# Patient Record
Sex: MALE | Race: WHITE | NOT HISPANIC OR LATINO
[De-identification: names, ages, dates, MRNs, and addresses within clinical notes are randomized per-mention and may not be internally consistent; named-entity substitution may affect disease eponyms.]

---
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
## 2020-01-15 ENCOUNTER — APPOINTMENT (OUTPATIENT)
Dept: PEDIATRIC GASTROENTEROLOGY | Facility: HOSPITAL | Age: 20
End: 2020-01-15

## 2020-01-21 RX ORDER — EPINEPHRINE 0.3 MG/.3ML
0.5 INJECTION INTRAMUSCULAR; SUBCUTANEOUS ONCE
Refills: 0 | Status: DISCONTINUED | OUTPATIENT
Start: 2020-01-22 | End: 2020-02-06

## 2020-01-21 RX ORDER — DIPHENHYDRAMINE HCL 50 MG
50 CAPSULE ORAL ONCE
Refills: 0 | Status: DISCONTINUED | OUTPATIENT
Start: 2020-01-22 | End: 2020-02-06

## 2020-01-21 RX ORDER — SODIUM CHLORIDE 9 MG/ML
1000 INJECTION INTRAMUSCULAR; INTRAVENOUS; SUBCUTANEOUS ONCE
Refills: 0 | Status: DISCONTINUED | OUTPATIENT
Start: 2020-01-22 | End: 2020-02-06

## 2020-01-22 ENCOUNTER — OUTPATIENT (OUTPATIENT)
Dept: OUTPATIENT SERVICES | Age: 20
LOS: 1 days | End: 2020-01-22

## 2020-01-22 VITALS
HEART RATE: 82 BPM | OXYGEN SATURATION: 100 % | TEMPERATURE: 99 F | RESPIRATION RATE: 16 BRPM | DIASTOLIC BLOOD PRESSURE: 81 MMHG | SYSTOLIC BLOOD PRESSURE: 153 MMHG

## 2020-01-22 VITALS
RESPIRATION RATE: 16 BRPM | TEMPERATURE: 98 F | SYSTOLIC BLOOD PRESSURE: 148 MMHG | DIASTOLIC BLOOD PRESSURE: 78 MMHG | HEART RATE: 112 BPM | OXYGEN SATURATION: 99 %

## 2020-01-22 DIAGNOSIS — K51.50 LEFT SIDED COLITIS WITHOUT COMPLICATIONS: ICD-10-CM

## 2020-01-22 DIAGNOSIS — K62.5 HEMORRHAGE OF ANUS AND RECTUM: ICD-10-CM

## 2020-01-22 LAB
ALBUMIN SERPL ELPH-MCNC: 4.6 G/DL
ALP BLD-CCNC: 83 U/L
ALT SERPL-CCNC: 22 U/L
ANION GAP SERPL CALC-SCNC: 13 MMOL/L
AST SERPL-CCNC: 14 U/L
BASOPHILS # BLD AUTO: 0.08 K/UL
BASOPHILS NFR BLD AUTO: 1 %
BILIRUB SERPL-MCNC: 0.4 MG/DL
BUN SERPL-MCNC: 17 MG/DL
CALCIUM SERPL-MCNC: 10 MG/DL
CHLORIDE SERPL-SCNC: 105 MMOL/L
CO2 SERPL-SCNC: 27 MMOL/L
CREAT SERPL-MCNC: 0.78 MG/DL
CRP SERPL-MCNC: <0.1 MG/DL
EOSINOPHIL # BLD AUTO: 0.58 K/UL
EOSINOPHIL NFR BLD AUTO: 7 %
GLUCOSE SERPL-MCNC: 102 MG/DL
HCT VFR BLD CALC: 45.7 %
HGB BLD-MCNC: 15.3 G/DL
IMM GRANULOCYTES NFR BLD AUTO: 0.1 %
LYMPHOCYTES # BLD AUTO: 2.76 K/UL
LYMPHOCYTES NFR BLD AUTO: 33.3 %
MAN DIFF?: NORMAL
MCHC RBC-ENTMCNC: 30.1 PG
MCHC RBC-ENTMCNC: 33.5 GM/DL
MCV RBC AUTO: 90 FL
MONOCYTES # BLD AUTO: 0.7 K/UL
MONOCYTES NFR BLD AUTO: 8.5 %
NEUTROPHILS # BLD AUTO: 4.15 K/UL
NEUTROPHILS NFR BLD AUTO: 50.1 %
PLATELET # BLD AUTO: 234 K/UL
POTASSIUM SERPL-SCNC: 4.7 MMOL/L
PROT SERPL-MCNC: 7 G/DL
RBC # BLD: 5.08 M/UL
RBC # FLD: 11.8 %
SODIUM SERPL-SCNC: 145 MMOL/L
WBC # FLD AUTO: 8.28 K/UL

## 2020-01-22 RX ORDER — VEDOLIZUMAB 108 MG/.68ML
300 INJECTION, SOLUTION SUBCUTANEOUS ONCE
Refills: 0 | Status: COMPLETED | OUTPATIENT
Start: 2020-01-22 | End: 2020-01-22

## 2020-01-22 RX ADMIN — VEDOLIZUMAB 500 MILLIGRAM(S): 108 INJECTION, SOLUTION SUBCUTANEOUS at 08:48

## 2020-03-17 RX ORDER — EPINEPHRINE 0.3 MG/.3ML
0.5 INJECTION INTRAMUSCULAR; SUBCUTANEOUS ONCE
Refills: 0 | Status: DISCONTINUED | OUTPATIENT
Start: 2020-03-20 | End: 2020-04-04

## 2020-03-17 RX ORDER — DIPHENHYDRAMINE HCL 50 MG
50 CAPSULE ORAL ONCE
Refills: 0 | Status: DISCONTINUED | OUTPATIENT
Start: 2020-03-20 | End: 2020-04-04

## 2020-03-17 RX ORDER — SODIUM CHLORIDE 9 MG/ML
1000 INJECTION INTRAMUSCULAR; INTRAVENOUS; SUBCUTANEOUS ONCE
Refills: 0 | Status: DISCONTINUED | OUTPATIENT
Start: 2020-03-20 | End: 2020-04-04

## 2020-03-20 ENCOUNTER — OUTPATIENT (OUTPATIENT)
Dept: OUTPATIENT SERVICES | Age: 20
LOS: 1 days | End: 2020-03-20

## 2020-03-20 VITALS
HEART RATE: 107 BPM | SYSTOLIC BLOOD PRESSURE: 142 MMHG | OXYGEN SATURATION: 99 % | DIASTOLIC BLOOD PRESSURE: 82 MMHG | TEMPERATURE: 98 F | RESPIRATION RATE: 18 BRPM

## 2020-03-20 VITALS — HEART RATE: 113 BPM | RESPIRATION RATE: 18 BRPM | TEMPERATURE: 99 F | OXYGEN SATURATION: 99 %

## 2020-03-20 DIAGNOSIS — K62.5 HEMORRHAGE OF ANUS AND RECTUM: ICD-10-CM

## 2020-03-20 DIAGNOSIS — K51.50 LEFT SIDED COLITIS WITHOUT COMPLICATIONS: ICD-10-CM

## 2020-03-20 LAB
ALBUMIN SERPL ELPH-MCNC: 4.9 G/DL
ALP BLD-CCNC: 89 U/L
ALT SERPL-CCNC: 30 U/L
ANION GAP SERPL CALC-SCNC: 13 MMOL/L
AST SERPL-CCNC: 19 U/L
BASOPHILS # BLD AUTO: 0.08 K/UL
BASOPHILS NFR BLD AUTO: 0.8 %
BILIRUB SERPL-MCNC: 0.6 MG/DL
BUN SERPL-MCNC: 14 MG/DL
CALCIUM SERPL-MCNC: 10.5 MG/DL
CHLORIDE SERPL-SCNC: 103 MMOL/L
CO2 SERPL-SCNC: 27 MMOL/L
CREAT SERPL-MCNC: 0.77 MG/DL
CRP SERPL-MCNC: <0.1 MG/DL
EOSINOPHIL # BLD AUTO: 0.54 K/UL
EOSINOPHIL NFR BLD AUTO: 5.6 %
GLUCOSE SERPL-MCNC: 88 MG/DL
HCT VFR BLD CALC: 45.9 %
HGB BLD-MCNC: 15.7 G/DL
IMM GRANULOCYTES NFR BLD AUTO: 0.3 %
LYMPHOCYTES # BLD AUTO: 3.38 K/UL
LYMPHOCYTES NFR BLD AUTO: 35 %
MAN DIFF?: NORMAL
MCHC RBC-ENTMCNC: 30.5 PG
MCHC RBC-ENTMCNC: 34.2 GM/DL
MCV RBC AUTO: 89.3 FL
MONOCYTES # BLD AUTO: 0.84 K/UL
MONOCYTES NFR BLD AUTO: 8.7 %
NEUTROPHILS # BLD AUTO: 4.79 K/UL
NEUTROPHILS NFR BLD AUTO: 49.6 %
PLATELET # BLD AUTO: 277 K/UL
POTASSIUM SERPL-SCNC: 4.3 MMOL/L
PROT SERPL-MCNC: 7.2 G/DL
RBC # BLD: 5.14 M/UL
RBC # FLD: 12.4 %
SODIUM SERPL-SCNC: 144 MMOL/L
WBC # FLD AUTO: 9.66 K/UL

## 2020-03-20 RX ORDER — VEDOLIZUMAB 108 MG/.68ML
300 INJECTION, SOLUTION SUBCUTANEOUS ONCE
Refills: 0 | Status: COMPLETED | OUTPATIENT
Start: 2020-03-20 | End: 2020-03-20

## 2020-03-20 RX ORDER — INFLUENZA VIRUS VACCINE 15; 15; 15; 15 UG/.5ML; UG/.5ML; UG/.5ML; UG/.5ML
0.5 SUSPENSION INTRAMUSCULAR ONCE
Refills: 0 | Status: COMPLETED | OUTPATIENT
Start: 2020-03-20 | End: 2020-03-20

## 2020-03-20 RX ADMIN — VEDOLIZUMAB 500 MILLIGRAM(S): 108 INJECTION, SOLUTION SUBCUTANEOUS at 08:50

## 2020-03-20 RX ADMIN — INFLUENZA VIRUS VACCINE 0.5 MILLILITER(S): 15; 15; 15; 15 SUSPENSION INTRAMUSCULAR at 10:05

## 2020-05-12 RX ORDER — SODIUM CHLORIDE 9 MG/ML
1000 INJECTION INTRAMUSCULAR; INTRAVENOUS; SUBCUTANEOUS ONCE
Refills: 0 | Status: DISCONTINUED | OUTPATIENT
Start: 2020-05-15 | End: 2020-05-30

## 2020-05-12 RX ORDER — VEDOLIZUMAB 108 MG/.68ML
300 INJECTION, SOLUTION SUBCUTANEOUS ONCE
Refills: 0 | Status: COMPLETED | OUTPATIENT
Start: 2020-05-15 | End: 2020-05-15

## 2020-05-12 RX ORDER — EPINEPHRINE 0.3 MG/.3ML
0.5 INJECTION INTRAMUSCULAR; SUBCUTANEOUS ONCE
Refills: 0 | Status: DISCONTINUED | OUTPATIENT
Start: 2020-05-15 | End: 2020-05-30

## 2020-05-12 RX ORDER — DIPHENHYDRAMINE HCL 50 MG
50 CAPSULE ORAL ONCE
Refills: 0 | Status: DISCONTINUED | OUTPATIENT
Start: 2020-05-15 | End: 2020-05-30

## 2020-05-15 ENCOUNTER — OUTPATIENT (OUTPATIENT)
Dept: OUTPATIENT SERVICES | Age: 20
LOS: 1 days | End: 2020-05-15

## 2020-05-15 VITALS
HEART RATE: 149 BPM | OXYGEN SATURATION: 99 % | RESPIRATION RATE: 20 BRPM | TEMPERATURE: 98 F | DIASTOLIC BLOOD PRESSURE: 96 MMHG | SYSTOLIC BLOOD PRESSURE: 167 MMHG

## 2020-05-15 VITALS
HEART RATE: 119 BPM | DIASTOLIC BLOOD PRESSURE: 66 MMHG | SYSTOLIC BLOOD PRESSURE: 131 MMHG | TEMPERATURE: 98 F | RESPIRATION RATE: 18 BRPM | OXYGEN SATURATION: 98 %

## 2020-05-15 DIAGNOSIS — K51.50 LEFT SIDED COLITIS WITHOUT COMPLICATIONS: ICD-10-CM

## 2020-05-15 RX ADMIN — VEDOLIZUMAB 500 MILLIGRAM(S): 108 INJECTION, SOLUTION SUBCUTANEOUS at 15:15

## 2020-05-17 LAB
ALBUMIN SERPL ELPH-MCNC: 4.9 G/DL
ALP BLD-CCNC: 81 U/L
ALT SERPL-CCNC: 28 U/L
ANION GAP SERPL CALC-SCNC: 19 MMOL/L
AST SERPL-CCNC: 19 U/L
BASOPHILS # BLD AUTO: 0.11 K/UL
BASOPHILS NFR BLD AUTO: 1 %
BILIRUB SERPL-MCNC: 0.5 MG/DL
BUN SERPL-MCNC: 15 MG/DL
CALCIUM SERPL-MCNC: 9.8 MG/DL
CHLORIDE SERPL-SCNC: 99 MMOL/L
CO2 SERPL-SCNC: 24 MMOL/L
CREAT SERPL-MCNC: 0.68 MG/DL
CRP SERPL-MCNC: 0.15 MG/DL
EOSINOPHIL # BLD AUTO: 0.79 K/UL
EOSINOPHIL NFR BLD AUTO: 7.5 %
GLUCOSE SERPL-MCNC: 65 MG/DL
HCT VFR BLD CALC: 49.2 %
HGB BLD-MCNC: 16.7 G/DL
IMM GRANULOCYTES NFR BLD AUTO: 0.3 %
LYMPHOCYTES # BLD AUTO: 2.23 K/UL
LYMPHOCYTES NFR BLD AUTO: 21.3 %
MAN DIFF?: NORMAL
MCHC RBC-ENTMCNC: 30.6 PG
MCHC RBC-ENTMCNC: 33.9 GM/DL
MCV RBC AUTO: 90.3 FL
MONOCYTES # BLD AUTO: 0.69 K/UL
MONOCYTES NFR BLD AUTO: 6.6 %
NEUTROPHILS # BLD AUTO: 6.63 K/UL
NEUTROPHILS NFR BLD AUTO: 63.3 %
PLATELET # BLD AUTO: 282 K/UL
POTASSIUM SERPL-SCNC: 4.1 MMOL/L
PROT SERPL-MCNC: 7.8 G/DL
RBC # BLD: 5.45 M/UL
RBC # FLD: 12.4 %
SARS-COV-2 IGG SERPL IA-ACNC: <0.1 INDEX
SARS-COV-2 IGG SERPL QL IA: NEGATIVE
SODIUM SERPL-SCNC: 143 MMOL/L
WBC # FLD AUTO: 10.48 K/UL

## 2020-05-28 ENCOUNTER — APPOINTMENT (OUTPATIENT)
Dept: PEDIATRIC GASTROENTEROLOGY | Facility: CLINIC | Age: 20
End: 2020-05-28
Payer: COMMERCIAL

## 2020-05-28 DIAGNOSIS — L65.9 NONSCARRING HAIR LOSS, UNSPECIFIED: ICD-10-CM

## 2020-05-28 PROCEDURE — 99214 OFFICE O/P EST MOD 30 MIN: CPT | Mod: 95

## 2020-05-28 NOTE — HISTORY OF PRESENT ILLNESS
[Home] : at home, [unfilled] , at the time of the visit. [Other Location: e.g. Home (Enter Location, City,State)___] : at [unfilled] [Verbal consent obtained from patient] : the patient, [unfilled] [de-identified] : Overview: Juan presented in April 2017 with bloody diarrhea. He was positive for C difficile infection on initial stool tests, treated with vancomycin without complete resolution of symptoms. He then had an endoscopy and colonoscopy with moderate left sided ulcerative colitis diagnosed. He was started on prednisone 40 mg prednisone daily and Lialda 4.8 grams daily. He had clinical remission and successfully tapered his prednisone. However mild symptoms eventually returned, and he had repeat colonoscopy July 2018 with active confluent colitis to 55 cm from anal verge and normal right colon / ileum. He was started on Vedolizumab 300 mg September 2018, which led to clinical remission by week 24. \par \par Interval history: Juan has overall been doing well.  He is on Vedolizumab 300 mg every 8 weeks, and for the first 7+ weeks he has no symptoms, with formed stools and no blood.  In the 1-2 days prior to infusion, he begins to have some vague mild abdominal discomfort, increased bowel movement frequency by additional 1-2 more bowel movements per day from baseline, and softer stool consistency, but not diarrhea, and no rectal bleeding.  After the infusion, these symptoms resolve.  Juan notes he has always been anxious about medical encounters and thinks these symptoms are more irritable in nature related to the anxiety of the upcoming infusion.  Juan's other complaint today is hair thinning.  His triplet brothers have not experienced this, and he wonders if it could be medication related\par \par Previous evaluations\par 4/2017\par Fecal calprotectin 321\par C difficile PCR: positive\par Quantiferon Gold: negative\par Hepatitis BsAg: negative, Hep BsAb: nonreactive

## 2020-05-28 NOTE — CONSULT LETTER
[Dear  ___] : Dear  [unfilled], [Courtesy Letter:] : I had the pleasure of seeing your patient, [unfilled], in my office today. [Please see my note below.] : Please see my note below. [Consult Closing:] : Thank you very much for allowing me to participate in the care of this patient.  If you have any questions, please do not hesitate to contact me. [Sincerely,] : Sincerely, [FreeTextEntry3] : Nomi Martin MD MS\par [facility]\par

## 2020-05-28 NOTE — ASSESSMENT
[Educated Patient & Family about Diagnosis] : educated the patient and family about the diagnosis [FreeTextEntry1] : Juan is a 19 year old male with ulcerative colitis in clinical remission on vedolizumab maintenance every 8 weeks.  His abdominal discomfort and bowel movement change in the 1-2 days before infusion could be breakthrough IBD symptoms or IBS related to anxiety of upcoming infusion.  Juan agrees to collect a calprotectin during the 1-2 days before an infusion.  If elevated will shorten interval to 7 weeks.  If normal, will discuss IBS like treatments.  Regarding his hair thinning concern, this has not been reported in any significant proportion of people receiving vedolizumab.  Recommended dermatology evaluation for any other possible scalp pathologies that could be contributing vs regular male pattern hair changes.

## 2020-05-28 NOTE — PHYSICAL EXAM
[Well Nourished] : well nourished [Alert and Active] : alert and active [Interactive] : interactive [Respiratory Distress] : no respiratory distress  [Jaundice] : no jaundice

## 2020-07-01 RX ORDER — DIPHENHYDRAMINE HCL 50 MG
50 CAPSULE ORAL ONCE
Refills: 0 | Status: DISCONTINUED | OUTPATIENT
Start: 2020-07-10 | End: 2020-07-25

## 2020-07-01 RX ORDER — EPINEPHRINE 0.3 MG/.3ML
0.5 INJECTION INTRAMUSCULAR; SUBCUTANEOUS ONCE
Refills: 0 | Status: DISCONTINUED | OUTPATIENT
Start: 2020-07-10 | End: 2020-07-25

## 2020-07-01 RX ORDER — SODIUM CHLORIDE 9 MG/ML
1000 INJECTION INTRAMUSCULAR; INTRAVENOUS; SUBCUTANEOUS ONCE
Refills: 0 | Status: DISCONTINUED | OUTPATIENT
Start: 2020-07-10 | End: 2020-07-25

## 2020-07-10 ENCOUNTER — OUTPATIENT (OUTPATIENT)
Dept: OUTPATIENT SERVICES | Age: 20
LOS: 1 days | End: 2020-07-10

## 2020-07-10 VITALS
HEART RATE: 125 BPM | DIASTOLIC BLOOD PRESSURE: 79 MMHG | SYSTOLIC BLOOD PRESSURE: 157 MMHG | OXYGEN SATURATION: 100 % | RESPIRATION RATE: 18 BRPM | TEMPERATURE: 98 F

## 2020-07-10 VITALS
DIASTOLIC BLOOD PRESSURE: 89 MMHG | RESPIRATION RATE: 18 BRPM | TEMPERATURE: 99 F | OXYGEN SATURATION: 98 % | SYSTOLIC BLOOD PRESSURE: 150 MMHG | HEART RATE: 111 BPM

## 2020-07-10 DIAGNOSIS — K51.50 LEFT SIDED COLITIS WITHOUT COMPLICATIONS: ICD-10-CM

## 2020-07-10 DIAGNOSIS — K62.5 HEMORRHAGE OF ANUS AND RECTUM: ICD-10-CM

## 2020-07-10 LAB
ALBUMIN SERPL ELPH-MCNC: 4.8 G/DL
ALP BLD-CCNC: 91 U/L
ALT SERPL-CCNC: 35 U/L
ANION GAP SERPL CALC-SCNC: 17 MMOL/L
AST SERPL-CCNC: 20 U/L
BASOPHILS # BLD AUTO: 0.12 K/UL
BASOPHILS NFR BLD AUTO: 1 %
BILIRUB SERPL-MCNC: 0.6 MG/DL
BUN SERPL-MCNC: 12 MG/DL
CALCIUM SERPL-MCNC: 10.2 MG/DL
CHLORIDE SERPL-SCNC: 101 MMOL/L
CO2 SERPL-SCNC: 24 MMOL/L
CREAT SERPL-MCNC: 0.85 MG/DL
CRP SERPL-MCNC: 0.14 MG/DL
EOSINOPHIL # BLD AUTO: 1.19 K/UL
EOSINOPHIL NFR BLD AUTO: 10.2 %
GLUCOSE SERPL-MCNC: 99 MG/DL
HCT VFR BLD CALC: 48.6 %
HGB BLD-MCNC: 16.8 G/DL
IMM GRANULOCYTES NFR BLD AUTO: 0.3 %
LYMPHOCYTES # BLD AUTO: 3.07 K/UL
LYMPHOCYTES NFR BLD AUTO: 26.3 %
MAN DIFF?: NORMAL
MCHC RBC-ENTMCNC: 30.9 PG
MCHC RBC-ENTMCNC: 34.6 GM/DL
MCV RBC AUTO: 89.3 FL
MONOCYTES # BLD AUTO: 0.9 K/UL
MONOCYTES NFR BLD AUTO: 7.7 %
NEUTROPHILS # BLD AUTO: 6.36 K/UL
NEUTROPHILS NFR BLD AUTO: 54.5 %
PLATELET # BLD AUTO: 296 K/UL
POTASSIUM SERPL-SCNC: 4.1 MMOL/L
PROT SERPL-MCNC: 7.1 G/DL
RBC # BLD: 5.44 M/UL
RBC # FLD: 12.3 %
SODIUM SERPL-SCNC: 143 MMOL/L
WBC # FLD AUTO: 11.67 K/UL

## 2020-07-10 RX ORDER — VEDOLIZUMAB 108 MG/.68ML
300 INJECTION, SOLUTION SUBCUTANEOUS ONCE
Refills: 0 | Status: COMPLETED | OUTPATIENT
Start: 2020-07-10 | End: 2020-07-10

## 2020-07-10 RX ADMIN — VEDOLIZUMAB 500 MILLIGRAM(S): 108 INJECTION, SOLUTION SUBCUTANEOUS at 08:57

## 2020-07-16 LAB — CALPROTECTIN FECAL: 900 UG/G

## 2020-07-20 NOTE — ED PROVIDER NOTE - CPE EDP RESP NORM
Please contact patient for In-patient follow up.  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 Diabetic Infec  Number of visits in past 12 months:0/1     normal...

## 2020-08-10 ENCOUNTER — APPOINTMENT (OUTPATIENT)
Dept: PEDIATRIC GASTROENTEROLOGY | Facility: CLINIC | Age: 20
End: 2020-08-10
Payer: COMMERCIAL

## 2020-08-10 VITALS
BODY MASS INDEX: 26.76 KG/M2 | HEIGHT: 70.87 IN | DIASTOLIC BLOOD PRESSURE: 94 MMHG | WEIGHT: 191.14 LBS | TEMPERATURE: 99.1 F | HEART RATE: 113 BPM | SYSTOLIC BLOOD PRESSURE: 203 MMHG

## 2020-08-10 PROCEDURE — 99214 OFFICE O/P EST MOD 30 MIN: CPT

## 2020-08-12 NOTE — CONSULT LETTER
[Dear  ___] : Dear  [unfilled], [Courtesy Letter:] : I had the pleasure of seeing your patient, [unfilled], in my office today. [Please see my note below.] : Please see my note below. [Consult Closing:] : Thank you very much for allowing me to participate in the care of this patient.  If you have any questions, please do not hesitate to contact me. [Sincerely,] : Sincerely, [FreeTextEntry3] : Nomi Martin MD MS\par [facility]\par

## 2020-08-12 NOTE — HISTORY OF PRESENT ILLNESS
[de-identified] : Overview: Juan presented in April 2017 with bloody diarrhea. He was positive for C difficile infection on initial stool tests, treated with vancomycin without complete resolution of symptoms. He then had an endoscopy and colonoscopy with moderate left sided ulcerative colitis diagnosed. He was started on prednisone 40 mg prednisone daily and Lialda 4.8 grams daily. He had clinical remission and successfully tapered his prednisone. However mild symptoms eventually returned, and he had repeat colonoscopy July 2018 with active confluent colitis to 55 cm from anal verge and normal right colon / ileum. He was started on Vedolizumab 300 mg September 2018, which led to clinical remission by week 24. \par \par Interval history: Juan has overall been doing well since last visit 2-3 months ago.  He is on Vedolizumab 300 mg every 8 weeks.  He did a fecal calprotectin recently, which was 900.  He does not have active gastrointestinal symptoms at this time.  He says when he is anxious or stressed he may notice more abdominal symptoms, otherwise feels well.  \par \par Previous evaluations\par 4/2017\par Fecal calprotectin 321\par C difficile PCR: positive\par Quantiferon Gold: negative\par Hepatitis BsAg: negative, Hep BsAb: nonreactive.

## 2020-08-12 NOTE — ASSESSMENT
[Educated Patient & Family about Diagnosis] : educated the patient and family about the diagnosis [FreeTextEntry1] : Juan is a 19 year old male with UC on Entyvio in clinical remission, however a recently obtain fecal calprotectin was very high 900, suggesting his mucosa remains active.  We discussed importance of mucosal healing in the treat to target paradigm.  Will repeat fecal calprotectin, and if remains unchanged from previous recent test, will discuss treatment modifications, possibly combination therapy.

## 2020-08-28 RX ORDER — SODIUM CHLORIDE 9 MG/ML
1000 INJECTION INTRAMUSCULAR; INTRAVENOUS; SUBCUTANEOUS ONCE
Refills: 0 | Status: DISCONTINUED | OUTPATIENT
Start: 2020-09-04 | End: 2020-09-18

## 2020-08-28 RX ORDER — EPINEPHRINE 0.3 MG/.3ML
0.5 INJECTION INTRAMUSCULAR; SUBCUTANEOUS ONCE
Refills: 0 | Status: DISCONTINUED | OUTPATIENT
Start: 2020-09-04 | End: 2020-09-18

## 2020-08-28 RX ORDER — DIPHENHYDRAMINE HCL 50 MG
50 CAPSULE ORAL ONCE
Refills: 0 | Status: DISCONTINUED | OUTPATIENT
Start: 2020-09-04 | End: 2020-09-18

## 2020-09-04 ENCOUNTER — OUTPATIENT (OUTPATIENT)
Dept: OUTPATIENT SERVICES | Age: 20
LOS: 1 days | End: 2020-09-04

## 2020-09-04 VITALS
RESPIRATION RATE: 18 BRPM | DIASTOLIC BLOOD PRESSURE: 76 MMHG | TEMPERATURE: 98 F | OXYGEN SATURATION: 96 % | HEART RATE: 115 BPM | SYSTOLIC BLOOD PRESSURE: 145 MMHG

## 2020-09-04 VITALS
SYSTOLIC BLOOD PRESSURE: 160 MMHG | RESPIRATION RATE: 18 BRPM | TEMPERATURE: 98 F | HEART RATE: 119 BPM | DIASTOLIC BLOOD PRESSURE: 89 MMHG | OXYGEN SATURATION: 100 % | WEIGHT: 192.68 LBS

## 2020-09-04 DIAGNOSIS — K62.5 HEMORRHAGE OF ANUS AND RECTUM: ICD-10-CM

## 2020-09-04 LAB
ALBUMIN SERPL ELPH-MCNC: 5.1 G/DL
ALP BLD-CCNC: 108 U/L
ALT SERPL-CCNC: 32 U/L
ANION GAP SERPL CALC-SCNC: 15 MMOL/L
AST SERPL-CCNC: 19 U/L
BASOPHILS # BLD AUTO: 0.12 K/UL
BASOPHILS NFR BLD AUTO: 0.9 %
BILIRUB SERPL-MCNC: 0.6 MG/DL
BUN SERPL-MCNC: 12 MG/DL
CALCIUM SERPL-MCNC: 10.1 MG/DL
CHLORIDE SERPL-SCNC: 102 MMOL/L
CO2 SERPL-SCNC: 26 MMOL/L
CREAT SERPL-MCNC: 0.85 MG/DL
CRP SERPL-MCNC: <0.1 MG/DL
EOSINOPHIL # BLD AUTO: 1.23 K/UL
EOSINOPHIL NFR BLD AUTO: 9.6 %
GLUCOSE SERPL-MCNC: 80 MG/DL
HCT VFR BLD CALC: 49.9 %
HGB BLD-MCNC: 16.8 G/DL
IMM GRANULOCYTES NFR BLD AUTO: 0.2 %
LYMPHOCYTES # BLD AUTO: 2.65 K/UL
LYMPHOCYTES NFR BLD AUTO: 20.8 %
MAN DIFF?: NORMAL
MCHC RBC-ENTMCNC: 30.3 PG
MCHC RBC-ENTMCNC: 33.7 GM/DL
MCV RBC AUTO: 90.1 FL
MONOCYTES # BLD AUTO: 0.98 K/UL
MONOCYTES NFR BLD AUTO: 7.7 %
NEUTROPHILS # BLD AUTO: 7.74 K/UL
NEUTROPHILS NFR BLD AUTO: 60.8 %
PLATELET # BLD AUTO: 331 K/UL
POTASSIUM SERPL-SCNC: 4 MMOL/L
PROT SERPL-MCNC: 7.6 G/DL
RBC # BLD: 5.54 M/UL
RBC # FLD: 11.9 %
SODIUM SERPL-SCNC: 143 MMOL/L
WBC # FLD AUTO: 12.75 K/UL

## 2020-09-04 RX ORDER — VEDOLIZUMAB 108 MG/.68ML
300 INJECTION, SOLUTION SUBCUTANEOUS ONCE
Refills: 0 | Status: COMPLETED | OUTPATIENT
Start: 2020-09-04 | End: 2020-09-04

## 2020-09-04 RX ADMIN — VEDOLIZUMAB 500 MILLIGRAM(S): 108 INJECTION, SOLUTION SUBCUTANEOUS at 09:04

## 2020-10-30 ENCOUNTER — OUTPATIENT (OUTPATIENT)
Dept: OUTPATIENT SERVICES | Age: 20
LOS: 1 days | End: 2020-10-30

## 2020-10-30 VITALS
OXYGEN SATURATION: 98 % | DIASTOLIC BLOOD PRESSURE: 85 MMHG | HEART RATE: 134 BPM | RESPIRATION RATE: 18 BRPM | SYSTOLIC BLOOD PRESSURE: 121 MMHG

## 2020-10-30 VITALS
TEMPERATURE: 98 F | SYSTOLIC BLOOD PRESSURE: 109 MMHG | RESPIRATION RATE: 18 BRPM | OXYGEN SATURATION: 98 % | DIASTOLIC BLOOD PRESSURE: 71 MMHG | HEART RATE: 124 BPM

## 2020-10-30 DIAGNOSIS — K51.50 LEFT SIDED COLITIS WITHOUT COMPLICATIONS: ICD-10-CM

## 2020-10-30 RX ORDER — VEDOLIZUMAB 108 MG/.68ML
300 INJECTION, SOLUTION SUBCUTANEOUS ONCE
Refills: 0 | Status: COMPLETED | OUTPATIENT
Start: 2020-10-30 | End: 2020-10-30

## 2020-10-30 RX ORDER — INFLUENZA VIRUS VACCINE 15; 15; 15; 15 UG/.5ML; UG/.5ML; UG/.5ML; UG/.5ML
0.5 SUSPENSION INTRAMUSCULAR ONCE
Refills: 0 | Status: COMPLETED | OUTPATIENT
Start: 2020-10-30 | End: 2020-10-30

## 2020-10-30 RX ADMIN — VEDOLIZUMAB 500 MILLIGRAM(S): 108 INJECTION, SOLUTION SUBCUTANEOUS at 08:50

## 2020-10-30 RX ADMIN — INFLUENZA VIRUS VACCINE 0.5 MILLILITER(S): 15; 15; 15; 15 SUSPENSION INTRAMUSCULAR at 09:25

## 2020-11-02 LAB
ALBUMIN SERPL ELPH-MCNC: 4.9 G/DL
ALP BLD-CCNC: 121 U/L
ALT SERPL-CCNC: 47 U/L
ANION GAP SERPL CALC-SCNC: 19 MMOL/L
AST SERPL-CCNC: 27 U/L
BASOPHILS # BLD AUTO: 0.09 K/UL
BASOPHILS NFR BLD AUTO: 1 %
BILIRUB SERPL-MCNC: 1 MG/DL
BUN SERPL-MCNC: 9 MG/DL
CALCIUM SERPL-MCNC: 10.1 MG/DL
CALPROTECTIN FECAL: 1736 UG/G
CHLORIDE SERPL-SCNC: 101 MMOL/L
CO2 SERPL-SCNC: 24 MMOL/L
CREAT SERPL-MCNC: 0.79 MG/DL
CRP SERPL-MCNC: 1.23 MG/DL
EOSINOPHIL # BLD AUTO: 0.22 K/UL
EOSINOPHIL NFR BLD AUTO: 2.5 %
GLUCOSE SERPL-MCNC: 138 MG/DL
HCT VFR BLD CALC: 49.7 %
HGB BLD-MCNC: 16.8 G/DL
IMM GRANULOCYTES NFR BLD AUTO: 0.1 %
LYMPHOCYTES # BLD AUTO: 0.95 K/UL
LYMPHOCYTES NFR BLD AUTO: 10.6 %
MAN DIFF?: NORMAL
MCHC RBC-ENTMCNC: 30.9 PG
MCHC RBC-ENTMCNC: 33.8 GM/DL
MCV RBC AUTO: 91.4 FL
MONOCYTES # BLD AUTO: 0.66 K/UL
MONOCYTES NFR BLD AUTO: 7.4 %
NEUTROPHILS # BLD AUTO: 7.04 K/UL
NEUTROPHILS NFR BLD AUTO: 78.4 %
PLATELET # BLD AUTO: 291 K/UL
POTASSIUM SERPL-SCNC: 3.9 MMOL/L
PROT SERPL-MCNC: 7.8 G/DL
RBC # BLD: 5.44 M/UL
RBC # FLD: 12.3 %
SODIUM SERPL-SCNC: 144 MMOL/L
WBC # FLD AUTO: 8.97 K/UL

## 2020-11-03 DIAGNOSIS — Z23 ENCOUNTER FOR IMMUNIZATION: ICD-10-CM

## 2020-11-05 ENCOUNTER — TRANSCRIPTION ENCOUNTER (OUTPATIENT)
Age: 20
End: 2020-11-05

## 2020-11-05 ENCOUNTER — APPOINTMENT (OUTPATIENT)
Dept: PEDIATRIC GASTROENTEROLOGY | Facility: CLINIC | Age: 20
End: 2020-11-05
Payer: COMMERCIAL

## 2020-11-05 VITALS — HEART RATE: 142 BPM | DIASTOLIC BLOOD PRESSURE: 77 MMHG | SYSTOLIC BLOOD PRESSURE: 161 MMHG

## 2020-11-05 VITALS
SYSTOLIC BLOOD PRESSURE: 164 MMHG | TEMPERATURE: 98.5 F | DIASTOLIC BLOOD PRESSURE: 87 MMHG | HEART RATE: 150 BPM | HEIGHT: 71.18 IN | WEIGHT: 195.77 LBS | BODY MASS INDEX: 27.11 KG/M2

## 2020-11-05 DIAGNOSIS — Z87.19 PERSONAL HISTORY OF OTHER DISEASES OF THE DIGESTIVE SYSTEM: ICD-10-CM

## 2020-11-05 PROCEDURE — 99214 OFFICE O/P EST MOD 30 MIN: CPT

## 2020-11-05 PROCEDURE — 99072 ADDL SUPL MATRL&STAF TM PHE: CPT

## 2020-11-06 ENCOUNTER — NON-APPOINTMENT (OUTPATIENT)
Age: 20
End: 2020-11-06

## 2020-11-06 PROBLEM — Z87.19 HISTORY OF RECTAL BLEEDING: Status: RESOLVED | Noted: 2018-02-15 | Resolved: 2020-11-06

## 2020-11-06 NOTE — HISTORY OF PRESENT ILLNESS
[de-identified] : Overview: Juan presented in April 2017 with bloody diarrhea. He was positive for C difficile infection on initial stool tests, treated with vancomycin without complete resolution of symptoms. He then had an endoscopy and colonoscopy with moderate left sided ulcerative colitis diagnosed. He was started on prednisone 40 mg prednisone daily and Lialda 4.8 grams daily. He had clinical remission and successfully tapered his prednisone. However mild symptoms eventually returned, and he had repeat colonoscopy July 2018 with active confluent colitis to 55 cm from anal verge and normal right colon / ileum. He was started on Vedolizumab 300 mg September 2018, which led to clinical remission by week 24. \par \par Interval history: Juan has been having new colitis symptoms in the past 1-2 weeks.  He had a fecal calprotectin in July of 900 without symptoms.  A follow up calprotectin collected last week was 1736.  He now is having no abdominal pain, semi formed stools with small amount of blood in most stools and no nocturnal bowel movements.  PUCAI 30.  He continues on Vedolizumab 300 mg every 8 weeks. \par \par Previous evaluations\par 4/2017\par Fecal calprotectin 321\par C difficile PCR: positive\par Quantiferon Gold: negative\par Hepatitis BsAg: negative, Hep BsAb: nonreactive. \par

## 2020-11-06 NOTE — ASSESSMENT
[Educated Patient & Family about Diagnosis] : educated the patient and family about the diagnosis [FreeTextEntry1] : Juan is a 20 year old male with UC who achieved clinical remission on Entyvio however now is having breakthrough symptoms.  Given history of C diff infection at time of diagnosis, will repeat a C diff PCR to ensure no concurrent infection.  If C diff is negative, adjustment in his therapy is indicated.  Discussed numerous options including changing the Entyvio to every 4 weeks, or change in therapy to anti-TNF, ustekinumab, or tofacitinib.  We also discussed a clinical trial option using transcutaneous vagal nerve stimulation, which is an option Juan is interested in.  He will review the protocol consent form and then determine if he would like to participate.  If he chooses a change in medical therapy, a flex sig would be performed before choosing which therapy is most appropriate.

## 2020-11-06 NOTE — CONSULT LETTER
[Dear  ___] : Dear  [unfilled], [Courtesy Letter:] : I had the pleasure of seeing your patient, [unfilled], in my office today. [Please see my note below.] : Please see my note below. [Consult Closing:] : Thank you very much for allowing me to participate in the care of this patient.  If you have any questions, please do not hesitate to contact me. [Sincerely,] : Sincerely, [FreeTextEntry3] : Nomi Martin MD MS\par [facility]\par

## 2020-11-08 LAB
C DIFF TOX GENS STL QL NAA+PROBE: NORMAL
CDIFF BY PCR: NOT DETECTED

## 2020-12-08 ENCOUNTER — NON-APPOINTMENT (OUTPATIENT)
Age: 20
End: 2020-12-08

## 2020-12-28 ENCOUNTER — OUTPATIENT (OUTPATIENT)
Dept: OUTPATIENT SERVICES | Age: 20
LOS: 1 days | End: 2020-12-28

## 2020-12-28 VITALS
HEART RATE: 117 BPM | SYSTOLIC BLOOD PRESSURE: 129 MMHG | OXYGEN SATURATION: 98 % | DIASTOLIC BLOOD PRESSURE: 89 MMHG | TEMPERATURE: 99 F | RESPIRATION RATE: 18 BRPM

## 2020-12-28 VITALS
RESPIRATION RATE: 20 BRPM | DIASTOLIC BLOOD PRESSURE: 78 MMHG | SYSTOLIC BLOOD PRESSURE: 189 MMHG | OXYGEN SATURATION: 98 % | HEART RATE: 144 BPM | TEMPERATURE: 99 F

## 2020-12-28 DIAGNOSIS — K51.50 LEFT SIDED COLITIS WITHOUT COMPLICATIONS: ICD-10-CM

## 2020-12-28 RX ORDER — VEDOLIZUMAB 108 MG/.68ML
300 INJECTION, SOLUTION SUBCUTANEOUS ONCE
Refills: 0 | Status: COMPLETED | OUTPATIENT
Start: 2020-12-28 | End: 2020-12-28

## 2020-12-28 RX ADMIN — VEDOLIZUMAB 500 MILLIGRAM(S): 108 INJECTION, SOLUTION SUBCUTANEOUS at 08:49

## 2020-12-29 LAB
ALBUMIN SERPL ELPH-MCNC: 4.7 G/DL
ALP BLD-CCNC: 121 U/L
ALT SERPL-CCNC: 26 U/L
ANION GAP SERPL CALC-SCNC: 13 MMOL/L
AST SERPL-CCNC: 18 U/L
BASOPHILS # BLD AUTO: 0.14 K/UL
BASOPHILS NFR BLD AUTO: 1.2 %
BILIRUB SERPL-MCNC: 0.4 MG/DL
BUN SERPL-MCNC: 12 MG/DL
CALCIUM SERPL-MCNC: 9.9 MG/DL
CHLORIDE SERPL-SCNC: 106 MMOL/L
CO2 SERPL-SCNC: 24 MMOL/L
CREAT SERPL-MCNC: 0.89 MG/DL
CRP SERPL-MCNC: 0.36 MG/DL
EOSINOPHIL # BLD AUTO: 1.15 K/UL
EOSINOPHIL NFR BLD AUTO: 10.1 %
GLUCOSE SERPL-MCNC: 92 MG/DL
HCT VFR BLD CALC: 48.2 %
HGB BLD-MCNC: 16.4 G/DL
IMM GRANULOCYTES NFR BLD AUTO: 0.4 %
LYMPHOCYTES # BLD AUTO: 3.04 K/UL
LYMPHOCYTES NFR BLD AUTO: 26.6 %
MAN DIFF?: NORMAL
MCHC RBC-ENTMCNC: 30.5 PG
MCHC RBC-ENTMCNC: 34 GM/DL
MCV RBC AUTO: 89.8 FL
MONOCYTES # BLD AUTO: 1.48 K/UL
MONOCYTES NFR BLD AUTO: 12.9 %
NEUTROPHILS # BLD AUTO: 5.57 K/UL
NEUTROPHILS NFR BLD AUTO: 48.8 %
PLATELET # BLD AUTO: 335 K/UL
POTASSIUM SERPL-SCNC: 4 MMOL/L
PROT SERPL-MCNC: 7.6 G/DL
RBC # BLD: 5.37 M/UL
RBC # FLD: 12.1 %
SODIUM SERPL-SCNC: 143 MMOL/L
WBC # FLD AUTO: 11.43 K/UL

## 2021-01-02 ENCOUNTER — NON-APPOINTMENT (OUTPATIENT)
Age: 21
End: 2021-01-02

## 2021-01-08 LAB
C DIFF TOX GENS STL QL NAA+PROBE: NORMAL
CDIFF BY PCR: NOT DETECTED

## 2021-01-25 ENCOUNTER — RX RENEWAL (OUTPATIENT)
Age: 21
End: 2021-01-25

## 2021-01-27 ENCOUNTER — OUTPATIENT (OUTPATIENT)
Dept: OUTPATIENT SERVICES | Age: 21
LOS: 1 days | End: 2021-01-27

## 2021-01-27 VITALS
HEART RATE: 120 BPM | DIASTOLIC BLOOD PRESSURE: 83 MMHG | SYSTOLIC BLOOD PRESSURE: 144 MMHG | OXYGEN SATURATION: 100 % | RESPIRATION RATE: 20 BRPM | TEMPERATURE: 99 F

## 2021-01-27 VITALS
HEART RATE: 165 BPM | OXYGEN SATURATION: 100 % | TEMPERATURE: 98 F | RESPIRATION RATE: 20 BRPM | DIASTOLIC BLOOD PRESSURE: 89 MMHG | SYSTOLIC BLOOD PRESSURE: 147 MMHG

## 2021-01-27 LAB
ALBUMIN SERPL ELPH-MCNC: 4.5 G/DL
ALP BLD-CCNC: 109 U/L
ALT SERPL-CCNC: 23 U/L
ANION GAP SERPL CALC-SCNC: 14 MMOL/L
AST SERPL-CCNC: 17 U/L
BASOPHILS # BLD AUTO: 0.09 K/UL
BASOPHILS NFR BLD AUTO: 0.9 %
BILIRUB SERPL-MCNC: 0.6 MG/DL
BUN SERPL-MCNC: 11 MG/DL
CALCIUM SERPL-MCNC: 10.5 MG/DL
CHLORIDE SERPL-SCNC: 105 MMOL/L
CO2 SERPL-SCNC: 23 MMOL/L
CREAT SERPL-MCNC: 0.92 MG/DL
CRP SERPL-MCNC: 0.71 MG/DL
EOSINOPHIL # BLD AUTO: 0.46 K/UL
EOSINOPHIL NFR BLD AUTO: 4.7 %
GLUCOSE SERPL-MCNC: 129 MG/DL
HCT VFR BLD CALC: 45.2 %
HGB BLD-MCNC: 14.7 G/DL
IMM GRANULOCYTES NFR BLD AUTO: 0.3 %
LYMPHOCYTES # BLD AUTO: 2.14 K/UL
LYMPHOCYTES NFR BLD AUTO: 21.8 %
MAN DIFF?: NORMAL
MCHC RBC-ENTMCNC: 29.8 PG
MCHC RBC-ENTMCNC: 32.5 GM/DL
MCV RBC AUTO: 91.5 FL
MONOCYTES # BLD AUTO: 1.13 K/UL
MONOCYTES NFR BLD AUTO: 11.5 %
NEUTROPHILS # BLD AUTO: 5.95 K/UL
NEUTROPHILS NFR BLD AUTO: 60.8 %
PLATELET # BLD AUTO: 370 K/UL
POTASSIUM SERPL-SCNC: 4.1 MMOL/L
PROT SERPL-MCNC: 7.5 G/DL
RBC # BLD: 4.94 M/UL
RBC # FLD: 12.2 %
SODIUM SERPL-SCNC: 141 MMOL/L
WBC # FLD AUTO: 9.8 K/UL

## 2021-01-27 RX ORDER — VEDOLIZUMAB 108 MG/.68ML
300 INJECTION, SOLUTION SUBCUTANEOUS ONCE
Refills: 0 | Status: COMPLETED | OUTPATIENT
Start: 2021-01-27 | End: 2021-01-27

## 2021-01-27 RX ADMIN — VEDOLIZUMAB 500 MILLIGRAM(S): 108 INJECTION, SOLUTION SUBCUTANEOUS at 09:35

## 2021-01-28 DIAGNOSIS — K51.50 LEFT SIDED COLITIS WITHOUT COMPLICATIONS: ICD-10-CM

## 2021-02-05 ENCOUNTER — NON-APPOINTMENT (OUTPATIENT)
Age: 21
End: 2021-02-05

## 2021-02-22 RX ORDER — PREDNISONE 20 MG/1
20 TABLET ORAL DAILY
Qty: 60 | Refills: 0 | Status: DISCONTINUED | COMMUNITY
Start: 2021-01-02 | End: 2021-02-22

## 2021-02-26 ENCOUNTER — OUTPATIENT (OUTPATIENT)
Dept: OUTPATIENT SERVICES | Age: 21
LOS: 1 days | End: 2021-02-26

## 2021-02-26 VITALS
TEMPERATURE: 98 F | WEIGHT: 190.7 LBS | OXYGEN SATURATION: 100 % | HEART RATE: 120 BPM | SYSTOLIC BLOOD PRESSURE: 167 MMHG | RESPIRATION RATE: 20 BRPM | DIASTOLIC BLOOD PRESSURE: 80 MMHG

## 2021-02-26 VITALS
RESPIRATION RATE: 18 BRPM | TEMPERATURE: 99 F | SYSTOLIC BLOOD PRESSURE: 152 MMHG | HEART RATE: 122 BPM | OXYGEN SATURATION: 99 % | DIASTOLIC BLOOD PRESSURE: 93 MMHG

## 2021-02-26 DIAGNOSIS — K51.50 LEFT SIDED COLITIS WITHOUT COMPLICATIONS: ICD-10-CM

## 2021-02-26 LAB
ALBUMIN SERPL ELPH-MCNC: 4.5 G/DL
ALP BLD-CCNC: 97 U/L
ALT SERPL-CCNC: 17 U/L
ANION GAP SERPL CALC-SCNC: 16 MMOL/L
AST SERPL-CCNC: 14 U/L
BASOPHILS # BLD AUTO: 0.09 K/UL
BASOPHILS NFR BLD AUTO: 0.8 %
BILIRUB SERPL-MCNC: 0.4 MG/DL
BUN SERPL-MCNC: 13 MG/DL
CALCIUM SERPL-MCNC: 10.2 MG/DL
CHLORIDE SERPL-SCNC: 104 MMOL/L
CO2 SERPL-SCNC: 22 MMOL/L
CREAT SERPL-MCNC: 0.86 MG/DL
CRP SERPL-MCNC: 0.25 MG/DL
EOSINOPHIL # BLD AUTO: 0.43 K/UL
EOSINOPHIL NFR BLD AUTO: 3.9 %
GGT SERPL-CCNC: 14 U/L
GLUCOSE SERPL-MCNC: 98 MG/DL
HCT VFR BLD CALC: 41.6 %
HGB BLD-MCNC: 13.9 G/DL
IMM GRANULOCYTES NFR BLD AUTO: 0.3 %
LYMPHOCYTES # BLD AUTO: 3.69 K/UL
LYMPHOCYTES NFR BLD AUTO: 33.3 %
MAN DIFF?: NORMAL
MCHC RBC-ENTMCNC: 29.3 PG
MCHC RBC-ENTMCNC: 33.4 GM/DL
MCV RBC AUTO: 87.8 FL
MONOCYTES # BLD AUTO: 0.97 K/UL
MONOCYTES NFR BLD AUTO: 8.8 %
NEUTROPHILS # BLD AUTO: 5.87 K/UL
NEUTROPHILS NFR BLD AUTO: 52.9 %
PLATELET # BLD AUTO: 430 K/UL
POTASSIUM SERPL-SCNC: 4.3 MMOL/L
PROT SERPL-MCNC: 7.7 G/DL
RBC # BLD: 4.74 M/UL
RBC # FLD: 12.2 %
SODIUM SERPL-SCNC: 141 MMOL/L
WBC # FLD AUTO: 11.08 K/UL

## 2021-02-26 RX ORDER — VEDOLIZUMAB 108 MG/.68ML
300 INJECTION, SOLUTION SUBCUTANEOUS ONCE
Refills: 0 | Status: COMPLETED | OUTPATIENT
Start: 2021-02-26 | End: 2021-02-26

## 2021-02-26 RX ADMIN — VEDOLIZUMAB 500 MILLIGRAM(S): 108 INJECTION, SOLUTION SUBCUTANEOUS at 12:20

## 2021-03-09 ENCOUNTER — APPOINTMENT (OUTPATIENT)
Dept: PEDIATRIC GASTROENTEROLOGY | Facility: CLINIC | Age: 21
End: 2021-03-09
Payer: COMMERCIAL

## 2021-03-09 VITALS
DIASTOLIC BLOOD PRESSURE: 93 MMHG | TEMPERATURE: 98.7 F | WEIGHT: 188.72 LBS | SYSTOLIC BLOOD PRESSURE: 145 MMHG | HEART RATE: 144 BPM | HEIGHT: 71.18 IN | BODY MASS INDEX: 26.13 KG/M2

## 2021-03-09 DIAGNOSIS — K62.5 HEMORRHAGE OF ANUS AND RECTUM: ICD-10-CM

## 2021-03-09 PROCEDURE — 99214 OFFICE O/P EST MOD 30 MIN: CPT

## 2021-03-09 PROCEDURE — 99072 ADDL SUPL MATRL&STAF TM PHE: CPT

## 2021-03-09 NOTE — HISTORY OF PRESENT ILLNESS
[de-identified] : Overview: Juan presented in April 2017 with bloody diarrhea. He was positive for C difficile infection on initial stool tests, treated with vancomycin without complete resolution of symptoms. He then had an endoscopy and colonoscopy with moderate left sided ulcerative colitis diagnosed. He was started on prednisone 40 mg prednisone daily and Lialda 4.8 grams daily. He had clinical remission and successfully tapered his prednisone. However mild symptoms eventually returned, and he had repeat colonoscopy July 2018 with active confluent colitis to 55 cm from anal verge and normal right colon / ileum. He was started on Vedolizumab 300 mg September 2018, which led to clinical remission by week 24.  He did well for about 2 years.  In summer 2020 developed colitis symptoms just leading up to Entyvio infusion, calprotectin was 900.  Repeat in October 2020 1736.  He entered a clinical trial using transcutaneous vagal nerve stimulation but this did not lead to remission.  He had a flare of symptoms and stopped the trial.  He started UCERIS in January 2021 and then Tofacitinib (Xeljanz) Feb 10 2021.  At dose of 10 mg BID, he had a very good clinical response in first 2 weeks of induction dosing, then started to have much worse colitis symptoms again.   \par \par Interval history: After first 2 weeks of tofacitinib, UCERIS was stopped, and within a day or 2, colitis symptoms worsened.  UCERIS was restarted but symptoms persisted.  He reports having 10-20 bowel movements per day, small amounts of stool out, mushy to loose stools, some blood.  Lots of tenesmus.  One nocturnal stool per night.  No abdominal pain.  PCUAI 55-60.  He has been on tofacitinib 4 weeks.  \par \par Previous evaluations\par 4/2017\par Fecal calprotectin 321\par C difficile PCR: positive\par Quantiferon Gold: negative\par Hepatitis BsAg: negative, Hep BsAb: nonreactive.\par \par

## 2021-03-09 NOTE — CONSULT LETTER
[Dear  ___] : Dear  [unfilled], [Courtesy Letter:] : I had the pleasure of seeing your patient, [unfilled], in my office today. [Please see my note below.] : Please see my note below. [Consult Closing:] : Thank you very much for allowing me to participate in the care of this patient.  If you have any questions, please do not hesitate to contact me. [Sincerely,] : Sincerely, [FreeTextEntry3] : Nomi Martin MD MS\par [facility]\par

## 2021-03-09 NOTE — ASSESSMENT
[Educated Patient & Family about Diagnosis] : educated the patient and family about the diagnosis [FreeTextEntry1] : Juan is a 20 year old male with UC, severely active disease despite tofacitinib induction for the past 4 weeks.  Given his significant clinical improvement in the first 2 weeks, then a sharp decline, intercurrent infection needs to be evaluated for.  Considerations include C diff, bacteria or parasite, CMV.  If this is just unresponsive disease, treatment options include Infliximab, Ustekinumab, or to proceed with colectomy given family history. \par \par Recommended plan\par - Stool for C diff, GI PCR\par - Flex sig to rule out CMV\par - Appointment with Dr. Liang to discuss colectomy

## 2021-03-11 ENCOUNTER — TRANSCRIPTION ENCOUNTER (OUTPATIENT)
Age: 21
End: 2021-03-11

## 2021-03-12 ENCOUNTER — RESULT REVIEW (OUTPATIENT)
Age: 21
End: 2021-03-12

## 2021-03-12 ENCOUNTER — TRANSCRIPTION ENCOUNTER (OUTPATIENT)
Age: 21
End: 2021-03-12

## 2021-03-12 ENCOUNTER — NON-APPOINTMENT (OUTPATIENT)
Age: 21
End: 2021-03-12

## 2021-03-12 ENCOUNTER — OUTPATIENT (OUTPATIENT)
Dept: OUTPATIENT SERVICES | Age: 21
LOS: 1 days | Discharge: HOME | End: 2021-03-12
Payer: COMMERCIAL

## 2021-03-12 VITALS
OXYGEN SATURATION: 95 % | RESPIRATION RATE: 18 BRPM | HEART RATE: 88 BPM | SYSTOLIC BLOOD PRESSURE: 139 MMHG | DIASTOLIC BLOOD PRESSURE: 76 MMHG

## 2021-03-12 VITALS
HEIGHT: 70.87 IN | TEMPERATURE: 100 F | OXYGEN SATURATION: 99 % | HEART RATE: 96 BPM | SYSTOLIC BLOOD PRESSURE: 163 MMHG | DIASTOLIC BLOOD PRESSURE: 82 MMHG | WEIGHT: 185.19 LBS | RESPIRATION RATE: 18 BRPM

## 2021-03-12 DIAGNOSIS — K51.90 ULCERATIVE COLITIS, UNSPECIFIED, WITHOUT COMPLICATIONS: ICD-10-CM

## 2021-03-12 LAB — GI PCR PANEL, STOOL: NORMAL

## 2021-03-12 PROCEDURE — 88342 IMHCHEM/IMCYTCHM 1ST ANTB: CPT | Mod: 26

## 2021-03-12 PROCEDURE — 88305 TISSUE EXAM BY PATHOLOGIST: CPT | Mod: 26

## 2021-03-12 PROCEDURE — 88341 IMHCHEM/IMCYTCHM EA ADD ANTB: CPT | Mod: 26

## 2021-03-12 NOTE — ASU DISCHARGE PLAN (ADULT/PEDIATRIC) - CARE PROVIDER_API CALL
Nomi Martin)  Pediatrics  [address], [address]  [place], [place] [id number]  Phone: [phone]  Fax: [phone]  Follow Up Time:

## 2021-03-12 NOTE — ASU PREOP CHECKLIST - WARM FLUIDS/WARM BLANKETS
Addendum to previous note on Colonoscopy- found not 2 but 3 polyps, and biopsied and were Adenomatous.
Colonoscopy 11/26/2019 thru [facility]- 2 polyps found and removed, along with Diverticulosis. Repeat in 3 years
no

## 2021-03-13 LAB
C DIFF TOX GENS STL QL NAA+PROBE: NORMAL
CDIFF BY PCR: DETECTED

## 2021-03-16 LAB — SURGICAL PATHOLOGY STUDY: SIGNIFICANT CHANGE UP

## 2021-03-23 PROBLEM — Q21.0 VENTRICULAR SEPTAL DEFECT: Chronic | Status: ACTIVE | Noted: 2021-03-12

## 2021-03-23 RX ORDER — BUDESONIDE 9 MG/1
9 TABLET, EXTENDED RELEASE ORAL
Qty: 30 | Refills: 3 | Status: DISCONTINUED | COMMUNITY
Start: 2021-01-07 | End: 2021-03-23

## 2021-03-23 RX ORDER — PREDNISONE 20 MG/1
20 TABLET ORAL DAILY
Qty: 60 | Refills: 0 | Status: DISCONTINUED | COMMUNITY
Start: 2021-03-12 | End: 2021-03-23

## 2021-03-24 ENCOUNTER — APPOINTMENT (OUTPATIENT)
Dept: PEDIATRIC SURGERY | Facility: CLINIC | Age: 21
End: 2021-03-24
Payer: COMMERCIAL

## 2021-03-24 VITALS — BODY MASS INDEX: 26.46 KG/M2 | HEIGHT: 72.05 IN | WEIGHT: 195.33 LBS | TEMPERATURE: 98.7 F

## 2021-03-24 PROCEDURE — 99072 ADDL SUPL MATRL&STAF TM PHE: CPT

## 2021-03-24 PROCEDURE — 99204 OFFICE O/P NEW MOD 45 MIN: CPT

## 2021-04-01 ENCOUNTER — OUTPATIENT (OUTPATIENT)
Dept: OUTPATIENT SERVICES | Age: 21
LOS: 1 days | End: 2021-04-01

## 2021-04-01 VITALS
RESPIRATION RATE: 18 BRPM | HEART RATE: 138 BPM | WEIGHT: 196.21 LBS | OXYGEN SATURATION: 100 % | DIASTOLIC BLOOD PRESSURE: 98 MMHG | SYSTOLIC BLOOD PRESSURE: 165 MMHG | TEMPERATURE: 99 F

## 2021-04-01 VITALS
HEART RATE: 117 BPM | RESPIRATION RATE: 18 BRPM | DIASTOLIC BLOOD PRESSURE: 87 MMHG | TEMPERATURE: 99 F | SYSTOLIC BLOOD PRESSURE: 133 MMHG | OXYGEN SATURATION: 99 %

## 2021-04-01 RX ORDER — INFLIXIMAB-DYYB 120 MG/ML
800 INJECTION SUBCUTANEOUS ONCE
Refills: 0 | Status: COMPLETED | OUTPATIENT
Start: 2021-04-01 | End: 2021-04-01

## 2021-04-01 RX ADMIN — INFLIXIMAB-DYYB 125 MILLIGRAM(S): 120 INJECTION SUBCUTANEOUS at 09:27

## 2021-04-03 DIAGNOSIS — K51.50 LEFT SIDED COLITIS WITHOUT COMPLICATIONS: ICD-10-CM

## 2021-04-06 ENCOUNTER — APPOINTMENT (OUTPATIENT)
Dept: DISASTER EMERGENCY | Facility: CLINIC | Age: 21
End: 2021-04-06

## 2021-04-07 ENCOUNTER — OUTPATIENT (OUTPATIENT)
Dept: OUTPATIENT SERVICES | Age: 21
LOS: 1 days | End: 2021-04-07

## 2021-04-07 VITALS
WEIGHT: 196.65 LBS | SYSTOLIC BLOOD PRESSURE: 164 MMHG | DIASTOLIC BLOOD PRESSURE: 81 MMHG | HEIGHT: 70.98 IN | RESPIRATION RATE: 18 BRPM | OXYGEN SATURATION: 100 % | HEART RATE: 107 BPM

## 2021-04-07 DIAGNOSIS — K51.90 ULCERATIVE COLITIS, UNSPECIFIED, WITHOUT COMPLICATIONS: ICD-10-CM

## 2021-04-07 DIAGNOSIS — Z98.890 OTHER SPECIFIED POSTPROCEDURAL STATES: Chronic | ICD-10-CM

## 2021-04-07 LAB
BLD GP AB SCN SERPL QL: NEGATIVE — SIGNIFICANT CHANGE UP
RH IG SCN BLD-IMP: POSITIVE — SIGNIFICANT CHANGE UP

## 2021-04-07 RX ORDER — MIDAZOLAM HYDROCHLORIDE 1 MG/ML
20 INJECTION, SOLUTION INTRAMUSCULAR; INTRAVENOUS ONCE
Refills: 0 | Status: DISCONTINUED | OUTPATIENT
Start: 2021-04-09 | End: 2021-04-11

## 2021-04-07 NOTE — H&P PST ADULT - NSICDXPROBLEM_GEN_ALL_CORE_FT
PROBLEM DIAGNOSES  Problem: Ulcerative colitis  Assessment and Plan: Pt scheduled for laparoscopic subtotal colectomy, possible colostomy on 4/9/21 with Dr. Robin at [facility].    Pt does not require stress dose steroid coverage due to the fact that he has only taken 1 dose of Hydrocortisone pr 2 weeks ago.

## 2021-04-07 NOTE — ADDENDUM
[FreeTextEntry1] : Documented by Breanne Davis acting as a scribe for Dr. Liang on 03/24/2021 .\par \par All medical record entries made by the Scribe were at my, Dr. Liang, direction and personally dictated by me on 03/24/2021 . I have reviewed the chart and agree that the record accurately reflects my personal performance of the history, physical exam, assessment and plan. I have also personally directed, reviewed, and agree with the discharge instructions.\par

## 2021-04-07 NOTE — H&P PST ADULT - ASSESSMENT
Pt appears well.  No evidence of acute illness or infection.  T&S sent as indicated, CBC and CMP completed on 4/1/21 prior to Remicade infusion with results WNL.  COVID testing completed with negative results  Child life prep during our visit.  Instructed to notify PCP and surgeon if s/s of infection develop prior to procedure.   CHG wipes provided with verbal and written instruction    Orders placed on hold in [place] for DOS for oral Midazolam due to anxiety and IV insert.

## 2021-04-07 NOTE — H&P PST ADULT - HISTORY OF PRESENT ILLNESS
21yo M w/significant hx of ulcerative colitis and recent clostridium difficile infection three weeks ago which was treated with vancomycin.   Denies any recent illness within the last 2 weeks.  COVID19 testing completed on 4/6/21 with negative results.  19yo M w/significant hx of ulcerative colitis and recent clostridium difficile infection three weeks ago which was treated with vancomycin.  Most recent Remicaide infusion 1 week ago  Denies any recent illness within the last 2 weeks.  COVID19 testing completed on 4/6/21 with negative results.  19yo M w/significant hx of ulcerative colitis and recent clostridium difficile infection three weeks ago which was treated with vancomycin.  Most recent Remicade infusion 1 week ago  Denies any recent illness within the last 2 weeks.  COVID19 testing completed on 4/6/21 with negative results.

## 2021-04-07 NOTE — H&P PST ADULT - REASON FOR ADMISSION
Pt presents to [facility] for pre-surgical evaluation prior to laparoscopic subtotal colectomy, possible colostomy on 4/9/21 with Dr. Robin at [facility].

## 2021-04-07 NOTE — H&P PST ADULT - GASTROINTESTINAL COMMENTS
Hx of left sided ulcerative colitis dx in April 2017 currently maintained on Entyvio (last dose 1 week ago), Hydrocorticone [place], and Xelijanz Hx of left sided ulcerative colitis dx in April 2017 currently maintained on Entyvio (last dose 1 week ago), Hydrocorticone [place], and Xelijanz.  Pt also has significant hx of c.diff infections, most recently admitted on 4/1/21 Hx of left sided ulcerative colitis dx in April 2017 currently maintained on Entyvio (last dose 1 week ago), Hydrocorticone [place] which patient states he is non-compliant (states he only took once two weeks ago), and Xelijanz.  Pt also has significant hx of c.diff infections, most recently 3 weeks ago, Vancomycin course completed approx 1 week ago.

## 2021-04-07 NOTE — H&P PST ADULT - ATTENDING COMMENTS
ARIADNA ONEAL is a 20y boy with for refractory UC here for laparoscopic subtotal colectomy and ileostomy    I have examined and assessed the patient.  I have met with the mother of the patient, and I have reviewed the risks and benefits of the planned procedure.  I have discussed the possible complications that may occur, including bleeding, infection, injury to important structures in the area of the operation and the need for additional surgery in the future.  I specifically discussed ureteral injury, stoma complications, and rectal stump leak.  I have also reviewed any alternatives to surgery that may be available.  The patient has indicated his understanding and written consent to proceed has been obtained.

## 2021-04-07 NOTE — H&P PST ADULT - CARDIOVASCULAR COMMENTS
Hx of VSD which has resolved.  Most recent cardiology evaluation in June 2019, follow up indicated PRN.  Denies any associated s/s

## 2021-04-07 NOTE — HISTORY OF PRESENT ILLNESS
[FreeTextEntry1] : Juan is a 20 year old male with ulcerative colitis. He has been followed by Dr. Martin since 2017. He was started on vanco two weeks ago for C diff. Now having 7-8 bowel movements per day, with half of them being bloody. He has cramping and pain with bowel movements recently. Discussed with Dr. Martin role for a colectomy.

## 2021-04-07 NOTE — ASSESSMENT
[FreeTextEntry1] : ARIADNA is a 20 year boy with severe UC refractory to medication. He is one of triplets and his two twin brothers have both had TPC/IPAA in 3 stages for refractory colitis.  Given his his refractory symptoms it seems more likely that ARIADNA will require colectomy with ileostomy at this point.    I spent considerable time with ARIADNA and his mother discussing the risks, benefits and alternatives of a laparoscopic subtotal colectomy.  We discussed complication of bleeding, infection and rectal stump leak and we also reviewed the expected rosa-operative recovery.  I also briefly explained to mom and to ARIADNA  about the future surgeries and the complications of those in order to regain intestinal continuity.  Ariadna is agreeable to proceed with colectomy and we will work on scheduling.I answered all of Ariadna and his mother's questions.

## 2021-04-07 NOTE — REASON FOR VISIT
[Initial - Scheduled] : an initial, scheduled visit with concerns of [Patient] : patient [Mother] : mother [FreeTextEntry3] : UC

## 2021-04-07 NOTE — H&P PST ADULT - NSANTHOSAYNRD_GEN_A_CORE
No. JANI screening performed.  STOP BANG Legend: 0-2 = LOW Risk; 3-4 = INTERMEDIATE Risk; 5-8 = HIGH Risk

## 2021-04-07 NOTE — PHYSICAL EXAM
[Soft] : soft [Acute Distress] : no acute distress [NL] : normal respiratory efforts [Tender] : not tender [Distended] : not distended [Patent] : patent

## 2021-04-07 NOTE — CONSULT LETTER
[Dear  ___] : Dear  [unfilled], [Consult Letter:] : I had the pleasure of evaluating your patient, [unfilled]. [Please see my note below.] : Please see my note below. [Consult Closing:] : Thank you very much for allowing me to participate in the care of this patient.  If you have any questions, please do not hesitate to contact me. [Sincerely,] : Sincerely, [FreeTextEntry2] : Nicholas Holland MD\par [address] \[facility] [address], [place] [id number] [FreeTextEntry3] : Pedro Liang MD FAAP FACS\par Director, [facility]\par Division of Pediatric General, Thoracic and Endoscopic Surgery\par [facility]

## 2021-04-07 NOTE — H&P PST ADULT - NSICDXPASTMEDICALHX_GEN_ALL_CORE_FT
PAST MEDICAL HISTORY:  H/O Clostridium difficile infection     Ulcerative colitis     VSD (ventricular septal defect) Closed

## 2021-04-07 NOTE — H&P PST ADULT - GENITOURINARY COMMENTS
Hx of HTN, was evaluated by nephrology in 2019.  Renal US completed in Aug 2019 with no evidence of renal artery stenosis.  Instructed to continue healthy lifestyle modifications

## 2021-04-08 ENCOUNTER — TRANSCRIPTION ENCOUNTER (OUTPATIENT)
Age: 21
End: 2021-04-08

## 2021-04-09 ENCOUNTER — RESULT REVIEW (OUTPATIENT)
Age: 21
End: 2021-04-09

## 2021-04-09 ENCOUNTER — INPATIENT (INPATIENT)
Age: 21
LOS: 1 days | Discharge: HOME | End: 2021-04-11
Attending: HOSPITALIST | Admitting: HOSPITALIST
Payer: COMMERCIAL

## 2021-04-09 ENCOUNTER — TRANSCRIPTION ENCOUNTER (OUTPATIENT)
Age: 21
End: 2021-04-09

## 2021-04-09 VITALS
OXYGEN SATURATION: 100 % | DIASTOLIC BLOOD PRESSURE: 82 MMHG | HEIGHT: 70.98 IN | SYSTOLIC BLOOD PRESSURE: 160 MMHG | TEMPERATURE: 99 F | WEIGHT: 196.65 LBS | HEART RATE: 128 BPM | RESPIRATION RATE: 18 BRPM

## 2021-04-09 DIAGNOSIS — K51.90 ULCERATIVE COLITIS, UNSPECIFIED, WITHOUT COMPLICATIONS: ICD-10-CM

## 2021-04-09 DIAGNOSIS — Z98.890 OTHER SPECIFIED POSTPROCEDURAL STATES: Chronic | ICD-10-CM

## 2021-04-09 PROCEDURE — 44210 LAPARO TOTAL PROCTOCOLECTOMY: CPT

## 2021-04-09 PROCEDURE — 88309 TISSUE EXAM BY PATHOLOGIST: CPT | Mod: 26

## 2021-04-09 RX ORDER — HEPARIN SODIUM 5000 [USP'U]/ML
5000 INJECTION INTRAVENOUS; SUBCUTANEOUS ONCE
Refills: 0 | Status: COMPLETED | OUTPATIENT
Start: 2021-04-09 | End: 2021-04-09

## 2021-04-09 RX ORDER — FENTANYL CITRATE 50 UG/ML
50 INJECTION INTRAVENOUS
Refills: 0 | Status: DISCONTINUED | OUTPATIENT
Start: 2021-04-09 | End: 2021-04-10

## 2021-04-09 RX ORDER — PIPERACILLIN AND TAZOBACTAM 4; .5 G/20ML; G/20ML
3000 INJECTION, POWDER, LYOPHILIZED, FOR SOLUTION INTRAVENOUS EVERY 6 HOURS
Refills: 0 | Status: COMPLETED | OUTPATIENT
Start: 2021-04-09 | End: 2021-04-10

## 2021-04-09 RX ORDER — OXYCODONE HYDROCHLORIDE 5 MG/1
5 TABLET ORAL EVERY 4 HOURS
Refills: 0 | Status: DISCONTINUED | OUTPATIENT
Start: 2021-04-09 | End: 2021-04-11

## 2021-04-09 RX ORDER — ONDANSETRON 8 MG/1
4 TABLET, FILM COATED ORAL ONCE
Refills: 0 | Status: DISCONTINUED | OUTPATIENT
Start: 2021-04-09 | End: 2021-04-10

## 2021-04-09 RX ORDER — DEXTROSE MONOHYDRATE, SODIUM CHLORIDE, AND POTASSIUM CHLORIDE 50; .745; 4.5 G/1000ML; G/1000ML; G/1000ML
1000 INJECTION, SOLUTION INTRAVENOUS
Refills: 0 | Status: DISCONTINUED | OUTPATIENT
Start: 2021-04-09 | End: 2021-04-10

## 2021-04-09 RX ORDER — HEPARIN SODIUM 5000 [USP'U]/ML
5000 INJECTION INTRAVENOUS; SUBCUTANEOUS EVERY 8 HOURS
Refills: 0 | Status: DISCONTINUED | OUTPATIENT
Start: 2021-04-09 | End: 2021-04-11

## 2021-04-09 RX ORDER — KETOROLAC TROMETHAMINE 30 MG/ML
30 SYRINGE (ML) INJECTION EVERY 6 HOURS
Refills: 0 | Status: DISCONTINUED | OUTPATIENT
Start: 2021-04-09 | End: 2021-04-11

## 2021-04-09 RX ORDER — ACETAMINOPHEN 500 MG
650 TABLET ORAL EVERY 6 HOURS
Refills: 0 | Status: DISCONTINUED | OUTPATIENT
Start: 2021-04-09 | End: 2021-04-11

## 2021-04-09 RX ORDER — HYDROMORPHONE HYDROCHLORIDE 2 MG/ML
0.5 INJECTION INTRAMUSCULAR; INTRAVENOUS; SUBCUTANEOUS
Refills: 0 | Status: DISCONTINUED | OUTPATIENT
Start: 2021-04-09 | End: 2021-04-10

## 2021-04-09 RX ADMIN — Medication 30 MILLIGRAM(S): at 21:15

## 2021-04-09 RX ADMIN — DEXTROSE MONOHYDRATE, SODIUM CHLORIDE, AND POTASSIUM CHLORIDE 75 MILLILITER(S): 50; .745; 4.5 INJECTION, SOLUTION INTRAVENOUS at 20:35

## 2021-04-09 RX ADMIN — HEPARIN SODIUM 5000 UNIT(S): 5000 INJECTION INTRAVENOUS; SUBCUTANEOUS at 21:53

## 2021-04-09 RX ADMIN — Medication 30 MILLIGRAM(S): at 21:00

## 2021-04-09 RX ADMIN — HEPARIN SODIUM 5000 UNIT(S): 5000 INJECTION INTRAVENOUS; SUBCUTANEOUS at 14:05

## 2021-04-09 NOTE — DISCHARGE NOTE NURSING/CASE MANAGEMENT/SOCIAL WORK - NSDCPNINST_GEN_ALL_CORE
Resume regular diet and activity as tolerated.No gym,sports,heavy lifting or strenuous activity.Avoid sick contacts,insist on good hand washing.Avoid crowds,maintain social distancing,masks as indicated.Administer medications as directed and follow-up with M.D. as scheduled.Report to M.MANOLO. fever,pain not relieved with pain medications,vomitting,change in ostomy output,redness,swelling,drainage or odor from incision sites,increased sleepiness or irritability or general issues.

## 2021-04-09 NOTE — ASU PATIENT PROFILE, PEDIATRIC - LOW RISK FALLS INTERVENTIONS (SCORE 7-11)
Orientation to room/Use of non-skid footwear for ambulating patients, use of appropriate size clothing to prevent risk of tripping/Patient and family education available to parents and patient/Document fall prevention teaching and include in plan of care

## 2021-04-09 NOTE — CHART NOTE - NSCHARTNOTEFT_GEN_A_CORE
POST-OPERATIVE NOTE    Subjective: Pt is resting well in bed this evening. Denies fever/chills, CP, SOB, nausea, and vomiting. Admits to mild incisional abd pain. Pt has started urinating. No other concerns this evening/AM.  Patient is s/p subtotal colectomy with end ileostomy. Recovering appropriately.     Vital Signs Last 24 Hrs  T(C): 37.2 (09 Apr 2021 23:31), Max: 37.5 (09 Apr 2021 22:30)  T(F): 98.9 (09 Apr 2021 23:31), Max: 99.5 (09 Apr 2021 22:30)  HR: 122 (09 Apr 2021 23:31) (102 - 128)  BP: 145/73 (09 Apr 2021 23:31) (118/81 - 160/82)  BP(mean): 93 (09 Apr 2021 23:00) (87 - 101)  RR: 18 (09 Apr 2021 23:31) (12 - 18)  SpO2: 98% (09 Apr 2021 23:31) (96% - 100%)  I&O's Detail    09 Apr 2021 07:01  -  09 Apr 2021 23:52  --------------------------------------------------------  IN:    dextrose 5% + sodium chloride 0.9% + potassium chloride 20 mEq/L - Pediatric: 150 mL    Oral Fluid: 40 mL  Total IN: 190 mL    OUT:  Total OUT: 0 mL    Total NET: 190 mL        piperacillin/tazobactam IV Intermittent - Peds 3000  heparin SubCutaneous Injection - Peds 5000  piperacillin/tazobactam IV Intermittent - Peds 3000    PAST MEDICAL & SURGICAL HISTORY:  Ulcerative colitis    VSD (ventricular septal defect)  Closed    H/O Clostridium difficile infection    S/P colonoscopy  &amp; endoscopy 2017          Physical Exam:  General: NAD, resting comfortably in bed  Pulmonary: Nonlabored breathing, no respiratory distress  Cardiovascular: NSR  Abdominal: soft, NT/ND  Extremities: WWP      LABS:            CAPILLARY BLOOD GLUCOSE          Radiology and Additional Studies:    Assessment:  The patient is a 20y Male who is now several hours post-op from a subtotal colectomy with end ileostomy    Plan:  - Pain control as needed  - OOB and ambulating as tolerated    Pediatric Surgery  [id number] POST-OPERATIVE NOTE    Subjective: Pt is resting well in bed this evening. Denies fever/chills, CP, SOB, nausea, and vomiting. Admits to mild incisional abd pain. Pt has not yet started urinating. No other concerns this evening/AM.  Patient is s/p subtotal colectomy with end ileostomy. Recovering appropriately.     Vital Signs Last 24 Hrs  T(C): 37.2 (09 Apr 2021 23:31), Max: 37.5 (09 Apr 2021 22:30)  T(F): 98.9 (09 Apr 2021 23:31), Max: 99.5 (09 Apr 2021 22:30)  HR: 122 (09 Apr 2021 23:31) (102 - 128)  BP: 145/73 (09 Apr 2021 23:31) (118/81 - 160/82)  BP(mean): 93 (09 Apr 2021 23:00) (87 - 101)  RR: 18 (09 Apr 2021 23:31) (12 - 18)  SpO2: 98% (09 Apr 2021 23:31) (96% - 100%)  I&O's Detail    09 Apr 2021 07:01  -  09 Apr 2021 23:52  --------------------------------------------------------  IN:    dextrose 5% + sodium chloride 0.9% + potassium chloride 20 mEq/L - Pediatric: 150 mL    Oral Fluid: 40 mL  Total IN: 190 mL    OUT:  Total OUT: 0 mL    Total NET: 190 mL        piperacillin/tazobactam IV Intermittent - Peds 3000  heparin SubCutaneous Injection - Peds 5000  piperacillin/tazobactam IV Intermittent - Peds 3000    PAST MEDICAL & SURGICAL HISTORY:  Ulcerative colitis    VSD (ventricular septal defect)  Closed    H/O Clostridium difficile infection    S/P colonoscopy  &amp; endoscopy 2017          Physical Exam:  General: NAD, resting comfortably in bed  Pulmonary: Nonlabored breathing, no respiratory distress  Cardiovascular: NSR  Abdominal: soft, not distended, mild tenderness around incisions, dressings are c/d/i, ostomy with some brown output  Extremities: WWP      LABS:            CAPILLARY BLOOD GLUCOSE          Radiology and Additional Studies:    Assessment:  The patient is a 20y Male who is now several hours post-op from a subtotal colectomy with end ileostomy    Plan:  - Pain control as needed  - OOB and ambulating as tolerated    Pediatric Surgery  [id number]

## 2021-04-09 NOTE — DISCHARGE NOTE NURSING/CASE MANAGEMENT/SOCIAL WORK - PATIENT PORTAL LINK FT
You can access the FollowMyHealth Patient Portal offered by [facility] by registering at the following website: [email or web address]. By joining [employer]’s FollowMyHealth portal, you will also be able to view your health information using other applications (apps) compatible with our system.

## 2021-04-09 NOTE — DISCHARGE NOTE NURSING/CASE MANAGEMENT/SOCIAL WORK - NSDCVIVACCINE_GEN_ALL_CORE_FT
Influenza , 2018/10/25 15:50 , Christy Lux (RN)  Influenza , 2020/3/20 10:05 , Lina Hernadez (RN)  Influenza , 2020/10/30 09:25 , Yaritza George (RN)

## 2021-04-09 NOTE — ASU PREOP CHECKLIST, PEDIATRIC - IV STARTED
Patient requested MTM transport to be called for discharge. Writer arranged MTM prior to DC.    yes No

## 2021-04-10 RX ORDER — SODIUM CHLORIDE 9 MG/ML
1000 INJECTION, SOLUTION INTRAVENOUS
Refills: 0 | Status: DISCONTINUED | OUTPATIENT
Start: 2021-04-10 | End: 2021-04-10

## 2021-04-10 RX ORDER — DEXTROSE MONOHYDRATE, SODIUM CHLORIDE, AND POTASSIUM CHLORIDE 50; .745; 4.5 G/1000ML; G/1000ML; G/1000ML
1000 INJECTION, SOLUTION INTRAVENOUS
Refills: 0 | Status: DISCONTINUED | OUTPATIENT
Start: 2021-04-10 | End: 2021-04-10

## 2021-04-10 RX ADMIN — Medication 650 MILLIGRAM(S): at 13:18

## 2021-04-10 RX ADMIN — Medication 30 MILLIGRAM(S): at 09:52

## 2021-04-10 RX ADMIN — HEPARIN SODIUM 5000 UNIT(S): 5000 INJECTION INTRAVENOUS; SUBCUTANEOUS at 06:36

## 2021-04-10 RX ADMIN — Medication 650 MILLIGRAM(S): at 00:30

## 2021-04-10 RX ADMIN — Medication 30 MILLIGRAM(S): at 03:47

## 2021-04-10 RX ADMIN — Medication 650 MILLIGRAM(S): at 18:26

## 2021-04-10 RX ADMIN — Medication 650 MILLIGRAM(S): at 14:18

## 2021-04-10 RX ADMIN — SODIUM CHLORIDE 75 MILLILITER(S): 9 INJECTION, SOLUTION INTRAVENOUS at 04:20

## 2021-04-10 RX ADMIN — Medication 30 MILLIGRAM(S): at 21:46

## 2021-04-10 RX ADMIN — PIPERACILLIN AND TAZOBACTAM 100 MILLIGRAM(S): 4; .5 INJECTION, POWDER, LYOPHILIZED, FOR SOLUTION INTRAVENOUS at 12:00

## 2021-04-10 RX ADMIN — Medication 30 MILLIGRAM(S): at 16:00

## 2021-04-10 RX ADMIN — HEPARIN SODIUM 5000 UNIT(S): 5000 INJECTION INTRAVENOUS; SUBCUTANEOUS at 16:10

## 2021-04-10 RX ADMIN — Medication 650 MILLIGRAM(S): at 07:34

## 2021-04-10 RX ADMIN — Medication 650 MILLIGRAM(S): at 06:51

## 2021-04-10 RX ADMIN — Medication 650 MILLIGRAM(S): at 00:00

## 2021-04-10 RX ADMIN — Medication 30 MILLIGRAM(S): at 15:52

## 2021-04-10 RX ADMIN — PIPERACILLIN AND TAZOBACTAM 100 MILLIGRAM(S): 4; .5 INJECTION, POWDER, LYOPHILIZED, FOR SOLUTION INTRAVENOUS at 06:51

## 2021-04-10 RX ADMIN — PIPERACILLIN AND TAZOBACTAM 100 MILLIGRAM(S): 4; .5 INJECTION, POWDER, LYOPHILIZED, FOR SOLUTION INTRAVENOUS at 00:46

## 2021-04-10 RX ADMIN — Medication 30 MILLIGRAM(S): at 22:00

## 2021-04-10 RX ADMIN — Medication 30 MILLIGRAM(S): at 09:32

## 2021-04-10 RX ADMIN — Medication 30 MILLIGRAM(S): at 03:17

## 2021-04-10 RX ADMIN — DEXTROSE MONOHYDRATE, SODIUM CHLORIDE, AND POTASSIUM CHLORIDE 75 MILLILITER(S): 50; .745; 4.5 INJECTION, SOLUTION INTRAVENOUS at 08:14

## 2021-04-10 RX ADMIN — DEXTROSE MONOHYDRATE, SODIUM CHLORIDE, AND POTASSIUM CHLORIDE 75 MILLILITER(S): 50; .745; 4.5 INJECTION, SOLUTION INTRAVENOUS at 04:20

## 2021-04-10 NOTE — PROGRESS NOTE PEDS - ASSESSMENT
20M w/ a hx of UC who is now s/p (4/9) subtotal colectomy with end ileostomy    PLAN:  -OOB as tolerated  -DVT ppx  -diet: CLD  -Will continue to monitor ostomy function    Pediatric Surgery  [id number] 20M w/ a hx of UC who is now s/p (4/9) subtotal colectomy with end ileostomy    PLAN:  -OOB as tolerated  -DVT ppx  -Reg diet  -Will continue to monitor ostomy function    Pediatric Surgery  [id number]

## 2021-04-10 NOTE — PROGRESS NOTE PEDS - SUBJECTIVE AND OBJECTIVE BOX
PEDIATRIC GENERAL SURGERY PROGRESS NOTE  ----------------------------------------------------------------------------------    ARIADNA ONEAL  |  [id number]   |   20y  |    Male   |   [facility] C3CN C[facility] A   |    Admit:  04-09-21 (1d)    Attending: Pedro Liang      Patient is a 20y old  Male who presents with a chief complaint of Pt presents to [facility] for pre-surgical evaluation prior to laparoscopic subtotal colectomy, possible colostomy on 4/9/21 with Dr. Robin at [facility]. (07 Apr 2021 15:29)      ----------------------------------------------------------------------------------    SUBJECTIVE:   Patient seen today during morning rounds at bedside and without acute distress. POC completed yesterday. No acute events overnight. No other concerns this AM.    OBJECTIVE:  MEDICATIONS  (STANDING):  acetaminophen   Oral Tab/Cap - Peds. 650 milliGRAM(s) Oral every 6 hours  dextrose 5% + sodium chloride 0.9%. - Pediatric 1000 milliLiter(s) (75 mL/Hr) IV Continuous <Continuous>  heparin SubCutaneous Injection - Peds 5000 Unit(s) SubCutaneous every 8 hours  ketorolac IV Push - Peds. 30 milliGRAM(s) IV Push every 6 hours  midazolam   Oral Liquid - Peds 20 milliGRAM(s) Oral once  piperacillin/tazobactam IV Intermittent - Peds 3000 milliGRAM(s) IV Intermittent every 6 hours    MEDICATIONS  (PRN):  fentaNYL    IV Push - Peds 50 MICROGram(s) IV Push every 10 minutes PRN Severe Pain (7 - 10)  HYDROmorphone IV Push - Peds 0.5 milliGRAM(s) IV Push every 15 minutes PRN Moderate Pain (4 - 6)  ondansetron IV Intermittent - Peds 4 milliGRAM(s) IV Intermittent once PRN Nausea and/or Vomiting  oxyCODONE   IR Oral Tab/Cap - Peds 5 milliGRAM(s) Oral every 4 hours PRN Moderate Pain (4 - 6)      Allergies    No Known Allergies    Intolerances        PMH:   Ulcerative colitis    VSD (ventricular septal defect)    H/O Clostridium difficile infection    S/P colonoscopy        Vitals:  Vital Signs Last 24 Hrs  T(C): 37.2 (09 Apr 2021 23:31), Max: 37.5 (09 Apr 2021 22:30)  T(F): 98.9 (09 Apr 2021 23:31), Max: 99.5 (09 Apr 2021 22:30)  HR: 122 (09 Apr 2021 23:31) (102 - 128)  BP: 145/73 (09 Apr 2021 23:31) (118/81 - 160/82)  BP(mean): 93 (09 Apr 2021 23:00) (87 - 101)  RR: 18 (09 Apr 2021 23:31) (12 - 18)  SpO2: 98% (09 Apr 2021 23:31) (96% - 100%)  Height (cm): 180.3 (04-09)  Weight (kg): 91.1 (04-09)  BMI (kg/m2): 28 (04-09)    Physical Exam:  General: NAD, resting comfortably in bed  Pulmonary: Nonlabored breathing, no respiratory distress  Cardiovascular: NSR  Abdominal: soft, not distended, mild tenderness around incisions, dressings are c/d/i, ostomy with some brown output  Extremities: WWP    I/O:        Labs:                Microbiology:      Imaging:  No new studies or laboratory tests performed recently.    Procedures:  Laparoscopic assisted subtotal colectomy     PEDIATRIC GENERAL SURGERY PROGRESS NOTE  ----------------------------------------------------------------------------------    ARIADNA ONEAL  |  [id number]   |   20y  |    Male   |   [facility] C3CN C335 A   |    Admit:  04-09-21 (1d)    Attending: Pedro Liang      Patient is a 20y old  Male who presents to [facility] for pre-surgical evaluation prior to laparoscopic subtotal colectomy, possible colostomy on 4/9/21 with Dr. Robin at [facility]. (07 Apr 2021 15:29)      ----------------------------------------------------------------------------------    SUBJECTIVE:   Patient seen today during morning rounds at bedside and without acute distress. POC completed yesterday. No acute events overnight. No other concerns this AM.    OBJECTIVE:  MEDICATIONS  (STANDING):  acetaminophen   Oral Tab/Cap - Peds. 650 milliGRAM(s) Oral every 6 hours  dextrose 5% + sodium chloride 0.9%. - Pediatric 1000 milliLiter(s) (75 mL/Hr) IV Continuous <Continuous>  heparin SubCutaneous Injection - Peds 5000 Unit(s) SubCutaneous every 8 hours  ketorolac IV Push - Peds. 30 milliGRAM(s) IV Push every 6 hours  midazolam   Oral Liquid - Peds 20 milliGRAM(s) Oral once  piperacillin/tazobactam IV Intermittent - Peds 3000 milliGRAM(s) IV Intermittent every 6 hours    MEDICATIONS  (PRN):  fentaNYL    IV Push - Peds 50 MICROGram(s) IV Push every 10 minutes PRN Severe Pain (7 - 10)  HYDROmorphone IV Push - Peds 0.5 milliGRAM(s) IV Push every 15 minutes PRN Moderate Pain (4 - 6)  ondansetron IV Intermittent - Peds 4 milliGRAM(s) IV Intermittent once PRN Nausea and/or Vomiting  oxyCODONE   IR Oral Tab/Cap - Peds 5 milliGRAM(s) Oral every 4 hours PRN Moderate Pain (4 - 6)      Allergies    No Known Allergies    Intolerances        PMH:   Ulcerative colitis    VSD (ventricular septal defect)    H/O Clostridium difficile infection    S/P colonoscopy        Vitals:  Vital Signs Last 24 Hrs  T(C): 37.2 (09 Apr 2021 23:31), Max: 37.5 (09 Apr 2021 22:30)  T(F): 98.9 (09 Apr 2021 23:31), Max: 99.5 (09 Apr 2021 22:30)  HR: 122 (09 Apr 2021 23:31) (102 - 128)  BP: 145/73 (09 Apr 2021 23:31) (118/81 - 160/82)  BP(mean): 93 (09 Apr 2021 23:00) (87 - 101)  RR: 18 (09 Apr 2021 23:31) (12 - 18)  SpO2: 98% (09 Apr 2021 23:31) (96% - 100%)  Height (cm): 180.3 (04-09)  Weight (kg): 91.1 (04-09)  BMI (kg/m2): 28 (04-09)    Physical Exam:  General: NAD, resting comfortably in bed  Pulmonary: Nonlabored breathing, no respiratory distress  Cardiovascular: NSR  Abdominal: soft, not distended, mild tenderness around incisions, dressings are c/d/i, ostomy with some brown output, ostomy pink and viable with air and stool in bag  Extremities: WWP    I/O:        Labs:                Microbiology:      Imaging:  No new studies or laboratory tests performed recently.    Procedures:  Laparoscopic assisted subtotal colectomy

## 2021-04-11 ENCOUNTER — TRANSCRIPTION ENCOUNTER (OUTPATIENT)
Age: 21
End: 2021-04-11

## 2021-04-11 VITALS
SYSTOLIC BLOOD PRESSURE: 125 MMHG | HEART RATE: 88 BPM | DIASTOLIC BLOOD PRESSURE: 65 MMHG | RESPIRATION RATE: 18 BRPM | OXYGEN SATURATION: 99 % | TEMPERATURE: 97 F

## 2021-04-11 RX ORDER — HYDROCORTISONE 20 MG
60 TABLET ORAL
Qty: 0 | Refills: 0 | DISCHARGE

## 2021-04-11 RX ORDER — VEDOLIZUMAB 108 MG/.68ML
0 INJECTION, SOLUTION SUBCUTANEOUS
Qty: 0 | Refills: 0 | DISCHARGE

## 2021-04-11 RX ORDER — TOFACITINIB 11 MG/1
1 TABLET, FILM COATED, EXTENDED RELEASE ORAL
Qty: 0 | Refills: 0 | DISCHARGE

## 2021-04-11 RX ADMIN — Medication 650 MILLIGRAM(S): at 13:23

## 2021-04-11 RX ADMIN — Medication 30 MILLIGRAM(S): at 03:00

## 2021-04-11 RX ADMIN — Medication 650 MILLIGRAM(S): at 00:30

## 2021-04-11 RX ADMIN — Medication 30 MILLIGRAM(S): at 03:30

## 2021-04-11 RX ADMIN — Medication 650 MILLIGRAM(S): at 08:15

## 2021-04-11 RX ADMIN — Medication 650 MILLIGRAM(S): at 01:00

## 2021-04-11 RX ADMIN — HEPARIN SODIUM 5000 UNIT(S): 5000 INJECTION INTRAVENOUS; SUBCUTANEOUS at 00:30

## 2021-04-11 RX ADMIN — HEPARIN SODIUM 5000 UNIT(S): 5000 INJECTION INTRAVENOUS; SUBCUTANEOUS at 08:37

## 2021-04-11 RX ADMIN — Medication 650 MILLIGRAM(S): at 07:31

## 2021-04-11 NOTE — DISCHARGE NOTE PROVIDER - CARE PROVIDERS DIRECT ADDRESSES
[email or web address]

## 2021-04-11 NOTE — DISCHARGE NOTE PROVIDER - CARE PROVIDER_API CALL
Pedro Liang)  Pediatric Surgery; Surgery  [address], [address]  [place] [address]  Phone: [phone]  Fax: [phone]  Follow Up Time:     Nomi Martin)  Pediatrics  [address], [address]  [place], [place] [id number]  Phone: [phone]  Fax: [phone]  Follow Up Time:

## 2021-04-11 NOTE — DISCHARGE NOTE PROVIDER - NSDCMRMEDTOKEN_GEN_ALL_CORE_FT
Entyvio 300 mg intravenous injection:   hydrocortisone 100 mg/60 mL rectal suspension: 60 milliliter(s) rectal 2 times a day  Xeljanz 10 mg oral tablet: 1 tab(s) orally 2 times a day

## 2021-04-11 NOTE — DISCHARGE NOTE PROVIDER - NSDCCPCAREPLAN_GEN_ALL_CORE_FT
PRINCIPAL DISCHARGE DIAGNOSIS  Diagnosis: Ulcerative colitis  Assessment and Plan of Treatment:

## 2021-04-11 NOTE — PROGRESS NOTE PEDS - SUBJECTIVE AND OBJECTIVE BOX
24h Events:   - Overnight, no acute events    Subjective:   Patient examined at bedside this AM. Reports feeling well.  Tolerating diet, pain well controlled.  OOB and ambulated around unit.  Air and stool in ostomy bag    Objective:  Vital Signs  T(C): 36.9 (04-11 @ 02:01), Max: 36.9 (04-11 @ 02:01)  HR: 95 (04-11 @ 02:01) (91 - 101)  BP: 133/76 (04-11 @ 02:01) (115/48 - 148/62)  RR: 20 (04-11 @ 02:01) (18 - 20)  SpO2: 98% (04-11 @ 02:01) (98% - 100%)  04-09-21 @ 07:01  -  04-10-21 @ 07:00  --------------------------------------------------------  IN:  Total IN: 0 mL    OUT:    Colostomy (mL): 25 mL    Voided (mL): 950 mL  Total OUT: 975 mL    Total NET: -975 mL      04-10-21 @ 07:01  -  04-11-21 @ 02:11  --------------------------------------------------------  IN:  Total IN: 0 mL    OUT:    Colostomy (mL): 650 mL    Voided (mL): 1250 mL  Total OUT: 1900 mL    Total NET: -1900 mL      Physical Exam:  General: NAD, resting comfortably in bed  Pulmonary: Nonlabored breathing, no respiratory distress  Cardiovascular: NSR  Abdominal: soft, not distended, mild tenderness around incisions, dressings are c/d/i, ostomy with some brown output, ostomy pink and viable with air and stool in bag  Extremities: WWP    Labs:        CAPILLARY BLOOD GLUCOSE          Medications:   MEDICATIONS  (STANDING):  acetaminophen   Oral Tab/Cap - Peds. 650 milliGRAM(s) Oral every 6 hours  heparin SubCutaneous Injection - Peds 5000 Unit(s) SubCutaneous every 8 hours  ketorolac IV Push - Peds. 30 milliGRAM(s) IV Push every 6 hours  midazolam   Oral Liquid - Peds 20 milliGRAM(s) Oral once    MEDICATIONS  (PRN):  oxyCODONE   IR Oral Tab/Cap - Peds 5 milliGRAM(s) Oral every 4 hours PRN Moderate Pain (4 - 6)      Assessment:   20M w/ a hx of UC who is now s/p (4/9) subtotal colectomy with end ileostomy    PLAN:  -OOB as tolerated  -DVT ppx  -Reg diet  -Will continue to monitor ostomy function    Pediatric Surgery  [id number]

## 2021-04-11 NOTE — PROGRESS NOTE PEDS - ATTENDING COMMENTS
POD 2  doing well  minimal pain; 650 cc stoma output  abd soft and benign; stoma looks good  plan for d/c today; discussed with patient and mom  our team will follow up closely with family after d/c
POD 1  looks great  awake and alert; minimal pain  ostomy functioning  abd soft; wounds okay; stoma fine  reg diet  doing very well  discussed with Juan and Mom.

## 2021-04-11 NOTE — DISCHARGE NOTE PROVIDER - NSDCFUADDAPPT_GEN_ALL_CORE_FT
PAIN: You may continue to take Acetaminophen (Tylenol) and Ibuprofen over the counter for pain as needed. You can alternate the two medications, giving one every 3 hours  WOUND CARE:  You should allow warm soapy water to run down the wound in the shower. You should not need to scrub the area. You do not have any stitches that need to be removed. If you have glue or steri-strips on your wound, it will fall off on its own.  BATHING: Please do not soak or submerge the wound in water (bath, swimming) for 14 days after your surgery.  ACTIVITY: No heavy lifting, straining, or vigorous activity until your follow-up appointment in 2 weeks.   NOTIFY US IF: Your child has any bleeding that does not stop, any pus draining from his/her wound(s), any fever (over 100.5 F) or chills, persistent nausea/vomiting, persistent diarrhea, or if his/her pain is not controlled on their discharge pain medications.  FOLLOW-UP: Please call the office and make an appointment to follow up with Dr. Liang from Pediatric Surgery and Dr. Martin from Gastroenterology in 2 weeks.  Please follow up with your primary care physician in 1-2 weeks regarding your hospitalization.       **PLEASE NOTE OUR CORRECT CLINIC ADDRESS IS [address] [place] [address], [address]ITE [facility], [place] [address]. OUR CORRECT PHONE NUMBER IS [phone].**

## 2021-04-11 NOTE — DISCHARGE NOTE PROVIDER - NSDCCPTREATMENT_GEN_ALL_CORE_FT
PRINCIPAL PROCEDURE  Procedure: Laparoscopic assisted subtotal colectomy  Findings and Treatment:

## 2021-04-11 NOTE — DISCHARGE NOTE PROVIDER - HOSPITAL COURSE
21 y/o M w/ significant PMH of ulcerative colitis and recent clostridium difficile infection three weeks prior to admission which was treated with vancomycin.  Most recent Remicade infusion was 1 week prior to admission. He denied any recent illness within the 2 weeks prior to admission. He underwent laparoscopic assisted subtotal colectomy with end ileostomy on 4/9/2021. The surgery was tolerated well, and his diet was advanced to regular diet on POD1. The remainder of his hospital course was unremarkable, and he was discharged home on POD2. On the day of discharge, patient was ambulating, tolerating PO, electrolytes repleted as necessary, performing ADLs as necessary, stable for discharge with appropriate outpatient care and follow-up.

## 2021-04-12 ENCOUNTER — NON-APPOINTMENT (OUTPATIENT)
Age: 21
End: 2021-04-12

## 2021-04-12 PROBLEM — Z86.19 PERSONAL HISTORY OF OTHER INFECTIOUS AND PARASITIC DISEASES: Chronic | Status: ACTIVE | Noted: 2021-04-07

## 2021-04-20 LAB — SURGICAL PATHOLOGY STUDY: SIGNIFICANT CHANGE UP

## 2021-04-21 ENCOUNTER — APPOINTMENT (OUTPATIENT)
Dept: PEDIATRIC SURGERY | Facility: CLINIC | Age: 21
End: 2021-04-21
Payer: COMMERCIAL

## 2021-04-21 VITALS
HEART RATE: 107 BPM | SYSTOLIC BLOOD PRESSURE: 163 MMHG | DIASTOLIC BLOOD PRESSURE: 83 MMHG | WEIGHT: 193.57 LBS | TEMPERATURE: 98.2 F | OXYGEN SATURATION: 100 %

## 2021-04-21 PROCEDURE — 99024 POSTOP FOLLOW-UP VISIT: CPT

## 2021-04-21 NOTE — ASSESSMENT
[FreeTextEntry1] : Juan is a 21 yo male with hx Ulcerative Colitis refractory to medical management.  He is now almost 2 weeks post op from subtotal colectomy with end ileostomy. \par His pathology is consistent with ulcerative colitis.  Overall he is doing well.  He is passing some mucosy bloody output from his anus 2-3x per day which is to be expected due to rectal stump that is still present.   He is doing well with ostomy care, i will [facility] a flange w a tape boarder. His stool is a good consistency and he has not required any Imodium but is aware he can take it if the output is greater 1L in 24 hours.  Discussed the need to contact us if the output is consistently high and he is not responding to Imodium.  or he develops any cramping with passing bloody mucus.   HE can f/u in 2 months sooner if having any issues.  All questions answered, pathology results reviewed with the family.

## 2021-04-21 NOTE — PHYSICAL EXAM
[Clean] : clean [Dry] : dry [NL] : soft, not tender, not distended [Erythema] : no erythema [Granulation tissue] : no granulation tissue [Drainage] : no drainage [TextBox_37] : stoma pink and functioning

## 2021-04-21 NOTE — REASON FOR VISIT
[Patient] : patient [_____ Day(s)] : [unfilled] day(s)  [Other: ____] : [unfilled] [Mother] : mother [Normal bowel movements] : ~He/She~ has normal bowel movements [Tolerating Diet] : ~He/She~ is tolerating diet [Pain] : ~He/She~ does not have pain [Fever] : ~He/She~ does not have fever [Vomiting] : ~He/She~ does not have vomiting [Redness at incision] : ~He/She~ does not have redness at incision [Drainage at incision] : ~He/She~ does not have drainage at incision [Swelling at surgical site] : ~He/She~ does not have swelling at surgical site [de-identified] : 4-9-21 [de-identified] : Dr Liang [de-identified] : Juan is a 21 yo male with hx Ulcerative Colitis refractory to medical management.  He is now almost 2 weeks post op from subtotal colectomy with end ileostomy. \par His pathology is consistent with ulcerative colitis.  He is doing well, he was d/c home POD #2.  He is changing his ostomy appliance q 2-3 days. they do not like the new flanges there is no tape boarder.  Was having up to 900 mls qd post op it is down to 500 mls in 24 hours.  He has not had to take any Imodium but is aware that he should start it 2mg if the output is greater 1000mls in 24 hours.  Having some mucous bloody bowel movements without cramping 2-3 x per day which is to be expected.  Overall he is feeling well his activity is improving and he has a good appetite.  Is paying attention to his ileostomy diet and good fluid intake.

## 2021-04-21 NOTE — CONSULT LETTER
[Dear  ___] : Dear  [unfilled], [Consult Letter:] : I had the pleasure of evaluating your patient, [unfilled]. [Please see my note below.] : Please see my note below. [Consult Closing:] : Thank you very much for allowing me to participate in the care of this patient.  If you have any questions, please do not hesitate to contact me. [Sincerely,] : Sincerely, [FreeTextEntry2] : Nicholas Holland MD\par [address] \[facility] [address], [place] [id number] [FreeTextEntry3] : Pedro Liang MD FAAP FACS\par Director, [facility]\par Division of Pediatric General, Thoracic and Endoscopic Surgery\par [facility]

## 2021-04-28 ENCOUNTER — APPOINTMENT (OUTPATIENT)
Dept: PEDIATRIC SURGERY | Facility: CLINIC | Age: 21
End: 2021-04-28
Payer: COMMERCIAL

## 2021-04-28 PROCEDURE — 99024 POSTOP FOLLOW-UP VISIT: CPT

## 2021-04-28 NOTE — ASSESSMENT
[FreeTextEntry1] : Juan is a 19 yo male with hx Ulcerative Colitis refractory to medical management.  He is now 2.5 weeks post op from subtotal colectomy with end ileostomy. He is feeling better and passing thick watery stool about 800 mls per day via his ileostomy,  not on any Imodium.  He is having problems with his wafer not adhering to the skin and having peristomal erythema from stool seeping under the wafer.   They are cutting the flange too big so stool is seeping under the wafer.  I redressed the stoma w powder, Cavilon, a convex flange for  inverted stoma and told them to use the ostomy belt.  Mom will email me tomorrow and let me know how it fits.  I will resend scripts to [facility].  Counselled them about not cutting the flange too tight to injure the side of the stoma.

## 2021-04-28 NOTE — PHYSICAL EXAM
[Clean] : clean [Dry] : dry [Intact] : intact [Erythema] : no erythema [Granulation tissue] : no granulation tissue [Drainage] : no drainage [NL] : soft, not tender, not distended [TextBox_86] : stoma above skin level with peristomal circumferential erythema

## 2021-04-28 NOTE — CONSULT LETTER
[Dear  ___] : Dear  [unfilled], [Consult Letter:] : I had the pleasure of evaluating your patient, [unfilled]. [Please see my note below.] : Please see my note below. [Consult Closing:] : Thank you very much for allowing me to participate in the care of this patient.  If you have any questions, please do not hesitate to contact me. [Sincerely,] : Sincerely, [FreeTextEntry2] : Nicholas Holland MD\par [address] \[facility] [address], [place] [id number] [FreeTextEntry3] : Margoth Maguire  MSN  CPNP\par Pediatric Nurse Practitioner\par Department of Pediatric Surgery\par Health system\par phone [phone]\par fax [phone]\par

## 2021-04-28 NOTE — REASON FOR VISIT
[Patient] : patient [Mother] : mother [____ Week(s)] : [unfilled] week(s)  [Other: ____] : [unfilled] [Pain] : ~He/She~ does not have pain [Fever] : ~He/She~ does not have fever [Normal bowel movements] : ~He/She~ has normal bowel movements [Vomiting] : ~He/She~ does not have vomiting [Tolerating Diet] : ~He/She~ is tolerating diet [Redness at incision] : ~He/She~ does not have redness at incision [Drainage at incision] : ~He/She~ does not have drainage at incision [Swelling at surgical site] : ~He/She~ does not have swelling at surgical site [de-identified] : 4-9-21 [de-identified] : Dr Liang [de-identified] : Juan is a 21 yo male with hx Ulcerative Colitis refractory to medical management.  He is now 2.5 weeks post op from subtotal colectomy with end ileostomy. \par His pathology is consistent with ulcerative colitis.  He is doing well, he was d/c home POD #2.  He is changing his ostomy appliance q 2-3 days but having trouble with leaking and peristomal skin irritation.   Having 700-800 mls of thick liquid stool  in 24 hours.  He has not had to take any Imodium but is aware that he should start it 2mg if the output is greater 1000mls in 24 hours.  Having some mucous bloody bowel movements without cramping 2-3 x per day which is to be expected.  Overall he is feeling well his activity is improving and he has a good appetite.  Is paying attention to his ileostomy diet and good fluid intake. \par He presents to the office today due to leaking and irritation in the peristomal area.  He thinks the ostomy inverted somewhat.

## 2021-05-01 ENCOUNTER — RX RENEWAL (OUTPATIENT)
Age: 21
End: 2021-05-01

## 2021-06-09 ENCOUNTER — APPOINTMENT (OUTPATIENT)
Dept: PEDIATRIC SURGERY | Facility: CLINIC | Age: 21
End: 2021-06-09
Payer: COMMERCIAL

## 2021-06-09 VITALS — WEIGHT: 224.21 LBS | TEMPERATURE: 98 F

## 2021-06-09 PROCEDURE — 99214 OFFICE O/P EST MOD 30 MIN: CPT | Mod: 24

## 2021-06-09 NOTE — PHYSICAL EXAM
[NL] : grossly intact [TextBox_37] : stoma is pink, proud from the skin, and healthy with thick stool at the OS. appliance intact and patient requested not to change it

## 2021-06-09 NOTE — REASON FOR VISIT
[Patient] : patient [Mother] : mother [____ Week(s)] : [unfilled] week(s)  [Other: ____] : [unfilled] [Normal bowel movements] : ~He/She~ has normal bowel movements [Tolerating Diet] : ~He/She~ is tolerating diet [Pain] : ~He/She~ does not have pain [Fever] : ~He/She~ does not have fever [Vomiting] : ~He/She~ does not have vomiting [Redness at incision] : ~He/She~ does not have redness at incision [Drainage at incision] : ~He/She~ does not have drainage at incision [Swelling at surgical site] : ~He/She~ does not have swelling at surgical site [de-identified] : 4-9-21 [de-identified] : Dr Liang [de-identified] : Juan is a 21 yo male with hx Ulcerative Colitis refractory to medical management.  He is now 8 weeks post op from subtotal colectomy with end ileostomy. \par His pathology is consistent with ulcerative colitis.  He is doing well, he was d/c home POD #2.  He is changing his ostomy appliance q 2-3 days.  Having 700-800 mls of thick liquid stool  in 24 hours.  He has not had to take any Imodium but is aware that he should start it 2mg if the output is greater 1000mls in 24 hours.  Having some mucous bloody bowel movements without cramping 2-3 x per day which is to be expected.  Overall he is feeling well his activity is improving and he has a good appetite.  Is paying attention to his ileostomy diet and good fluid intake. \par He presents to the office today due to leaking and irritation in the peristomal area.  He thinks the ostomy inverted somewhat which is now better. Overall, he is feeling very well. The irritation in the peristomal area have improved. He is taking 2 mg Imodium at night which works well. The bag gets very full during the night with stool and gas. He has the urge to have a bowel movement 4 times per day and passes small amount of bloody mucous. He does not have any associated cramping. He has a good appetite.

## 2021-06-09 NOTE — CONSULT LETTER
[Dear  ___] : Dear  [unfilled], [Consult Letter:] : I had the pleasure of evaluating your patient, [unfilled]. [Please see my note below.] : Please see my note below. [Consult Closing:] : Thank you very much for allowing me to participate in the care of this patient.  If you have any questions, please do not hesitate to contact me. [Sincerely,] : Sincerely, [FreeTextEntry2] : Nicholas oHlland MD\par [address] \[facility] [address], [place] [id number] [FreeTextEntry3] : Margoth Maguire  MSN  CPNP\par Pediatric Nurse Practitioner\par Department of Pediatric Surgery\par [facility]\par phone [phone]\par fax [phone]\par

## 2021-06-09 NOTE — ASSESSMENT
[FreeTextEntry1] : Juan is a 20 year old male here two months after a laparoscopic subtotal colectomy with end ileostomy. We had a lengthy discussion about the next steps in Juan's surgical management and we all agreed that we would proceed with proctectomy and J-pouch creation in the beginning of the summer. I discussed the pros and cons of a diverting stoma and we agreed that I would attempt the procedure without a stoma unless there was a technical intraoperative reason to divert. We talked about all of the various short-term and long-term complications of j-pouches, including but not limited to bleeding, infection, anastomotic leak requiring IR drainage, antibiotics, or return to OR for diversion, pouchitis, conversion to Crohn disease, nerve injury, small bowel obstructions, and leakage of stool. We also talked about the possibility, although rare, of pouch loss. The family agreed with the plan to proceed. We will plan for his surgery in August. With regards to him passing bloody mucous, he should follow up with Dr. Martin for local treatment. He should continue on 2 mg of Imodium per day.  All questions answered. \par

## 2021-08-16 ENCOUNTER — OUTPATIENT (OUTPATIENT)
Dept: OUTPATIENT SERVICES | Age: 21
LOS: 1 days | End: 2021-08-16

## 2021-08-16 VITALS
RESPIRATION RATE: 16 BRPM | TEMPERATURE: 98 F | OXYGEN SATURATION: 98 % | SYSTOLIC BLOOD PRESSURE: 149 MMHG | HEART RATE: 118 BPM | DIASTOLIC BLOOD PRESSURE: 76 MMHG | WEIGHT: 232.37 LBS | HEIGHT: 70.94 IN

## 2021-08-16 DIAGNOSIS — F41.9 ANXIETY DISORDER, UNSPECIFIED: ICD-10-CM

## 2021-08-16 DIAGNOSIS — Z93.9 ARTIFICIAL OPENING STATUS, UNSPECIFIED: Chronic | ICD-10-CM

## 2021-08-16 DIAGNOSIS — Z91.89 OTHER SPECIFIED PERSONAL RISK FACTORS, NOT ELSEWHERE CLASSIFIED: ICD-10-CM

## 2021-08-16 DIAGNOSIS — Z90.49 ACQUIRED ABSENCE OF OTHER SPECIFIED PARTS OF DIGESTIVE TRACT: ICD-10-CM

## 2021-08-16 DIAGNOSIS — Z90.49 ACQUIRED ABSENCE OF OTHER SPECIFIED PARTS OF DIGESTIVE TRACT: Chronic | ICD-10-CM

## 2021-08-16 DIAGNOSIS — Z01.812 ENCOUNTER FOR PREPROCEDURAL LABORATORY EXAMINATION: ICD-10-CM

## 2021-08-16 DIAGNOSIS — K51.90 ULCERATIVE COLITIS, UNSPECIFIED, WITHOUT COMPLICATIONS: ICD-10-CM

## 2021-08-16 DIAGNOSIS — Z98.890 OTHER SPECIFIED POSTPROCEDURAL STATES: Chronic | ICD-10-CM

## 2021-08-16 LAB
ALBUMIN SERPL ELPH-MCNC: 4.7 G/DL — SIGNIFICANT CHANGE UP (ref 3.3–5)
ALP SERPL-CCNC: 103 U/L — SIGNIFICANT CHANGE UP (ref 40–120)
ALT FLD-CCNC: 27 U/L — SIGNIFICANT CHANGE UP (ref 4–41)
ANION GAP SERPL CALC-SCNC: 17 MMOL/L — HIGH (ref 7–14)
AST SERPL-CCNC: 22 U/L — SIGNIFICANT CHANGE UP (ref 4–40)
BILIRUB SERPL-MCNC: 0.4 MG/DL — SIGNIFICANT CHANGE UP (ref 0.2–1.2)
BLD GP AB SCN SERPL QL: NEGATIVE — SIGNIFICANT CHANGE UP
BUN SERPL-MCNC: 12 MG/DL — SIGNIFICANT CHANGE UP (ref 7–23)
CALCIUM SERPL-MCNC: 10.1 MG/DL — SIGNIFICANT CHANGE UP (ref 8.4–10.5)
CHLORIDE SERPL-SCNC: 102 MMOL/L — SIGNIFICANT CHANGE UP (ref 98–107)
CO2 SERPL-SCNC: 19 MMOL/L — LOW (ref 22–31)
CREAT SERPL-MCNC: 0.8 MG/DL — SIGNIFICANT CHANGE UP (ref 0.5–1.3)
GLUCOSE SERPL-MCNC: 86 MG/DL — SIGNIFICANT CHANGE UP (ref 70–99)
HCT VFR BLD CALC: 45.3 % — SIGNIFICANT CHANGE UP (ref 39–50)
HGB BLD-MCNC: 14.7 G/DL — SIGNIFICANT CHANGE UP (ref 13–17)
MCHC RBC-ENTMCNC: 25.7 PG — LOW (ref 27–34)
MCHC RBC-ENTMCNC: 32.5 GM/DL — SIGNIFICANT CHANGE UP (ref 32–36)
MCV RBC AUTO: 79.2 FL — LOW (ref 80–100)
NRBC # BLD: 0 /100 WBCS — SIGNIFICANT CHANGE UP
NRBC # FLD: 0 K/UL — SIGNIFICANT CHANGE UP
PLATELET # BLD AUTO: 382 K/UL — SIGNIFICANT CHANGE UP (ref 150–400)
POTASSIUM SERPL-MCNC: 4.4 MMOL/L — SIGNIFICANT CHANGE UP (ref 3.5–5.3)
POTASSIUM SERPL-SCNC: 4.4 MMOL/L — SIGNIFICANT CHANGE UP (ref 3.5–5.3)
PROT SERPL-MCNC: 8.1 G/DL — SIGNIFICANT CHANGE UP (ref 6–8.3)
RBC # BLD: 5.72 M/UL — SIGNIFICANT CHANGE UP (ref 4.2–5.8)
RBC # FLD: 14 % — SIGNIFICANT CHANGE UP (ref 10.3–14.5)
RH IG SCN BLD-IMP: POSITIVE — SIGNIFICANT CHANGE UP
SODIUM SERPL-SCNC: 138 MMOL/L — SIGNIFICANT CHANGE UP (ref 135–145)
WBC # BLD: 10.59 K/UL — HIGH (ref 3.8–10.5)
WBC # FLD AUTO: 10.59 K/UL — HIGH (ref 3.8–10.5)

## 2021-08-16 NOTE — H&P PST ADULT - HISTORY OF PRESENT ILLNESS
19 yo male with ulcerative colitis, s/p subtotal colectomy with ostomy with Dr. Liang 4/2021 at [facility] [place], now scheduled for laparoscopic complete proctectomy with ileal j pouch anal anastomosis possible diverting ileostomy on 8/20/2021 at [facility].  Denies abdominal pain, changes to voiding/stooling, nausea, or breakdown around the stoma.

## 2021-08-16 NOTE — H&P PST ADULT - EAR CANAL L
Bed: 10  Expected date:   Expected time:   Means of arrival:   Comments:  [address] [facility]  07/17[birth year]19 9680
normal

## 2021-08-16 NOTE — H&P PST ADULT - ATTENDING COMMENTS
ARIADNA ONEAL is a 20y male with UC s/p subtotal colectomy/ileostomy, now doing well, here for laparoscopic possible open completion proctectomy, ileal pouch anal anastomosis, possible diverting loop ileostomy.   I have discussed all of the risks benefits and alternatives of the procedure.  I specifically discussed the short-term risks of bleeding, twisting of the pouch, ileus, and anastomotic leak potentially requiring an operation for an emergency washout and ileostomy, prolonged antibiotics or interventional radiologic drainage of an abscess.  We had a long conversation about the role of a protecting diverting loop ileostomy and he would like to proceed without it unless it is technically indicated due to an issue with tension or the anastomosis itself.  I also discussed the long-term risks of pouchitis, frequent bowel movements, leakage, bowel obstructions, need for future procedures, and pouch loss.  Consent is signed and on chart.

## 2021-08-16 NOTE — H&P PST ADULT - ASSESSMENT
21 yo male with ulcerative colitis, s/p subtotal colectomy with ostomy with Dr. Linag 4/2021 at [facility] [place], now scheduled for laparoscopic complete proctectomy with ileal j pouch anal anastomosis possible diverting ileostomy on 8/20/2021 at [facility].

## 2021-08-16 NOTE — H&P PST ADULT - PROBLEM SELECTOR PLAN 5
Reviewed possible need for pre-procedural oral anxiolytic; patient/parent reports understanding and agrees to plan.

## 2021-08-16 NOTE — H&P PST ADULT - PROBLEM SELECTOR PLAN 4
Patient was advised to wash the entire body with soap and water in the morning, prior to procedure. Wash hair with shampoo and water. No lotions, creams, hair products or piercings. Patient/parent reports understanding on when and how to complete preoperative care.

## 2021-08-16 NOTE — H&P PST ADULT - GASTROINTESTINAL DETAILS
ostomy present to right side of abdomen; no focal tenderness, unable to assess skin at site/non translucent bag/soft/nontender

## 2021-08-16 NOTE — H&P PST ADULT - NEGATIVE HEMATOLOGY SYMPTOMS
denies hx blood transfusion; Based on the Pediatric Bleeding Risk Assessment Questionnaire that is utilized (formulated from the PBQ), no increased risk for bleeding is identified at this time.

## 2021-08-16 NOTE — H&P PST ADULT - REASON FOR ADMISSION
Presurgical Assessment/testing for:  Doctor: Presurgical Assessment/testing for: laparoscopic complete proctectomy with ileal j pouch anal anastomosis possible diverting ileostomy on 8/20/2021 at [facility] [place]  Doctor:

## 2021-08-16 NOTE — H&P PST ADULT - PROBLEM SELECTOR PLAN 1
19 yo male with ulcerative colitis, s/p subtotal colectomy with ostomy with Dr. Liang 4/2021 at [facility] [place], now scheduled for laparoscopic complete proctectomy with ileal j pouch anal anastomosis possible diverting ileostomy on 8/20/2021 at [facility].

## 2021-08-16 NOTE — H&P PST ADULT - NSICDXPASTSURGICALHX_GEN_ALL_CORE_FT
PAST SURGICAL HISTORY:  History of creation of ostomy 4/2021    S/P colonoscopy & endoscopy 2017     PAST SURGICAL HISTORY:  History of creation of ostomy 4/2021    S/P colectomy 4/2021 subtotal colectomy    S/P colonoscopy & endoscopy 2017

## 2021-08-17 ENCOUNTER — APPOINTMENT (OUTPATIENT)
Dept: PEDIATRIC SURGERY | Facility: CLINIC | Age: 21
End: 2021-08-17

## 2021-08-17 RX ORDER — MIDAZOLAM HYDROCHLORIDE 1 MG/ML
20 INJECTION, SOLUTION INTRAMUSCULAR; INTRAVENOUS ONCE
Refills: 0 | Status: DISCONTINUED | OUTPATIENT
Start: 2021-08-20 | End: 2021-08-26

## 2021-08-18 LAB — SARS-COV-2 N GENE NPH QL NAA+PROBE: NOT DETECTED

## 2021-08-20 ENCOUNTER — INPATIENT (INPATIENT)
Age: 21
LOS: 11 days | Discharge: HOME | End: 2021-09-01
Attending: HOSPITALIST | Admitting: HOSPITALIST
Payer: COMMERCIAL

## 2021-08-20 ENCOUNTER — RESULT REVIEW (OUTPATIENT)
Age: 21
End: 2021-08-20

## 2021-08-20 VITALS
RESPIRATION RATE: 18 BRPM | DIASTOLIC BLOOD PRESSURE: 81 MMHG | OXYGEN SATURATION: 99 % | TEMPERATURE: 99 F | SYSTOLIC BLOOD PRESSURE: 145 MMHG | HEART RATE: 130 BPM | WEIGHT: 232.37 LBS | HEIGHT: 70.94 IN

## 2021-08-20 DIAGNOSIS — Z93.9 ARTIFICIAL OPENING STATUS, UNSPECIFIED: Chronic | ICD-10-CM

## 2021-08-20 DIAGNOSIS — Z90.49 ACQUIRED ABSENCE OF OTHER SPECIFIED PARTS OF DIGESTIVE TRACT: Chronic | ICD-10-CM

## 2021-08-20 DIAGNOSIS — K51.90 ULCERATIVE COLITIS, UNSPECIFIED, WITHOUT COMPLICATIONS: ICD-10-CM

## 2021-08-20 DIAGNOSIS — Z98.890 OTHER SPECIFIED POSTPROCEDURAL STATES: Chronic | ICD-10-CM

## 2021-08-20 PROCEDURE — 88307 TISSUE EXAM BY PATHOLOGIST: CPT | Mod: 26

## 2021-08-20 PROCEDURE — 45397 LAP REMOVE RECTUM W/POUCH: CPT

## 2021-08-20 PROCEDURE — 88305 TISSUE EXAM BY PATHOLOGIST: CPT | Mod: 26

## 2021-08-20 PROCEDURE — 88304 TISSUE EXAM BY PATHOLOGIST: CPT | Mod: 26

## 2021-08-20 RX ORDER — KETOROLAC TROMETHAMINE 30 MG/ML
30 SYRINGE (ML) INJECTION EVERY 6 HOURS
Refills: 0 | Status: DISCONTINUED | OUTPATIENT
Start: 2021-08-20 | End: 2021-08-25

## 2021-08-20 RX ORDER — FENTANYL CITRATE 50 UG/ML
50 INJECTION INTRAVENOUS
Refills: 0 | Status: DISCONTINUED | OUTPATIENT
Start: 2021-08-20 | End: 2021-08-21

## 2021-08-20 RX ORDER — HEPARIN SODIUM 5000 [USP'U]/ML
5000 INJECTION INTRAVENOUS; SUBCUTANEOUS EVERY 8 HOURS
Refills: 0 | Status: DISCONTINUED | OUTPATIENT
Start: 2021-08-20 | End: 2021-08-27

## 2021-08-20 RX ORDER — MORPHINE SULFATE 50 MG/1
2 CAPSULE, EXTENDED RELEASE ORAL EVERY 4 HOURS
Refills: 0 | Status: DISCONTINUED | OUTPATIENT
Start: 2021-08-20 | End: 2021-08-21

## 2021-08-20 RX ORDER — SODIUM CHLORIDE 9 MG/ML
1000 INJECTION, SOLUTION INTRAVENOUS
Refills: 0 | Status: DISCONTINUED | OUTPATIENT
Start: 2021-08-20 | End: 2021-08-20

## 2021-08-20 RX ORDER — MORPHINE SULFATE 50 MG/1
4 CAPSULE, EXTENDED RELEASE ORAL EVERY 4 HOURS
Refills: 0 | Status: DISCONTINUED | OUTPATIENT
Start: 2021-08-20 | End: 2021-08-21

## 2021-08-20 RX ORDER — ONDANSETRON 8 MG/1
4 TABLET, FILM COATED ORAL ONCE
Refills: 0 | Status: DISCONTINUED | OUTPATIENT
Start: 2021-08-20 | End: 2021-08-21

## 2021-08-20 RX ORDER — HYDROMORPHONE HYDROCHLORIDE 2 MG/ML
0.5 INJECTION INTRAMUSCULAR; INTRAVENOUS; SUBCUTANEOUS
Refills: 0 | Status: DISCONTINUED | OUTPATIENT
Start: 2021-08-20 | End: 2021-08-21

## 2021-08-20 RX ORDER — DEXTROSE MONOHYDRATE, SODIUM CHLORIDE, AND POTASSIUM CHLORIDE 50; .745; 4.5 G/1000ML; G/1000ML; G/1000ML
1000 INJECTION, SOLUTION INTRAVENOUS
Refills: 0 | Status: DISCONTINUED | OUTPATIENT
Start: 2021-08-20 | End: 2021-08-25

## 2021-08-20 RX ORDER — PIPERACILLIN AND TAZOBACTAM 4; .5 G/20ML; G/20ML
3000 INJECTION, POWDER, LYOPHILIZED, FOR SOLUTION INTRAVENOUS EVERY 6 HOURS
Refills: 0 | Status: DISCONTINUED | OUTPATIENT
Start: 2021-08-20 | End: 2021-08-22

## 2021-08-20 RX ORDER — ACETAMINOPHEN 500 MG
650 TABLET ORAL EVERY 6 HOURS
Refills: 0 | Status: DISCONTINUED | OUTPATIENT
Start: 2021-08-20 | End: 2021-08-21

## 2021-08-20 RX ADMIN — Medication 30 MILLIGRAM(S): at 21:00

## 2021-08-20 RX ADMIN — Medication 30 MILLIGRAM(S): at 21:22

## 2021-08-20 RX ADMIN — FENTANYL CITRATE 50 MICROGRAM(S): 50 INJECTION INTRAVENOUS at 21:41

## 2021-08-20 RX ADMIN — FENTANYL CITRATE 50 MICROGRAM(S): 50 INJECTION INTRAVENOUS at 22:00

## 2021-08-20 NOTE — ASU PATIENT PROFILE, PEDIATRIC - LOW RISK FALLS INTERVENTIONS (SCORE 7-11)
Orientation to room/Bed in low position, brakes on/Side rails x 2 or 4 up, assess large gaps, such that a patient could get extremity or other body part entrapped, use additional safety procedures/Use of non-skid footwear for ambulating patients, use of appropriate size clothing to prevent risk of tripping/Assess eliminations need, assist as needed/Assess for adequate lighting, leave nightlight on

## 2021-08-20 NOTE — ASU PATIENT PROFILE, PEDIATRIC - REASON FOR ADMISSION, PROFILE
laparoscopic complete proctectomy with ileal J pouch, anal anastomosis, possible diverting ileostomy

## 2021-08-20 NOTE — ASU PATIENT PROFILE, PEDIATRIC - TEACHING/LEARNING EDUCATIONAL LEVEL PEDS
Addended by: ELIAS SOTELO on: 2/20/2017 05:25 PM     Modules accepted: Orders    
career/technical training

## 2021-08-21 RX ORDER — HYDROMORPHONE HYDROCHLORIDE 2 MG/ML
30 INJECTION INTRAMUSCULAR; INTRAVENOUS; SUBCUTANEOUS
Refills: 0 | Status: DISCONTINUED | OUTPATIENT
Start: 2021-08-21 | End: 2021-08-21

## 2021-08-21 RX ORDER — HYDROMORPHONE HYDROCHLORIDE 2 MG/ML
30 INJECTION INTRAMUSCULAR; INTRAVENOUS; SUBCUTANEOUS
Refills: 0 | Status: DISCONTINUED | OUTPATIENT
Start: 2021-08-21 | End: 2021-08-24

## 2021-08-21 RX ORDER — DEXAMETHASONE 0.5 MG/5ML
4 ELIXIR ORAL EVERY 6 HOURS
Refills: 0 | Status: DISCONTINUED | OUTPATIENT
Start: 2021-08-21 | End: 2021-08-21

## 2021-08-21 RX ORDER — ONDANSETRON 8 MG/1
4 TABLET, FILM COATED ORAL EVERY 8 HOURS
Refills: 0 | Status: DISCONTINUED | OUTPATIENT
Start: 2021-08-21 | End: 2021-09-01

## 2021-08-21 RX ORDER — ACETAMINOPHEN 500 MG
650 TABLET ORAL EVERY 6 HOURS
Refills: 0 | Status: COMPLETED | OUTPATIENT
Start: 2021-08-21 | End: 2021-08-21

## 2021-08-21 RX ORDER — NALOXONE HYDROCHLORIDE 4 MG/.1ML
0.1 SPRAY NASAL
Refills: 0 | Status: DISCONTINUED | OUTPATIENT
Start: 2021-08-21 | End: 2021-08-21

## 2021-08-21 RX ORDER — ONDANSETRON 8 MG/1
4 TABLET, FILM COATED ORAL EVERY 8 HOURS
Refills: 0 | Status: DISCONTINUED | OUTPATIENT
Start: 2021-08-21 | End: 2021-08-21

## 2021-08-21 RX ORDER — NALOXONE HYDROCHLORIDE 4 MG/.1ML
0.1 SPRAY NASAL
Refills: 0 | Status: DISCONTINUED | OUTPATIENT
Start: 2021-08-21 | End: 2021-08-26

## 2021-08-21 RX ORDER — DEXAMETHASONE 0.5 MG/5ML
4 ELIXIR ORAL EVERY 6 HOURS
Refills: 0 | Status: DISCONTINUED | OUTPATIENT
Start: 2021-08-21 | End: 2021-08-24

## 2021-08-21 RX ADMIN — MORPHINE SULFATE 8 MILLIGRAM(S): 50 CAPSULE, EXTENDED RELEASE ORAL at 10:28

## 2021-08-21 RX ADMIN — Medication 260 MILLIGRAM(S): at 01:25

## 2021-08-21 RX ADMIN — HYDROMORPHONE HYDROCHLORIDE 30 MILLILITER(S): 2 INJECTION INTRAMUSCULAR; INTRAVENOUS; SUBCUTANEOUS at 23:03

## 2021-08-21 RX ADMIN — Medication 260 MILLIGRAM(S): at 20:42

## 2021-08-21 RX ADMIN — DEXTROSE MONOHYDRATE, SODIUM CHLORIDE, AND POTASSIUM CHLORIDE 140 MILLILITER(S): 50; .745; 4.5 INJECTION, SOLUTION INTRAVENOUS at 19:10

## 2021-08-21 RX ADMIN — Medication 260 MILLIGRAM(S): at 08:18

## 2021-08-21 RX ADMIN — Medication 260 MILLIGRAM(S): at 14:00

## 2021-08-21 RX ADMIN — MORPHINE SULFATE 4 MILLIGRAM(S): 50 CAPSULE, EXTENDED RELEASE ORAL at 11:00

## 2021-08-21 RX ADMIN — MORPHINE SULFATE 4 MILLIGRAM(S): 50 CAPSULE, EXTENDED RELEASE ORAL at 15:47

## 2021-08-21 RX ADMIN — Medication 650 MILLIGRAM(S): at 09:00

## 2021-08-21 RX ADMIN — HEPARIN SODIUM 5000 UNIT(S): 5000 INJECTION INTRAVENOUS; SUBCUTANEOUS at 17:04

## 2021-08-21 RX ADMIN — Medication 30 MILLIGRAM(S): at 05:40

## 2021-08-21 RX ADMIN — ONDANSETRON 8 MILLIGRAM(S): 8 TABLET, FILM COATED ORAL at 23:35

## 2021-08-21 RX ADMIN — DEXTROSE MONOHYDRATE, SODIUM CHLORIDE, AND POTASSIUM CHLORIDE 140 MILLILITER(S): 50; .745; 4.5 INJECTION, SOLUTION INTRAVENOUS at 07:06

## 2021-08-21 RX ADMIN — PIPERACILLIN AND TAZOBACTAM 100 MILLIGRAM(S): 4; .5 INJECTION, POWDER, LYOPHILIZED, FOR SOLUTION INTRAVENOUS at 19:10

## 2021-08-21 RX ADMIN — PIPERACILLIN AND TAZOBACTAM 100 MILLIGRAM(S): 4; .5 INJECTION, POWDER, LYOPHILIZED, FOR SOLUTION INTRAVENOUS at 00:44

## 2021-08-21 RX ADMIN — HEPARIN SODIUM 5000 UNIT(S): 5000 INJECTION INTRAVENOUS; SUBCUTANEOUS at 09:31

## 2021-08-21 RX ADMIN — MORPHINE SULFATE 2 MILLIGRAM(S): 50 CAPSULE, EXTENDED RELEASE ORAL at 14:30

## 2021-08-21 RX ADMIN — Medication 30 MILLIGRAM(S): at 12:15

## 2021-08-21 RX ADMIN — PIPERACILLIN AND TAZOBACTAM 100 MILLIGRAM(S): 4; .5 INJECTION, POWDER, LYOPHILIZED, FOR SOLUTION INTRAVENOUS at 05:58

## 2021-08-21 RX ADMIN — Medication 30 MILLIGRAM(S): at 18:16

## 2021-08-21 RX ADMIN — PIPERACILLIN AND TAZOBACTAM 100 MILLIGRAM(S): 4; .5 INJECTION, POWDER, LYOPHILIZED, FOR SOLUTION INTRAVENOUS at 12:46

## 2021-08-21 NOTE — PHYSICAL THERAPY INITIAL EVALUATION ADULT - GENERAL OBSERVATIONS, REHAB EVAL
Pt received semi supine in bed in NAD, +RADHA, +gonzalez, +IV RUE. MOC present. Cleared for PT eval as per RN.

## 2021-08-21 NOTE — PROGRESS NOTE PEDS - SUBJECTIVE AND OBJECTIVE BOX
PEDIATRIC GENERAL SURGERY PROGRESS NOTE    Ulcerative colitis without complications        ARIADNA ONEAL  |  [id number]      Patient is a 20y Male s/p laparoscopic revision proctectomy with ileo anal J pouch with diverting loop ileostomy on 8/20.      S: No acute events overnight. Pt seen on AM rounds. Pt......    O:   Vital Signs Last 24 Hrs  T(C): 36.9 (21 Aug 2021 00:14), Max: 37.4 (20 Aug 2021 22:00)  T(F): 98.5 (21 Aug 2021 00:14), Max: 99.3 (20 Aug 2021 22:00)  HR: 118 (21 Aug 2021 00:14) (106 - 130)  BP: 130/65 (21 Aug 2021 00:14) (105/72 - 146/76)  BP(mean): 86 (20 Aug 2021 23:00) (76 - 91)  RR: 16 (21 Aug 2021 00:14) (11 - 18)  SpO2: 98% (21 Aug 2021 00:14) (97% - 100%)    08-20-21 @ 07:01  -  08-21-21 @ 01:04  --------------------------------------------------------  IN: 140 mL / OUT: 400 mL / NET: -260 mL      PHYSICAL EXAM:  GENERAL: NAD, well-groomed, well-developed  HEENT: NC/AT  CHEST/LUNG: Breathing even, unlabored  HEART: Regular rate and rhythm  ABDOMEN: Soft, nondistended. Incision C/D/I  EXTREMITIES: good distal pulses b/l   NEURO:  No focal deficits                 PEDIATRIC GENERAL SURGERY PROGRESS NOTE    Ulcerative colitis without complications        ARIADNA ONEAL  |  [id number]      Patient is a 20y Male s/p laparoscopic revision proctectomy with ileo anal J pouch with diverting loop ileostomy on 8/20.      S: No acute events overnight. Pt seen on AM rounds and is recovering appropriately. Slightly tachycardic overnight, will continue to monitor.    O:   Vital Signs Last 24 Hrs  T(C): 36.9 (21 Aug 2021 00:14), Max: 37.4 (20 Aug 2021 22:00)  T(F): 98.5 (21 Aug 2021 00:14), Max: 99.3 (20 Aug 2021 22:00)  HR: 118 (21 Aug 2021 00:14) (106 - 130)  BP: 130/65 (21 Aug 2021 00:14) (105/72 - 146/76)  BP(mean): 86 (20 Aug 2021 23:00) (76 - 91)  RR: 16 (21 Aug 2021 00:14) (11 - 18)  SpO2: 98% (21 Aug 2021 00:14) (97% - 100%)    08-20-21 @ 07:01  -  08-21-21 @ 01:04  --------------------------------------------------------  IN: 140 mL / OUT: 400 mL / NET: -260 mL      PHYSICAL EXAM:  GENERAL: NAD, well-groomed, well-developed  HEENT: NC/AT  CHEST/LUNG: Breathing even, unlabored  HEART: Regular rate and rhythm  ABDOMEN: Soft, nondistended. Incision C/D/I. Ostomy bag in place at R side  EXTREMITIES: good distal pulses b/l   NEURO:  No focal deficits

## 2021-08-21 NOTE — PROGRESS NOTE PEDS - ASSESSMENT
Assessment:  ARIADNA ONEAL is a 20y Male s/p laparoscopic revision proctectomy with ileo anal J pouch with diverting loop ileostomy on 8/20.    Pain:  - Pain control  - OOBA  - Diet: NPO  - Monitor I/O  - IBV abx Assessment:  ARIADNA ONEAL is a 20y Male s/p laparoscopic revision proctectomy with ileo anal J pouch with diverting loop ileostomy on 8/20.    Pain:  - Pain control  - OOBA  - Diet: advanced to CLD 8/21  - Monitor I/O  - IBV abx    Pediatric surgery, [id number]

## 2021-08-21 NOTE — PHYSICAL THERAPY INITIAL EVALUATION ADULT - MODALITIES TREATMENT COMMENTS
Overall requiring min assist, verbal cues, extra time. Pt tolerates session well and is motivated to participate. Educated on postioning, sequencing of transitions, safety. Ended session pt seated in bedside chair in NAD, lines intact, MOC present. RN aware. Will continue to follow.

## 2021-08-21 NOTE — PHYSICAL THERAPY INITIAL EVALUATION ADULT - PERTINENT HX OF CURRENT PROBLEM, REHAB EVAL
Patient is a 20y Male s/p laparoscopic revision proctectomy with ileo anal J pouch with diverting loop ileostomy, POD1.

## 2021-08-22 PROCEDURE — 74018 RADEX ABDOMEN 1 VIEW: CPT | Mod: 26

## 2021-08-22 RX ORDER — SODIUM CHLORIDE 9 MG/ML
1000 INJECTION INTRAMUSCULAR; INTRAVENOUS; SUBCUTANEOUS ONCE
Refills: 0 | Status: COMPLETED | OUTPATIENT
Start: 2021-08-22 | End: 2021-08-22

## 2021-08-22 RX ADMIN — Medication 30 MILLIGRAM(S): at 17:15

## 2021-08-22 RX ADMIN — ONDANSETRON 8 MILLIGRAM(S): 8 TABLET, FILM COATED ORAL at 05:52

## 2021-08-22 RX ADMIN — Medication 30 MILLIGRAM(S): at 12:00

## 2021-08-22 RX ADMIN — PIPERACILLIN AND TAZOBACTAM 100 MILLIGRAM(S): 4; .5 INJECTION, POWDER, LYOPHILIZED, FOR SOLUTION INTRAVENOUS at 06:52

## 2021-08-22 RX ADMIN — HYDROMORPHONE HYDROCHLORIDE 30 MILLILITER(S): 2 INJECTION INTRAMUSCULAR; INTRAVENOUS; SUBCUTANEOUS at 08:11

## 2021-08-22 RX ADMIN — Medication 30 MILLIGRAM(S): at 06:05

## 2021-08-22 RX ADMIN — Medication 30 MILLIGRAM(S): at 00:42

## 2021-08-22 RX ADMIN — HEPARIN SODIUM 5000 UNIT(S): 5000 INJECTION INTRAVENOUS; SUBCUTANEOUS at 09:43

## 2021-08-22 RX ADMIN — HEPARIN SODIUM 5000 UNIT(S): 5000 INJECTION INTRAVENOUS; SUBCUTANEOUS at 17:15

## 2021-08-22 RX ADMIN — Medication 30 MILLIGRAM(S): at 23:57

## 2021-08-22 RX ADMIN — Medication 30 MILLIGRAM(S): at 12:51

## 2021-08-22 RX ADMIN — DEXTROSE MONOHYDRATE, SODIUM CHLORIDE, AND POTASSIUM CHLORIDE 140 MILLILITER(S): 50; .745; 4.5 INJECTION, SOLUTION INTRAVENOUS at 08:10

## 2021-08-22 RX ADMIN — HYDROMORPHONE HYDROCHLORIDE 30 MILLILITER(S): 2 INJECTION INTRAMUSCULAR; INTRAVENOUS; SUBCUTANEOUS at 19:12

## 2021-08-22 RX ADMIN — HEPARIN SODIUM 5000 UNIT(S): 5000 INJECTION INTRAVENOUS; SUBCUTANEOUS at 01:33

## 2021-08-22 RX ADMIN — SODIUM CHLORIDE 1000 MILLILITER(S): 9 INJECTION INTRAMUSCULAR; INTRAVENOUS; SUBCUTANEOUS at 16:00

## 2021-08-22 RX ADMIN — PIPERACILLIN AND TAZOBACTAM 100 MILLIGRAM(S): 4; .5 INJECTION, POWDER, LYOPHILIZED, FOR SOLUTION INTRAVENOUS at 01:20

## 2021-08-22 RX ADMIN — DEXTROSE MONOHYDRATE, SODIUM CHLORIDE, AND POTASSIUM CHLORIDE 140 MILLILITER(S): 50; .745; 4.5 INJECTION, SOLUTION INTRAVENOUS at 19:11

## 2021-08-22 NOTE — PROGRESS NOTE PEDS - SUBJECTIVE AND OBJECTIVE BOX
Anesthesia Pain Management Service    SUBJECTIVE: Patient is doing well with IV PCA and no significant problems reported.    Pain Scale Score	At rest: ___ 	With Activity: ___ 	[X ] Refer to charted pain scores    THERAPY:    [ ] IV PCA Morphine		[ ] 5 mg/mL	[ ] 1 mg/mL  [X ] IV PCA Hydromorphone	[ ] 5 mg/mL	[X ] 1 mg/mL  [ ] IV PCA Fentanyl		[ ] 50 micrograms/mL    Demand dose __0.2_ lockout __6_ (minutes) Continuous Rate _0__ Total: _0.6__  mg used (in past 24 hours)      MEDICATIONS  (STANDING):  dextrose 5% + sodium chloride 0.9% with potassium chloride 20 mEq/L. - Pediatric 1000 milliLiter(s) (140 mL/Hr) IV Continuous <Continuous>  heparin SubCutaneous Injection - Peds 5000 Unit(s) SubCutaneous every 8 hours  HYDROmorphone PCA (1 mG/mL) - Peds 30 milliLiter(s) PCA Continuous PCA Continuous  ketorolac IV Push - Peds. 30 milliGRAM(s) IV Push every 6 hours  midazolam   Oral Liquid - Peds 20 milliGRAM(s) Oral once    MEDICATIONS  (PRN):  dexAMETHasone IV Intermittent - Pediatric 4 milliGRAM(s) IV Intermittent every 6 hours PRN Nausea, IF ondansetron is ineffective after 30 - 60 minutes  naloxone  IV Push - Peds 0.1 milliGRAM(s) IV Push every 3 minutes PRN For ANY of the following changes in patient status A. RR less than 10 breaths/min, B. Oxygen saturation less than 90%, C. Sedation score of 6  ondansetron IV Intermittent - Peds 4 milliGRAM(s) IV Intermittent every 8 hours PRN Nausea      OBJECTIVE:  Patient is NPO and sitting up in bed.     Sedation Score:	[ X] Alert	 [ ] Drowsy 	[ ] Arousable	[ ] Asleep	[ ] Unresponsive    Side Effects:	[X ] None	[ ] Nausea	[ ] Vomiting	[ ] Pruritus  		[ ] Other:    Vital Signs Last 24 Hrs  T(C): 36.7 (22 Aug 2021 11:11), Max: 37.6 (22 Aug 2021 06:25)  T(F): 98.1 (22 Aug 2021 11:11), Max: 99.7 (22 Aug 2021 06:25)  HR: 109 (22 Aug 2021 11:11) (95 - 112)  BP: 156/89 (22 Aug 2021 11:11) (133/89 - 156/89)  BP(mean): --  RR: 18 (22 Aug 2021 11:11) (18 - 20)  SpO2: 98% (22 Aug 2021 11:11) (95% - 98%)    ASSESSMENT/ PLAN    Therapy to  be:	[ X] Continue   [ ] Discontinued   [ ] Change to prn Analgesics    Documentation and Verification of current medications:   [X] Done	[ ] Not done, not elligible    Comments: Patient is still NPO and will continue with IV Dilaudid PCA.  Recommend non-opioid adjuvant analgesics to be used when possible and when allowed by primary surgical team.    Progress Note written now but Patient was seen earlier.

## 2021-08-22 NOTE — PROGRESS NOTE PEDS - ASSESSMENT
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    Pain:  - Pain control, c/w PCA placed 8/21  - OOBA  - Diet: NPO, continue to monitor emesis  - Monitor I/O  - IV abx    Pediatric surgery, [id number] ARIADNA ONEAL is a 20y Male s/p laparoscopic revision proctectomy with ileo anal J pouch with diverting loop ileostomy on 8/20.    Pain:  - Pain control, c/w PCA placed 8/21  - f/u AXR  - d/c gonzalez at midnight  - OOBA  - Diet: NPO, continue to monitor emesis  - Monitor I/O  - IV abx    Pediatric surgery, [id number]

## 2021-08-22 NOTE — PROGRESS NOTE PEDS - SUBJECTIVE AND OBJECTIVE BOX
SURGERY DAILY PROGRESS NOTE:       SUBJECTIVE/ROS: During day 8/21, patient complained of abdominal pain and was evaluated by pain service - PCA was placed. Tolerated CLD well during day. Overnight, patient had one episode clear emesis (~50 cc) & larger episode bilious emesis. Was made NPO.     OBJECTIVE:    Vital Signs Last 24 Hrs  T(C): 37.3 (21 Aug 2021 18:00), Max: 37.8 (21 Aug 2021 05:55)  T(F): 99.1 (21 Aug 2021 18:00), Max: 100 (21 Aug 2021 05:55)  HR: 110 (21 Aug 2021 18:00) (103 - 120)  BP: 138/71 (21 Aug 2021 18:00) (128/70 - 138/71)  BP(mean): --  RR: 20 (21 Aug 2021 18:00) (18 - 20)  SpO2: 98% (21 Aug 2021 18:00) (96% - 98%)    I&O's Detail    20 Aug 2021 07:01  -  21 Aug 2021 07:00  --------------------------------------------------------  IN:    dextrose 5% + sodium chloride 0.9% + potassium chloride 20 mEq/L - Pediatric: 980 mL  Total IN: 980 mL    OUT:    Bulb (mL): 190 mL    Indwelling Catheter - Urethral (mL): 940 mL  Total OUT: 1130 mL    Total NET: -150 mL      21 Aug 2021 07:01  -  22 Aug 2021 02:20  --------------------------------------------------------  IN:    dextrose 5% + sodium chloride 0.9% + potassium chloride 20 mEq/L - Pediatric: 1540 mL    Oral Fluid: 540 mL  Total IN: 2080 mL    OUT:    Bulb (mL): 25 mL    Ileostomy (mL): 100 mL    Indwelling Catheter - Urethral (mL): 1000 mL  Total OUT: 1125 mL    Total NET: 955 mL    PHYSICAL EXAM:  GENERAL: NAD, well-groomed, well-developed  HEENT: NC/AT  CHEST/LUNG: Breathing even, unlabored  HEART: Regular rate and rhythm  ABDOMEN: Soft, nondistended. Incision C/D/I. Ostomy bag in place at R side w/ feces present. Normal incisional tenderness. RADHA with serosanguinous output  EXTREMITIES: good distal pulses b/l   NEURO:  No focal deficits                               SURGERY DAILY PROGRESS NOTE:       SUBJECTIVE/ROS: During day 8/21, patient complained of abdominal pain and was evaluated by pain service - PCA was placed. Tolerated CLD well during day. Overnight, patient had one episode clear emesis (~50 cc) & larger episode bilious emesis. Was made NPO. Also with blood-tinged urine in Pak bag overnight.    OBJECTIVE:    Vital Signs Last 24 Hrs  T(C): 37.3 (21 Aug 2021 18:00), Max: 37.8 (21 Aug 2021 05:55)  T(F): 99.1 (21 Aug 2021 18:00), Max: 100 (21 Aug 2021 05:55)  HR: 110 (21 Aug 2021 18:00) (103 - 120)  BP: 138/71 (21 Aug 2021 18:00) (128/70 - 138/71)  BP(mean): --  RR: 20 (21 Aug 2021 18:00) (18 - 20)  SpO2: 98% (21 Aug 2021 18:00) (96% - 98%)    I&O's Detail    20 Aug 2021 07:01  -  21 Aug 2021 07:00  --------------------------------------------------------  IN:    dextrose 5% + sodium chloride 0.9% + potassium chloride 20 mEq/L - Pediatric: 980 mL  Total IN: 980 mL    OUT:    Bulb (mL): 190 mL    Indwelling Catheter - Urethral (mL): 940 mL  Total OUT: 1130 mL    Total NET: -150 mL      21 Aug 2021 07:01  -  22 Aug 2021 02:20  --------------------------------------------------------  IN:    dextrose 5% + sodium chloride 0.9% + potassium chloride 20 mEq/L - Pediatric: 1540 mL    Oral Fluid: 540 mL  Total IN: 2080 mL    OUT:    Bulb (mL): 25 mL    Ileostomy (mL): 100 mL    Indwelling Catheter - Urethral (mL): 1000 mL  Total OUT: 1125 mL    Total NET: 955 mL    PHYSICAL EXAM:  GENERAL: NAD, well-groomed, well-developed  HEENT: NC/AT  CHEST/LUNG: Breathing even, unlabored  HEART: Regular rate and rhythm  ABDOMEN: Soft, nondistended. Incision C/D/I. Ostomy bag in place at R side w/ feces present. Normal incisional tenderness. RADHA with serosanguinous output  EXTREMITIES: good distal pulses b/l   NEURO:  No focal deficits                               SURGERY DAILY PROGRESS NOTE:       SUBJECTIVE/ROS: During day 8/21, patient complained of abdominal pain and was evaluated by pain service - PCA was placed. Tolerated CLD well during day. Overnight, patient had one episode clear emesis (~50 cc) & larger episode bilious emesis. Was made NPO. Also with blood-tinged urine in Pak bag overnight.    OBJECTIVE:    Vital Signs Last 24 Hrs  T(C): 37.6 (22 Aug 2021 06:25), Max: 37.6 (22 Aug 2021 06:25)  T(F): 99.7 (22 Aug 2021 06:25), Max: 99.7 (22 Aug 2021 06:25)  HR: 108 (22 Aug 2021 06:25) (95 - 112)  BP: 143/84 (22 Aug 2021 06:25) (128/75 - 143/84)  BP(mean): --  RR: 18 (22 Aug 2021 06:25) (18 - 20)  SpO2: 95% (22 Aug 2021 06:25) (95% - 98%)    I&O's Summary    21 Aug 2021 07:01  -  22 Aug 2021 07:00  --------------------------------------------------------  IN: 3760 mL / OUT: 2385 mL / NET: 1375 mL      PHYSICAL EXAM:  GENERAL: NAD, well-groomed, well-developed  HEENT: NC/AT  CHEST/LUNG: Breathing even, unlabored  HEART: Regular rate and rhythm  ABDOMEN: Soft, nondistended. Incision C/D/I. Ostomy bag in place at R side w/ feces present. Normal incisional tenderness. RADHA with serosanguinous output  EXTREMITIES: good distal pulses b/l   NEURO:  No focal deficits

## 2021-08-23 RX ORDER — ACETAMINOPHEN 500 MG
650 TABLET ORAL EVERY 6 HOURS
Refills: 0 | Status: COMPLETED | OUTPATIENT
Start: 2021-08-23 | End: 2021-08-24

## 2021-08-23 RX ADMIN — Medication 30 MILLIGRAM(S): at 11:41

## 2021-08-23 RX ADMIN — HYDROMORPHONE HYDROCHLORIDE 30 MILLILITER(S): 2 INJECTION INTRAMUSCULAR; INTRAVENOUS; SUBCUTANEOUS at 19:39

## 2021-08-23 RX ADMIN — DEXTROSE MONOHYDRATE, SODIUM CHLORIDE, AND POTASSIUM CHLORIDE 140 MILLILITER(S): 50; .745; 4.5 INJECTION, SOLUTION INTRAVENOUS at 09:26

## 2021-08-23 RX ADMIN — Medication 260 MILLIGRAM(S): at 11:25

## 2021-08-23 RX ADMIN — Medication 30 MILLIGRAM(S): at 06:06

## 2021-08-23 RX ADMIN — Medication 30 MILLIGRAM(S): at 17:51

## 2021-08-23 RX ADMIN — Medication 30 MILLIGRAM(S): at 17:50

## 2021-08-23 RX ADMIN — Medication 260 MILLIGRAM(S): at 16:56

## 2021-08-23 RX ADMIN — HEPARIN SODIUM 5000 UNIT(S): 5000 INJECTION INTRAVENOUS; SUBCUTANEOUS at 17:50

## 2021-08-23 RX ADMIN — Medication 30 MILLIGRAM(S): at 12:47

## 2021-08-23 RX ADMIN — HEPARIN SODIUM 5000 UNIT(S): 5000 INJECTION INTRAVENOUS; SUBCUTANEOUS at 11:37

## 2021-08-23 RX ADMIN — DEXTROSE MONOHYDRATE, SODIUM CHLORIDE, AND POTASSIUM CHLORIDE 140 MILLILITER(S): 50; .745; 4.5 INJECTION, SOLUTION INTRAVENOUS at 19:37

## 2021-08-23 RX ADMIN — HYDROMORPHONE HYDROCHLORIDE 30 MILLILITER(S): 2 INJECTION INTRAMUSCULAR; INTRAVENOUS; SUBCUTANEOUS at 07:04

## 2021-08-23 RX ADMIN — Medication 650 MILLIGRAM(S): at 11:38

## 2021-08-23 RX ADMIN — DEXTROSE MONOHYDRATE, SODIUM CHLORIDE, AND POTASSIUM CHLORIDE 140 MILLILITER(S): 50; .745; 4.5 INJECTION, SOLUTION INTRAVENOUS at 07:05

## 2021-08-23 RX ADMIN — HEPARIN SODIUM 5000 UNIT(S): 5000 INJECTION INTRAVENOUS; SUBCUTANEOUS at 01:55

## 2021-08-23 RX ADMIN — Medication 650 MILLIGRAM(S): at 17:50

## 2021-08-23 RX ADMIN — Medication 260 MILLIGRAM(S): at 23:21

## 2021-08-23 NOTE — PROGRESS NOTE PEDS - SUBJECTIVE AND OBJECTIVE BOX
Anesthesia Pain Management Service    SUBJECTIVE: Patient is doing well with IV PCA and no significant problems reported.  Vomiting has resolved, but patient is now coughing phlegm.    Pain Scale Score	At rest: _4/10__ 	With Activity: ___ 	[X ] Refer to charted pain scores    THERAPY:    [ ] IV PCA Morphine		[ ] 5 mg/mL	[ ] 1 mg/mL  [X ] IV PCA Hydromorphone	[ ] 5 mg/mL	[X ] 1 mg/mL  [ ] IV PCA Fentanyl		[ ] 50 micrograms/mL    Demand dose __0.2_ lockout __6_ (minutes) Continuous Rate _0__ Total: __2.4_  mg used (in past 24 hours)      MEDICATIONS  (STANDING):  acetaminophen  IV Intermittent - Peds. 650 milliGRAM(s) IV Intermittent every 6 hours  dextrose 5% + sodium chloride 0.9% with potassium chloride 20 mEq/L. - Pediatric 1000 milliLiter(s) (140 mL/Hr) IV Continuous <Continuous>  heparin SubCutaneous Injection - Peds 5000 Unit(s) SubCutaneous every 8 hours  HYDROmorphone PCA (1 mG/mL) - Peds 30 milliLiter(s) PCA Continuous PCA Continuous  ketorolac IV Push - Peds. 30 milliGRAM(s) IV Push every 6 hours  midazolam   Oral Liquid - Peds 20 milliGRAM(s) Oral once    MEDICATIONS  (PRN):  dexAMETHasone IV Intermittent - Pediatric 4 milliGRAM(s) IV Intermittent every 6 hours PRN Nausea, IF ondansetron is ineffective after 30 - 60 minutes  naloxone  IV Push - Peds 0.1 milliGRAM(s) IV Push every 3 minutes PRN For ANY of the following changes in patient status A. RR less than 10 breaths/min, B. Oxygen saturation less than 90%, C. Sedation score of 6  ondansetron IV Intermittent - Peds 4 milliGRAM(s) IV Intermittent every 8 hours PRN Nausea      OBJECTIVE:  Patient is sitting up in chair, coughing up phlegm.    Sedation Score:	[ X] Alert	 [ ] Drowsy 	[ ] Arousable	[ ] Asleep	[ ] Unresponsive    Side Effects:	[X ] None	[ ] Nausea	[ ] Vomiting	[ ] Pruritus  		[ ] Other:    Vital Signs Last 24 Hrs  T(C): 36.7 (23 Aug 2021 06:25), Max: 37.9 (22 Aug 2021 18:30)  T(F): 98.1 (23 Aug 2021 06:25), Max: 100.2 (22 Aug 2021 18:30)  HR: 97 (23 Aug 2021 06:25) (87 - 109)  BP: 141/76 (23 Aug 2021 06:25) (131/75 - 156/89)  BP(mean): --  RR: 16 (23 Aug 2021 06:25) (16 - 18)  SpO2: 98% (23 Aug 2021 06:25) (96% - 99%)    ASSESSMENT/ PLAN    Therapy to  be:	[ X] Continue   [ ] Discontinued   [ ] Change to prn Analgesics    Documentation and Verification of current medications:   [X] Done	[ ] Not done, not elligible    Comments: Will continue with IV Dilaudid PCA.  Recommend non-opioid adjuvant analgesics to be used when possible and when allowed by primary surgical team.    Progress Note written now but Patient was seen earlier.

## 2021-08-23 NOTE — PROGRESS NOTE PEDS - ASSESSMENT
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    Pain:  - Pain control, c/w PCA placed 8/21  - f/u AXR  - d/c gonzalez at midnight  - OOBA  - Diet: CLD  - Monitor I/O  - IV abx    Pediatric surgery, [id number]

## 2021-08-23 NOTE — PROGRESS NOTE PEDS - SUBJECTIVE AND OBJECTIVE BOX
SURGERY DAILY PROGRESS NOTE:       SUBJECTIVE/ROS: Tolerating CLD. Ambulating. Pain controlled. RADHA SS.    OBJECTIVE:    Vital Signs Last 24 Hrs  T(C): 37.6 (22 Aug 2021 06:25), Max: 37.6 (22 Aug 2021 06:25)  T(F): 99.7 (22 Aug 2021 06:25), Max: 99.7 (22 Aug 2021 06:25)  HR: 108 (22 Aug 2021 06:25) (95 - 112)  BP: 143/84 (22 Aug 2021 06:25) (128/75 - 143/84)  BP(mean): --  RR: 18 (22 Aug 2021 06:25) (18 - 20)  SpO2: 95% (22 Aug 2021 06:25) (95% - 98%)    I&O's Summary    21 Aug 2021 07:01  -  22 Aug 2021 07:00  --------------------------------------------------------  IN: 3760 mL / OUT: 2385 mL / NET: 1375 mL      PHYSICAL EXAM:  GENERAL: NAD, well-groomed, well-developed  HEENT: NC/AT  CHEST/LUNG: Breathing even, unlabored  HEART: Regular rate and rhythm  ABDOMEN: Soft, nondistended. Incision C/D/I. Ostomy bag in place at R side w/ feces present. Normal incisional tenderness. RADHA with serosanguinous output  EXTREMITIES: good distal pulses b/l   NEURO:  No focal deficits

## 2021-08-23 NOTE — PROGRESS NOTE PEDS - SUBJECTIVE AND OBJECTIVE BOX
Anesthesia Pain Management Service    SUBJECTIVE: Pt doing well with IV PCA without problems reported.    Therapy:	  [ X] IV PCA	   [ ] Epidural           [ ] s/p Spinal Opoid              [ ] Postpartum infusion	  [ ] Patient controlled regional anesthesia (PCRA)    [ ] prn Analgesics    Allergies    No Known Allergies    Intolerances      MEDICATIONS  (STANDING):  acetaminophen  IV Intermittent - Peds. 650 milliGRAM(s) IV Intermittent every 6 hours  dextrose 5% + sodium chloride 0.9% with potassium chloride 20 mEq/L. - Pediatric 1000 milliLiter(s) (140 mL/Hr) IV Continuous <Continuous>  heparin SubCutaneous Injection - Peds 5000 Unit(s) SubCutaneous every 8 hours  HYDROmorphone PCA (1 mG/mL) - Peds 30 milliLiter(s) PCA Continuous PCA Continuous  ketorolac IV Push - Peds. 30 milliGRAM(s) IV Push every 6 hours  midazolam   Oral Liquid - Peds 20 milliGRAM(s) Oral once    MEDICATIONS  (PRN):  dexAMETHasone IV Intermittent - Pediatric 4 milliGRAM(s) IV Intermittent every 6 hours PRN Nausea, IF ondansetron is ineffective after 30 - 60 minutes  naloxone  IV Push - Peds 0.1 milliGRAM(s) IV Push every 3 minutes PRN For ANY of the following changes in patient status A. RR less than 10 breaths/min, B. Oxygen saturation less than 90%, C. Sedation score of 6  ondansetron IV Intermittent - Peds 4 milliGRAM(s) IV Intermittent every 8 hours PRN Nausea      OBJECTIVE:   [X] No new signs     [ ] Other:    Side Effects:  [X ] None			[ ] Other:    Assessment of Catheter Site:		[ ] Intact		[ ] Other:    ASSESSMENT/PLAN  [ X] Continue current therapy    [ ] Therapy changed to:    [ ] IV PCA       [ ] Epidural     [ ] prn Analgesics     Comments:

## 2021-08-24 RX ORDER — OXYCODONE HYDROCHLORIDE 5 MG/1
10 TABLET ORAL EVERY 4 HOURS
Refills: 0 | Status: DISCONTINUED | OUTPATIENT
Start: 2021-08-24 | End: 2021-08-26

## 2021-08-24 RX ORDER — OXYCODONE HYDROCHLORIDE 5 MG/1
5 TABLET ORAL EVERY 4 HOURS
Refills: 0 | Status: DISCONTINUED | OUTPATIENT
Start: 2021-08-24 | End: 2021-08-24

## 2021-08-24 RX ORDER — ACETAMINOPHEN 500 MG
650 TABLET ORAL EVERY 6 HOURS
Refills: 0 | Status: COMPLETED | OUTPATIENT
Start: 2021-08-24 | End: 2021-08-26

## 2021-08-24 RX ORDER — SODIUM CHLORIDE 9 MG/ML
300 INJECTION, SOLUTION INTRAVENOUS ONCE
Refills: 0 | Status: COMPLETED | OUTPATIENT
Start: 2021-08-24 | End: 2021-08-24

## 2021-08-24 RX ADMIN — Medication 30 MILLIGRAM(S): at 20:01

## 2021-08-24 RX ADMIN — Medication 30 MILLIGRAM(S): at 09:45

## 2021-08-24 RX ADMIN — Medication 650 MILLIGRAM(S): at 11:41

## 2021-08-24 RX ADMIN — SODIUM CHLORIDE 300 MILLILITER(S): 9 INJECTION, SOLUTION INTRAVENOUS at 18:15

## 2021-08-24 RX ADMIN — HEPARIN SODIUM 5000 UNIT(S): 5000 INJECTION INTRAVENOUS; SUBCUTANEOUS at 10:15

## 2021-08-24 RX ADMIN — DEXTROSE MONOHYDRATE, SODIUM CHLORIDE, AND POTASSIUM CHLORIDE 100 MILLILITER(S): 50; .745; 4.5 INJECTION, SOLUTION INTRAVENOUS at 19:44

## 2021-08-24 RX ADMIN — Medication 650 MILLIGRAM(S): at 17:33

## 2021-08-24 RX ADMIN — ONDANSETRON 8 MILLIGRAM(S): 8 TABLET, FILM COATED ORAL at 02:45

## 2021-08-24 RX ADMIN — DEXTROSE MONOHYDRATE, SODIUM CHLORIDE, AND POTASSIUM CHLORIDE 140 MILLILITER(S): 50; .745; 4.5 INJECTION, SOLUTION INTRAVENOUS at 07:40

## 2021-08-24 RX ADMIN — Medication 650 MILLIGRAM(S): at 17:30

## 2021-08-24 RX ADMIN — Medication 30 MILLIGRAM(S): at 14:30

## 2021-08-24 RX ADMIN — Medication 650 MILLIGRAM(S): at 23:26

## 2021-08-24 RX ADMIN — Medication 260 MILLIGRAM(S): at 05:40

## 2021-08-24 RX ADMIN — DEXTROSE MONOHYDRATE, SODIUM CHLORIDE, AND POTASSIUM CHLORIDE 100 MILLILITER(S): 50; .745; 4.5 INJECTION, SOLUTION INTRAVENOUS at 17:34

## 2021-08-24 RX ADMIN — Medication 30 MILLIGRAM(S): at 08:45

## 2021-08-24 RX ADMIN — Medication 30 MILLIGRAM(S): at 14:00

## 2021-08-24 RX ADMIN — HYDROMORPHONE HYDROCHLORIDE 30 MILLILITER(S): 2 INJECTION INTRAMUSCULAR; INTRAVENOUS; SUBCUTANEOUS at 07:40

## 2021-08-24 RX ADMIN — HEPARIN SODIUM 5000 UNIT(S): 5000 INJECTION INTRAVENOUS; SUBCUTANEOUS at 17:35

## 2021-08-24 RX ADMIN — Medication 650 MILLIGRAM(S): at 14:30

## 2021-08-24 RX ADMIN — HEPARIN SODIUM 5000 UNIT(S): 5000 INJECTION INTRAVENOUS; SUBCUTANEOUS at 02:10

## 2021-08-24 RX ADMIN — Medication 30 MILLIGRAM(S): at 02:15

## 2021-08-24 NOTE — PROGRESS NOTE PEDS - SUBJECTIVE AND OBJECTIVE BOX
Anesthesia Pain Management Service    SUBJECTIVE: Pt now off IV PCA without problems reported.    Therapy:	  [ X] IV PCA	   [ ] Epidural           [ ] s/p Spinal Opoid              [ ] Postpartum infusion	  [ ] Patient controlled regional anesthesia (PCRA)    [ ] prn Analgesics    Allergies    No Known Allergies    Intolerances      MEDICATIONS  (STANDING):  acetaminophen   Oral Tab/Cap - Peds. 650 milliGRAM(s) Oral every 6 hours  dextrose 5% + sodium chloride 0.9% with potassium chloride 20 mEq/L. - Pediatric 1000 milliLiter(s) (100 mL/Hr) IV Continuous <Continuous>  heparin SubCutaneous Injection - Peds 5000 Unit(s) SubCutaneous every 8 hours  ketorolac IV Push - Peds. 30 milliGRAM(s) IV Push every 6 hours  midazolam   Oral Liquid - Peds 20 milliGRAM(s) Oral once    MEDICATIONS  (PRN):  naloxone  IV Push - Peds 0.1 milliGRAM(s) IV Push every 3 minutes PRN For ANY of the following changes in patient status A. RR less than 10 breaths/min, B. Oxygen saturation less than 90%, C. Sedation score of 6  ondansetron IV Intermittent - Peds 4 milliGRAM(s) IV Intermittent every 8 hours PRN Nausea  oxyCODONE   IR Oral Tab/Cap - Peds 5 milliGRAM(s) Oral every 4 hours PRN Moderate Pain (4 - 6)  oxyCODONE   IR Oral Tab/Cap - Peds 10 milliGRAM(s) Oral every 4 hours PRN Severe Pain (7 - 10)      OBJECTIVE:   [X] No new signs     [ ] Other:    Side Effects:  [X ] None			[ ] Other:    Assessment of Catheter Site:		[ ] Intact		[ ] Other:    ASSESSMENT/PLAN  [ ] Continue current therapy    [X ] Therapy changed to:    [ ] IV PCA       [ ] Epidural     [ X] prn Analgesics     Comments: PRN Oral/IV opioids and/or non-opioid adjuvant analgesics to be used at this point.    Progress Note written now but Patient was seen earlier.

## 2021-08-24 NOTE — PROGRESS NOTE PEDS - SUBJECTIVE AND OBJECTIVE BOX
Anesthesia Pain Management Service    SUBJECTIVE: Patient states his pain is managed well with IV PCA. Patient states he is eating regular food and is tolerating without nausea and vomiting.  Pain Scale Score	At rest: 3/10___ 	With Activity: ___ 	[X ] Refer to charted pain scores    THERAPY:    [ ] IV PCA Morphine		[ ] 5 mg/mL	[ ] 1 mg/mL  [X ] IV PCA Hydromorphone	[ ] 5 mg/mL	[X ] 1 mg/mL  [ ] IV PCA Fentanyl		[ ] 50 micrograms/mL    Demand dose __0.2_ lockout __6_ (minutes) Continuous Rate _0__ Total: ___   mg used (in past 24 hrs)      MEDICATIONS  (STANDING):  acetaminophen   Oral Tab/Cap - Peds. 650 milliGRAM(s) Oral every 6 hours  dextrose 5% + sodium chloride 0.9% with potassium chloride 20 mEq/L. - Pediatric 1000 milliLiter(s) (100 mL/Hr) IV Continuous <Continuous>  heparin SubCutaneous Injection - Peds 5000 Unit(s) SubCutaneous every 8 hours  ketorolac IV Push - Peds. 30 milliGRAM(s) IV Push every 6 hours  midazolam   Oral Liquid - Peds 20 milliGRAM(s) Oral once    MEDICATIONS  (PRN):  naloxone  IV Push - Peds 0.1 milliGRAM(s) IV Push every 3 minutes PRN For ANY of the following changes in patient status A. RR less than 10 breaths/min, B. Oxygen saturation less than 90%, C. Sedation score of 6  ondansetron IV Intermittent - Peds 4 milliGRAM(s) IV Intermittent every 8 hours PRN Nausea  oxyCODONE   IR Oral Tab/Cap - Peds 5 milliGRAM(s) Oral every 4 hours PRN Moderate Pain (4 - 6)  oxyCODONE   IR Oral Tab/Cap - Peds 10 milliGRAM(s) Oral every 4 hours PRN Severe Pain (7 - 10)      OBJECTIVE: Patient sitting up in chair. Mother at bedside.    Sedation Score:	[ X] Alert	[ ] Drowsy 	[ ] Arousable	[ ] Asleep	[ ] Unresponsive    Side Effects:	[X ] None	[ ] Nausea	[ ] Vomiting	[ ] Pruritus  		[ ] Other:    Vital Signs Last 24 Hrs  T(C): 36.9 (24 Aug 2021 05:55), Max: 37 (23 Aug 2021 14:37)  T(F): 98.4 (24 Aug 2021 05:55), Max: 98.6 (23 Aug 2021 14:37)  HR: 86 (24 Aug 2021 05:55) (77 - 103)  BP: 136/93 (24 Aug 2021 05:55) (135/76 - 149/84)  BP(mean): --  RR: 18 (24 Aug 2021 05:55) (16 - 18)  SpO2: 96% (24 Aug 2021 05:55) (96% - 100%)    ASSESSMENT/ PLAN    Therapy to  be:	[ ] Continue   [ X] Discontinued   [X ] Change to prn Analgesics    Documentation and Verification of current medications:   [X] Done	[ ] Not done, not elligible    Comments: Discussed patient with primary team. IV PCA discontinued  PRN Oral/IV opioids and/or Adjuvant non-opioid medication to be ordered at this point.    Progress Note written now but Patient was seen earlier. Anesthesia Pain Management Service    SUBJECTIVE: Patient states his pain is managed well with IV PCA. Patient states he is eating regular food and is tolerating without nausea and vomiting.  Pain Scale Score	At rest: 3/10___ 	With Activity: ___ 	[X ] Refer to charted pain scores    THERAPY:    [ ] IV PCA Morphine		[ ] 5 mg/mL	[ ] 1 mg/mL  [X ] IV PCA Hydromorphone	[ ] 5 mg/mL	[X ] 1 mg/mL  [ ] IV PCA Fentanyl		[ ] 50 micrograms/mL    Demand dose __0.2_ lockout __6_ (minutes) Continuous Rate _0__ Total: __0.2_   mg used (in past 24 hrs)      MEDICATIONS  (STANDING):  acetaminophen   Oral Tab/Cap - Peds. 650 milliGRAM(s) Oral every 6 hours  dextrose 5% + sodium chloride 0.9% with potassium chloride 20 mEq/L. - Pediatric 1000 milliLiter(s) (100 mL/Hr) IV Continuous <Continuous>  heparin SubCutaneous Injection - Peds 5000 Unit(s) SubCutaneous every 8 hours  ketorolac IV Push - Peds. 30 milliGRAM(s) IV Push every 6 hours  midazolam   Oral Liquid - Peds 20 milliGRAM(s) Oral once    MEDICATIONS  (PRN):  naloxone  IV Push - Peds 0.1 milliGRAM(s) IV Push every 3 minutes PRN For ANY of the following changes in patient status A. RR less than 10 breaths/min, B. Oxygen saturation less than 90%, C. Sedation score of 6  ondansetron IV Intermittent - Peds 4 milliGRAM(s) IV Intermittent every 8 hours PRN Nausea  oxyCODONE   IR Oral Tab/Cap - Peds 5 milliGRAM(s) Oral every 4 hours PRN Moderate Pain (4 - 6)  oxyCODONE   IR Oral Tab/Cap - Peds 10 milliGRAM(s) Oral every 4 hours PRN Severe Pain (7 - 10)      OBJECTIVE: Patient sitting up in chair. Mother at bedside.    Sedation Score:	[ X] Alert	[ ] Drowsy 	[ ] Arousable	[ ] Asleep	[ ] Unresponsive    Side Effects:	[X ] None	[ ] Nausea	[ ] Vomiting	[ ] Pruritus  		[ ] Other:    Vital Signs Last 24 Hrs  T(C): 36.9 (24 Aug 2021 05:55), Max: 37 (23 Aug 2021 14:37)  T(F): 98.4 (24 Aug 2021 05:55), Max: 98.6 (23 Aug 2021 14:37)  HR: 86 (24 Aug 2021 05:55) (77 - 103)  BP: 136/93 (24 Aug 2021 05:55) (135/76 - 149/84)  BP(mean): --  RR: 18 (24 Aug 2021 05:55) (16 - 18)  SpO2: 96% (24 Aug 2021 05:55) (96% - 100%)    ASSESSMENT/ PLAN    Therapy to  be:	[ ] Continue   [ X] Discontinued   [X ] Change to prn Analgesics    Documentation and Verification of current medications:   [X] Done	[ ] Not done, not elligible    Comments: Discussed patient with primary team. IV PCA discontinued  PRN Oral/IV opioids and/or Adjuvant non-opioid medication to be ordered at this point.    Progress Note written now but Patient was seen earlier.

## 2021-08-24 NOTE — PROGRESS NOTE PEDS - SUBJECTIVE AND OBJECTIVE BOX
SURGERY DAILY PROGRESS NOTE:       SUBJECTIVE/ROS: Tolerating CLD. Ambulating. Pain controlled. RADHA SS.    OBJECTIVE:    Vital Signs Last 24 Hrs  Vital Signs Last 24 Hrs  T(C): 37 (23 Aug 2021 21:38), Max: 37 (23 Aug 2021 14:37)  T(F): 98.6 (23 Aug 2021 21:38), Max: 98.6 (23 Aug 2021 14:37)  HR: 83 (23 Aug 2021 21:38) (77 - 103)  BP: 149/84 (23 Aug 2021 21:38) (135/76 - 149/84)  BP(mean): --  RR: 16 (23 Aug 2021 21:38) (16 - 18)  SpO2: 97% (23 Aug 2021 21:38) (96% - 100%)    I&O's Detail    22 Aug 2021 07:01  -  23 Aug 2021 07:00  --------------------------------------------------------  IN:    dextrose 5% + sodium chloride 0.9% + potassium chloride 20 mEq/L - Pediatric: 3360 mL    Sodium Chloride 0.9% Bolus - Pediatric: 1000 mL  Total IN: 4360 mL    OUT:    Bulb (mL): 145 mL    Ileostomy (mL): 1000 mL    Indwelling Catheter - Urethral (mL): 575 mL    Voided (mL): 360 mL  Total OUT: 2080 mL    Total NET: 2280 mL      23 Aug 2021 07:01  -  24 Aug 2021 00:59  --------------------------------------------------------  IN:    dextrose 5% + sodium chloride 0.9% + potassium chloride 20 mEq/L - Pediatric: 2380 mL    Oral Fluid: 600 mL  Total IN: 2980 mL    OUT:    Bulb (mL): 60 mL    Ileostomy (mL): 1390 mL    Voided (mL): 1595 mL  Total OUT: 3045 mL    Total NET: -65 mL    PHYSICAL EXAM:  GENERAL: NAD, well-groomed, well-developed  HEENT: NC/AT  CHEST/LUNG: Breathing even, unlabored  HEART: Regular rate and rhythm  ABDOMEN: Soft, nondistended. Incision C/D/I. Ostomy bag in place at R side w/ feces present. Normal incisional tenderness. RADHA with serosanguinous output  EXTREMITIES: good distal pulses b/l   NEURO:  No focal deficits                               SURGERY DAILY PROGRESS NOTE:       SUBJECTIVE/ROS: Advanced to regular diet on 8/23, 8/24 around 3AM vomited about 100cc yellow emesis. Ambulating. Pain controlled. RADHA SS. Still passing gas and stool in ostomy.     OBJECTIVE:    Vital Signs Last 24 Hrs  Vital Signs Last 24 Hrs  T(C): 37 (23 Aug 2021 21:38), Max: 37 (23 Aug 2021 14:37)  T(F): 98.6 (23 Aug 2021 21:38), Max: 98.6 (23 Aug 2021 14:37)  HR: 83 (23 Aug 2021 21:38) (77 - 103)  BP: 149/84 (23 Aug 2021 21:38) (135/76 - 149/84)  BP(mean): --  RR: 16 (23 Aug 2021 21:38) (16 - 18)  SpO2: 97% (23 Aug 2021 21:38) (96% - 100%)    I&O's Detail    22 Aug 2021 07:01  -  23 Aug 2021 07:00  --------------------------------------------------------  IN:    dextrose 5% + sodium chloride 0.9% + potassium chloride 20 mEq/L - Pediatric: 3360 mL    Sodium Chloride 0.9% Bolus - Pediatric: 1000 mL  Total IN: 4360 mL    OUT:    Bulb (mL): 145 mL    Ileostomy (mL): 1000 mL    Indwelling Catheter - Urethral (mL): 575 mL    Voided (mL): 360 mL  Total OUT: 2080 mL    Total NET: 2280 mL      23 Aug 2021 07:01  -  24 Aug 2021 00:59  --------------------------------------------------------  IN:    dextrose 5% + sodium chloride 0.9% + potassium chloride 20 mEq/L - Pediatric: 2380 mL    Oral Fluid: 600 mL  Total IN: 2980 mL    OUT:    Bulb (mL): 60 mL    Ileostomy (mL): 1390 mL    Voided (mL): 1595 mL  Total OUT: 3045 mL    Total NET: -65 mL    PHYSICAL EXAM:  GENERAL: NAD, well-groomed, well-developed  HEENT: NC/AT  CHEST/LUNG: Breathing even, unlabored  HEART: Regular rate and rhythm  ABDOMEN: Soft, nondistended. Incision C/D/I. Ostomy bag in place at R side w/ feces present. Normal incisional tenderness. RADHA with serosanguinous output  EXTREMITIES: good distal pulses b/l   NEURO:  No focal deficits                               SURGERY DAILY PROGRESS NOTE:       SUBJECTIVE/ROS: Advanced to regular diet on 8/23, 8/24 around 3AM vomited about 100cc yellow emesis. Ambulating. Pain controlled. RADHA SS. Still passing gas and stool in ostomy.     OBJECTIVE:    Vital Signs Last 24 Hrs  Vital Signs Last 24 Hrs  T(C): 37 (23 Aug 2021 21:38), Max: 37 (23 Aug 2021 14:37)  T(F): 98.6 (23 Aug 2021 21:38), Max: 98.6 (23 Aug 2021 14:37)  HR: 83 (23 Aug 2021 21:38) (77 - 103)  BP: 149/84 (23 Aug 2021 21:38) (135/76 - 149/84)  BP(mean): --  RR: 16 (23 Aug 2021 21:38) (16 - 18)  SpO2: 97% (23 Aug 2021 21:38) (96% - 100%)    I&O's Detail    22 Aug 2021 07:01  -  23 Aug 2021 07:00  --------------------------------------------------------  IN:    dextrose 5% + sodium chloride 0.9% + potassium chloride 20 mEq/L - Pediatric: 3360 mL    Sodium Chloride 0.9% Bolus - Pediatric: 1000 mL  Total IN: 4360 mL    OUT:    Bulb (mL): 145 mL    Ileostomy (mL): 1000 mL    Indwelling Catheter - Urethral (mL): 575 mL    Voided (mL): 360 mL  Total OUT: 2080 mL    Total NET: 2280 mL      23 Aug 2021 07:01  -  24 Aug 2021 00:59  --------------------------------------------------------  IN:    dextrose 5% + sodium chloride 0.9% + potassium chloride 20 mEq/L - Pediatric: 2380 mL    Oral Fluid: 600 mL  Total IN: 2980 mL    OUT:    Bulb (mL): 60 mL    Ileostomy (mL): 1390 mL    Voided (mL): 1595 mL  Total OUT: 3045 mL    Total NET: -65 mL    PHYSICAL EXAM:  GENERAL: NAD, well-groomed, well-developed  HEENT: NC/AT  CHEST/LUNG: Breathing even, unlabored  HEART: Regular rate and rhythm  ABDOMEN: Soft, nondistended. Incision C/D/I. Ostomy bag in place at R side w/ gas and liquid stool present. Normal incisional tenderness. RADHA with serosanguinous output  EXTREMITIES: good distal pulses b/l   NEURO:  No focal deficits

## 2021-08-24 NOTE — PROGRESS NOTE PEDS - ASSESSMENT
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    Pain:  - Pain control, c/w PCA placed 8/21, d/c PCA today if tolerating reg diet.   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    Pain:  - Pain control, c/w PCA placed 8/21, d/c PCA today if tolerating reg diet.   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    Pain:  - Pain control, c/w PCA placed 8/21, d/c PCA today per pain management   - f/u AXR  - OOBA  - Diet: Will advance to Regular from NPO  - Monitor I/O    Pediatric surgery, [id number]

## 2021-08-25 LAB
ANION GAP SERPL CALC-SCNC: 15 MMOL/L — HIGH (ref 7–14)
BUN SERPL-MCNC: 9 MG/DL — SIGNIFICANT CHANGE UP (ref 7–23)
CALCIUM SERPL-MCNC: 9.7 MG/DL — SIGNIFICANT CHANGE UP (ref 8.4–10.5)
CHLORIDE SERPL-SCNC: 106 MMOL/L — SIGNIFICANT CHANGE UP (ref 98–107)
CO2 SERPL-SCNC: 21 MMOL/L — LOW (ref 22–31)
CREAT SERPL-MCNC: 0.75 MG/DL — SIGNIFICANT CHANGE UP (ref 0.5–1.3)
GLUCOSE SERPL-MCNC: 96 MG/DL — SIGNIFICANT CHANGE UP (ref 70–99)
MAGNESIUM SERPL-MCNC: 1.9 MG/DL — SIGNIFICANT CHANGE UP (ref 1.6–2.6)
PHOSPHATE SERPL-MCNC: 3.9 MG/DL — SIGNIFICANT CHANGE UP (ref 2.5–4.5)
POTASSIUM SERPL-MCNC: 4 MMOL/L — SIGNIFICANT CHANGE UP (ref 3.5–5.3)
POTASSIUM SERPL-SCNC: 4 MMOL/L — SIGNIFICANT CHANGE UP (ref 3.5–5.3)
SODIUM SERPL-SCNC: 142 MMOL/L — SIGNIFICANT CHANGE UP (ref 135–145)

## 2021-08-25 RX ORDER — SODIUM CHLORIDE 9 MG/ML
50 INJECTION, SOLUTION INTRAVENOUS ONCE
Refills: 0 | Status: COMPLETED | OUTPATIENT
Start: 2021-08-25 | End: 2021-08-25

## 2021-08-25 RX ORDER — SODIUM CHLORIDE 9 MG/ML
650 INJECTION, SOLUTION INTRAVENOUS ONCE
Refills: 0 | Status: COMPLETED | OUTPATIENT
Start: 2021-08-25 | End: 2021-08-25

## 2021-08-25 RX ADMIN — DEXTROSE MONOHYDRATE, SODIUM CHLORIDE, AND POTASSIUM CHLORIDE 100 MILLILITER(S): 50; .745; 4.5 INJECTION, SOLUTION INTRAVENOUS at 05:44

## 2021-08-25 RX ADMIN — Medication 30 MILLIGRAM(S): at 10:10

## 2021-08-25 RX ADMIN — Medication 30 MILLIGRAM(S): at 09:00

## 2021-08-25 RX ADMIN — Medication 650 MILLIGRAM(S): at 11:14

## 2021-08-25 RX ADMIN — Medication 650 MILLIGRAM(S): at 12:19

## 2021-08-25 RX ADMIN — Medication 30 MILLIGRAM(S): at 03:05

## 2021-08-25 RX ADMIN — Medication 30 MILLIGRAM(S): at 16:30

## 2021-08-25 RX ADMIN — SODIUM CHLORIDE 650 MILLILITER(S): 9 INJECTION, SOLUTION INTRAVENOUS at 19:52

## 2021-08-25 RX ADMIN — Medication 650 MILLIGRAM(S): at 23:29

## 2021-08-25 RX ADMIN — SODIUM CHLORIDE 50 MILLILITER(S): 9 INJECTION, SOLUTION INTRAVENOUS at 07:45

## 2021-08-25 RX ADMIN — Medication 30 MILLIGRAM(S): at 15:30

## 2021-08-25 RX ADMIN — ONDANSETRON 8 MILLIGRAM(S): 8 TABLET, FILM COATED ORAL at 01:39

## 2021-08-25 RX ADMIN — HEPARIN SODIUM 5000 UNIT(S): 5000 INJECTION INTRAVENOUS; SUBCUTANEOUS at 17:55

## 2021-08-25 RX ADMIN — Medication 650 MILLIGRAM(S): at 17:18

## 2021-08-25 RX ADMIN — HEPARIN SODIUM 5000 UNIT(S): 5000 INJECTION INTRAVENOUS; SUBCUTANEOUS at 01:50

## 2021-08-25 RX ADMIN — Medication 30 MILLIGRAM(S): at 08:58

## 2021-08-25 RX ADMIN — Medication 650 MILLIGRAM(S): at 18:18

## 2021-08-25 RX ADMIN — HEPARIN SODIUM 5000 UNIT(S): 5000 INJECTION INTRAVENOUS; SUBCUTANEOUS at 09:15

## 2021-08-25 RX ADMIN — Medication 650 MILLIGRAM(S): at 05:44

## 2021-08-25 RX ADMIN — Medication 30 MILLIGRAM(S): at 21:02

## 2021-08-25 NOTE — PROGRESS NOTE PEDS - ASSESSMENT
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    Pain:  - Continue with oral pain regimen  - replete stoma output .5/1ccNS  q shift   - f/u AM BMP  - OOBA  - Diet: Continue on a regular diet  - Monitor I/O    Pediatric surgery, [id number]

## 2021-08-25 NOTE — PROGRESS NOTE PEDS - SUBJECTIVE AND OBJECTIVE BOX
SURGERY DAILY PROGRESS NOTE:       SUBJECTIVE/ROS: No Acute events overnight     OBJECTIVE:    Vital Signs Last 24 Hrs  Vital Signs Last 24 Hrs  T(C): 37 (23 Aug 2021 21:38), Max: 37 (23 Aug 2021 14:37)  T(F): 98.6 (23 Aug 2021 21:38), Max: 98.6 (23 Aug 2021 14:37)  HR: 83 (23 Aug 2021 21:38) (77 - 103)  BP: 149/84 (23 Aug 2021 21:38) (135/76 - 149/84)  BP(mean): --  RR: 16 (23 Aug 2021 21:38) (16 - 18)  SpO2: 97% (23 Aug 2021 21:38) (96% - 100%)    I&O's Detail    22 Aug 2021 07:01  -  23 Aug 2021 07:00  --------------------------------------------------------  IN:    dextrose 5% + sodium chloride 0.9% + potassium chloride 20 mEq/L - Pediatric: 3360 mL    Sodium Chloride 0.9% Bolus - Pediatric: 1000 mL  Total IN: 4360 mL    OUT:    Bulb (mL): 145 mL    Ileostomy (mL): 1000 mL    Indwelling Catheter - Urethral (mL): 575 mL    Voided (mL): 360 mL  Total OUT: 2080 mL    Total NET: 2280 mL      23 Aug 2021 07:01  -  24 Aug 2021 00:59  --------------------------------------------------------  IN:    dextrose 5% + sodium chloride 0.9% + potassium chloride 20 mEq/L - Pediatric: 2380 mL    Oral Fluid: 600 mL  Total IN: 2980 mL    OUT:    Bulb (mL): 60 mL    Ileostomy (mL): 1390 mL    Voided (mL): 1595 mL  Total OUT: 3045 mL    Total NET: -65 mL    PHYSICAL EXAM:  GENERAL: NAD, well-groomed, well-developed  HEENT: NC/AT  CHEST/LUNG: Breathing even, unlabored  HEART: Regular rate and rhythm  ABDOMEN: Soft, nondistended. Incision C/D/I. Ostomy bag in place at R side w/ gas and liquid stool present. Normal incisional tenderness. RADHA with serosanguinous output  EXTREMITIES: good distal pulses b/l   NEURO:  No focal deficits                               SURGERY DAILY PROGRESS NOTE:       SUBJECTIVE/ROS: No Acute events overnight. VSS.  Tolerating a regular diet without UOP .75cc/k/h, SOP 16cc/kg, RADHA drain 135 serosang.      OBJECTIVE:    Vital Signs Last 24 Hrs  Vital Signs Last 24 Hrs  T(C): 37 (23 Aug 2021 21:38), Max: 37 (23 Aug 2021 14:37)  T(F): 98.6 (23 Aug 2021 21:38), Max: 98.6 (23 Aug 2021 14:37)  HR: 83 (23 Aug 2021 21:38) (77 - 103)  BP: 149/84 (23 Aug 2021 21:38) (135/76 - 149/84)  BP(mean): --  RR: 16 (23 Aug 2021 21:38) (16 - 18)  SpO2: 97% (23 Aug 2021 21:38) (96% - 100%)    I&O's Detail    22 Aug 2021 07:01  -  23 Aug 2021 07:00  --------------------------------------------------------  IN:    dextrose 5% + sodium chloride 0.9% + potassium chloride 20 mEq/L - Pediatric: 3360 mL    Sodium Chloride 0.9% Bolus - Pediatric: 1000 mL  Total IN: 4360 mL    OUT:    Bulb (mL): 145 mL    Ileostomy (mL): 1000 mL    Indwelling Catheter - Urethral (mL): 575 mL    Voided (mL): 360 mL  Total OUT: 2080 mL    Total NET: 2280 mL      23 Aug 2021 07:01  -  24 Aug 2021 00:59  --------------------------------------------------------  IN:    dextrose 5% + sodium chloride 0.9% + potassium chloride 20 mEq/L - Pediatric: 2380 mL    Oral Fluid: 600 mL  Total IN: 2980 mL    OUT:    Bulb (mL): 60 mL    Ileostomy (mL): 1390 mL    Voided (mL): 1595 mL  Total OUT: 3045 mL    Total NET: -65 mL    PHYSICAL EXAM:  GENERAL: NAD, well-groomed, well-developed  HEENT: NC/AT  CHEST/LUNG: Breathing even, unlabored  HEART: Regular rate and rhythm  ABDOMEN: Soft, nondistended. Incision C/D/I. Ostomy bag in place at R side w/ gas and liquid stool present. Normal incisional tenderness. RADHA with serosanguinous output  EXTREMITIES: good distal pulses b/l   NEURO:  No focal deficits

## 2021-08-26 PROCEDURE — 74018 RADEX ABDOMEN 1 VIEW: CPT | Mod: 26

## 2021-08-26 RX ORDER — SODIUM CHLORIDE 9 MG/ML
70 INJECTION, SOLUTION INTRAVENOUS ONCE
Refills: 0 | Status: DISCONTINUED | OUTPATIENT
Start: 2021-08-26 | End: 2021-08-26

## 2021-08-26 RX ORDER — LOPERAMIDE HCL 2 MG
2 TABLET ORAL THREE TIMES A DAY
Refills: 0 | Status: DISCONTINUED | OUTPATIENT
Start: 2021-08-26 | End: 2021-08-27

## 2021-08-26 RX ORDER — SODIUM CHLORIDE 9 MG/ML
255 INJECTION, SOLUTION INTRAVENOUS ONCE
Refills: 0 | Status: COMPLETED | OUTPATIENT
Start: 2021-08-26 | End: 2021-08-26

## 2021-08-26 RX ORDER — ACETAMINOPHEN 500 MG
650 TABLET ORAL EVERY 6 HOURS
Refills: 0 | Status: DISCONTINUED | OUTPATIENT
Start: 2021-08-26 | End: 2021-09-01

## 2021-08-26 RX ADMIN — Medication 650 MILLIGRAM(S): at 05:28

## 2021-08-26 RX ADMIN — HEPARIN SODIUM 5000 UNIT(S): 5000 INJECTION INTRAVENOUS; SUBCUTANEOUS at 11:44

## 2021-08-26 RX ADMIN — SODIUM CHLORIDE 255 MILLILITER(S): 9 INJECTION, SOLUTION INTRAVENOUS at 08:55

## 2021-08-26 RX ADMIN — ONDANSETRON 8 MILLIGRAM(S): 8 TABLET, FILM COATED ORAL at 03:11

## 2021-08-26 RX ADMIN — HEPARIN SODIUM 5000 UNIT(S): 5000 INJECTION INTRAVENOUS; SUBCUTANEOUS at 02:35

## 2021-08-26 RX ADMIN — HEPARIN SODIUM 5000 UNIT(S): 5000 INJECTION INTRAVENOUS; SUBCUTANEOUS at 20:45

## 2021-08-26 RX ADMIN — Medication 2 MILLIGRAM(S): at 10:40

## 2021-08-26 RX ADMIN — Medication 2 MILLIGRAM(S): at 14:08

## 2021-08-26 RX ADMIN — Medication 2 MILLIGRAM(S): at 18:42

## 2021-08-26 RX ADMIN — ONDANSETRON 8 MILLIGRAM(S): 8 TABLET, FILM COATED ORAL at 13:16

## 2021-08-26 RX ADMIN — Medication 650 MILLIGRAM(S): at 10:04

## 2021-08-26 NOTE — PROGRESS NOTE PEDS - SUBJECTIVE AND OBJECTIVE BOX
SURGERY DAILY PROGRESS NOTE:       SUBJECTIVE/ROS: No Acute events overnight. Pt concerned about ostomy output but otherwise doing well, with appropriate output    OBJECTIVE:    ICU Vital Signs Last 24 Hrs  T(C): 36.7 (25 Aug 2021 21:15), Max: 36.9 (25 Aug 2021 01:52)  T(F): 98.1 (25 Aug 2021 21:15), Max: 98.4 (25 Aug 2021 01:52)  HR: 102 (25 Aug 2021 21:15) (82 - 102)  BP: 94/61 (25 Aug 2021 21:15) (94/61 - 147/80)  BP(mean): --  ABP: --  ABP(mean): --  RR: 20 (25 Aug 2021 21:15) (16 - 20)  SpO2: 98% (25 Aug 2021 21:15) (95% - 99%)      I&O's Summary        PHYSICAL EXAM:  GENERAL: NAD, well-groomed, well-developed  HEENT: NC/AT  CHEST/LUNG: Breathing even, unlabored  HEART: Regular rate and rhythm  ABDOMEN: Soft, nondistended. Incision C/D/I. Ostomy bag in place at R side w/ gas and liquid stool present. Normal incisional tenderness. RADHA with serosanguinous output  EXTREMITIES: good distal pulses b/l   NEURO:  No focal deficits                               SURGERY DAILY PROGRESS NOTE:       SUBJECTIVE/ROS: No Acute events overnight.     OBJECTIVE:    ICU Vital Signs Last 24 Hrs  T(C): 36.7 (25 Aug 2021 21:15), Max: 36.9 (25 Aug 2021 01:52)  T(F): 98.1 (25 Aug 2021 21:15), Max: 98.4 (25 Aug 2021 01:52)  HR: 102 (25 Aug 2021 21:15) (82 - 102)  BP: 94/61 (25 Aug 2021 21:15) (94/61 - 147/80)  BP(mean): --  ABP: --  ABP(mean): --  RR: 20 (25 Aug 2021 21:15) (16 - 20)  SpO2: 98% (25 Aug 2021 21:15) (95% - 99%)      I&O's Summary        PHYSICAL EXAM:  GENERAL: NAD, well-groomed, well-developed  HEENT: NC/AT  CHEST/LUNG: Breathing even, unlabored  HEART: Regular rate and rhythm  ABDOMEN: Soft, nondistended. Incision C/D/I. Ostomy bag in place at R side w/ gas and liquid stool present. Normal incisional tenderness. RADHA with serosanguinous output  EXTREMITIES: good distal pulses b/l   NEURO:  No focal deficits

## 2021-08-26 NOTE — PROGRESS NOTE PEDS - ASSESSMENT
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    Pain:  - Continue with oral pain regimen  - Continue replacements: for every 1cc stoma output, replace with 0.5cc LR once per shift (only if output >500cc for the shift)  - Will start imodium today for high stoma output  - Will D/C RADHA drain  - Encourage OOB/Ambulation   - Diet: Continue on a regular diet  - Monitor I/O    Pediatric surgery, [id number]

## 2021-08-27 LAB
ANION GAP SERPL CALC-SCNC: 17 MMOL/L — HIGH (ref 7–14)
APTT BLD: 29.3 SEC — SIGNIFICANT CHANGE UP (ref 27–36.3)
BUN SERPL-MCNC: 17 MG/DL — SIGNIFICANT CHANGE UP (ref 7–23)
CALCIUM SERPL-MCNC: 10 MG/DL — SIGNIFICANT CHANGE UP (ref 8.4–10.5)
CHLORIDE SERPL-SCNC: 102 MMOL/L — SIGNIFICANT CHANGE UP (ref 98–107)
CO2 SERPL-SCNC: 21 MMOL/L — LOW (ref 22–31)
CREAT SERPL-MCNC: 0.8 MG/DL — SIGNIFICANT CHANGE UP (ref 0.5–1.3)
GLUCOSE SERPL-MCNC: 87 MG/DL — SIGNIFICANT CHANGE UP (ref 70–99)
HCT VFR BLD CALC: 39.8 % — SIGNIFICANT CHANGE UP (ref 39–50)
HGB BLD-MCNC: 13 G/DL — SIGNIFICANT CHANGE UP (ref 13–17)
INR BLD: 1.16 RATIO — SIGNIFICANT CHANGE UP (ref 0.88–1.16)
MAGNESIUM SERPL-MCNC: 2.2 MG/DL — SIGNIFICANT CHANGE UP (ref 1.6–2.6)
MCHC RBC-ENTMCNC: 25.9 PG — LOW (ref 27–34)
MCHC RBC-ENTMCNC: 32.7 GM/DL — SIGNIFICANT CHANGE UP (ref 32–36)
MCV RBC AUTO: 79.4 FL — LOW (ref 80–100)
NRBC # BLD: 0 /100 WBCS — SIGNIFICANT CHANGE UP
NRBC # FLD: 0 K/UL — SIGNIFICANT CHANGE UP
PHOSPHATE SERPL-MCNC: 3.5 MG/DL — SIGNIFICANT CHANGE UP (ref 2.5–4.5)
PLATELET # BLD AUTO: 368 K/UL — SIGNIFICANT CHANGE UP (ref 150–400)
POTASSIUM SERPL-MCNC: 4.5 MMOL/L — SIGNIFICANT CHANGE UP (ref 3.5–5.3)
POTASSIUM SERPL-SCNC: 4.5 MMOL/L — SIGNIFICANT CHANGE UP (ref 3.5–5.3)
PROTHROM AB SERPL-ACNC: 13.2 SEC — SIGNIFICANT CHANGE UP (ref 10.6–13.6)
RBC # BLD: 5.01 M/UL — SIGNIFICANT CHANGE UP (ref 4.2–5.8)
RBC # FLD: 13.8 % — SIGNIFICANT CHANGE UP (ref 10.3–14.5)
SODIUM SERPL-SCNC: 140 MMOL/L — SIGNIFICANT CHANGE UP (ref 135–145)
WBC # BLD: 8.81 K/UL — SIGNIFICANT CHANGE UP (ref 3.8–10.5)
WBC # FLD AUTO: 8.81 K/UL — SIGNIFICANT CHANGE UP (ref 3.8–10.5)

## 2021-08-27 PROCEDURE — 74177 CT ABD & PELVIS W/CONTRAST: CPT | Mod: 26

## 2021-08-27 RX ORDER — DEXTROSE MONOHYDRATE, SODIUM CHLORIDE, AND POTASSIUM CHLORIDE 50; .745; 4.5 G/1000ML; G/1000ML; G/1000ML
1000 INJECTION, SOLUTION INTRAVENOUS
Refills: 0 | Status: DISCONTINUED | OUTPATIENT
Start: 2021-08-27 | End: 2021-08-29

## 2021-08-27 RX ORDER — LOPERAMIDE HCL 2 MG
2 TABLET ORAL DAILY
Refills: 0 | Status: DISCONTINUED | OUTPATIENT
Start: 2021-08-28 | End: 2021-08-28

## 2021-08-27 RX ORDER — ONDANSETRON 8 MG/1
4 TABLET, FILM COATED ORAL ONCE
Refills: 0 | Status: COMPLETED | OUTPATIENT
Start: 2021-08-27 | End: 2021-08-27

## 2021-08-27 RX ADMIN — HEPARIN SODIUM 5000 UNIT(S): 5000 INJECTION INTRAVENOUS; SUBCUTANEOUS at 04:30

## 2021-08-27 RX ADMIN — ONDANSETRON 8 MILLIGRAM(S): 8 TABLET, FILM COATED ORAL at 02:50

## 2021-08-27 RX ADMIN — ONDANSETRON 4 MILLIGRAM(S): 8 TABLET, FILM COATED ORAL at 09:13

## 2021-08-27 RX ADMIN — Medication 2 MILLIGRAM(S): at 11:32

## 2021-08-27 RX ADMIN — DEXTROSE MONOHYDRATE, SODIUM CHLORIDE, AND POTASSIUM CHLORIDE 100 MILLILITER(S): 50; .745; 4.5 INJECTION, SOLUTION INTRAVENOUS at 19:16

## 2021-08-27 NOTE — DIETITIAN INITIAL EVALUATION PEDIATRIC - OTHER INFO
Patient seen for initial dietitian evaluation for length of stay on [facility].    Patient is a 20 year old male s/p laparoscopic revision proctectomy with ileo anal J pouch with diverting loop ileostomy on 8/20. RADHA removed, ileostomy output slowed on imodium. Nausea improved slightly with Zofran.    Spoke with patient and patient's mother at bedside to obtain subjective information. Patient reports he had good appetite up until yesterday. States he only consumed half of a peanut butter and jelly sandwich yesterday. Reports he consumed pancakes yesterday morning, however, presented with an episode of emesis afterwards. Patient confirms no known food allergies. Denies any difficulties chewing/swallowing. Last episode of emesis reported today. Patient is currently NPO. Per mother, patient with more formed bowel movements to ostomy in the setting of imodium.     Height reported at 5'11", usual body weight stated at 230 pounds. Per this admission, height documented at 180.2cm (equivalent to 71 inches), weight documented at 105.4kg (equivalent to 232 pounds). Will request current weight to assess for any recent changes. Patient's mother with questions regarding ostomy education. Provided patient and patient's mother with written and verbal education. No other questions regarding this at this time.     Diet, NPO - Pediatric (08-27-21 @ 08:03) [Active]

## 2021-08-27 NOTE — DIETITIAN INITIAL EVALUATION PEDIATRIC - SOURCE
Electronic medical record, RN, medical team, patient's mother at bedside/patient/family/significant other/other (specify)

## 2021-08-27 NOTE — DIETITIAN INITIAL EVALUATION PEDIATRIC - PERTINENT PMH/PSH
MEDICATIONS  (STANDING):  dextrose 5% + sodium chloride 0.9% with potassium chloride 20 mEq/L. - Pediatric 1000 milliLiter(s) (100 mL/Hr) IV Continuous <Continuous>

## 2021-08-27 NOTE — PROGRESS NOTE PEDS - SUBJECTIVE AND OBJECTIVE BOX
SURGERY DAILY PROGRESS NOTE:       SUBJECTIVE/ROS: RADHA removed, ileostomy output slowed on imodium. AXR with few dilated loops left abdomen. Had 1 episode emesis 200cc yellow with food particles, nausea improved slightly with zofran, denies pain, has been belching, developed cough 8/26.  Afebrile, encouraged OOBAT and ISS.      OBJECTIVE:  Vital Signs Last 24 Hrs  T(C): 36.9 (26 Aug 2021 21:20), Max: 37.7 (26 Aug 2021 20:30)  T(F): 98.4 (26 Aug 2021 21:20), Max: 99.8 (26 Aug 2021 20:30)  HR: 102 (26 Aug 2021 21:20) (81 - 108)  BP: 132/85 (26 Aug 2021 21:20) (132/85 - 143/86)  BP(mean): --  RR: 18 (26 Aug 2021 21:20) (16 - 18)  SpO2: 97% (26 Aug 2021 21:20) (95% - 97%)    I&O's Detail    25 Aug 2021 07:01  -  26 Aug 2021 07:00  --------------------------------------------------------  IN:    dextrose 5% + sodium chloride 0.9% + potassium chloride 20 mEq/L - Pediatric: 700 mL    Lactated Ringers Bolus - Pediatric: 650 mL    Oral Fluid: 2180 mL  Total IN: 3530 mL    OUT:    Bulb (mL): 140 mL    Ileostomy (mL): 2467 mL    Voided (mL): 2320 mL  Total OUT: 4927 mL    Total NET: -1397 mL      26 Aug 2021 07:01  -  27 Aug 2021 00:31  --------------------------------------------------------  IN:    Oral Fluid: 1355 mL  Total IN: 1355 mL    OUT:    Ileostomy (mL): 670 mL    Voided (mL): 2000 mL  Total OUT: 2670 mL    Total NET: -1315 mL        PHYSICAL EXAM:  GENERAL: NAD, well-groomed, well-developed  HEENT: NC/AT  CHEST/LUNG: Breathing even, unlabored  HEART: Regular rate and rhythm  ABDOMEN: Soft, nondistended. Incision C/D/I. Ostomy bag in place at R side w/ gas and liquid stool present. Normal incisional tenderness.   EXTREMITIES: good distal pulses b/l   NEURO:  No focal deficits                               SURGERY DAILY PROGRESS NOTE:       SUBJECTIVE/ROS: RADHA removed, ileostomy output slowed on imodium. AXR with few dilated loops left abdomen. Had 1 episode emesis 200cc yellow with food particles, and a second similar episode at 2:30AM, nausea improved slightly with zofran, denies pain, has been belching, developed cough 8/26.  Afebrile, encouraged OOBAT and ISS.      OBJECTIVE:  Vital Signs Last 24 Hrs  T(C): 36.9 (26 Aug 2021 21:20), Max: 37.7 (26 Aug 2021 20:30)  T(F): 98.4 (26 Aug 2021 21:20), Max: 99.8 (26 Aug 2021 20:30)  HR: 102 (26 Aug 2021 21:20) (81 - 108)  BP: 132/85 (26 Aug 2021 21:20) (132/85 - 143/86)  BP(mean): --  RR: 18 (26 Aug 2021 21:20) (16 - 18)  SpO2: 97% (26 Aug 2021 21:20) (95% - 97%)    I&O's Detail    25 Aug 2021 07:01  -  26 Aug 2021 07:00  --------------------------------------------------------  IN:    dextrose 5% + sodium chloride 0.9% + potassium chloride 20 mEq/L - Pediatric: 700 mL    Lactated Ringers Bolus - Pediatric: 650 mL    Oral Fluid: 2180 mL  Total IN: 3530 mL    OUT:    Bulb (mL): 140 mL    Ileostomy (mL): 2467 mL    Voided (mL): 2320 mL  Total OUT: 4927 mL    Total NET: -1397 mL      26 Aug 2021 07:01  -  27 Aug 2021 00:31  --------------------------------------------------------  IN:    Oral Fluid: 1355 mL  Total IN: 1355 mL    OUT:    Ileostomy (mL): 670 mL    Voided (mL): 2000 mL  Total OUT: 2670 mL    Total NET: -1315 mL        PHYSICAL EXAM:  GENERAL: NAD, well-groomed, well-developed  HEENT: NC/AT  CHEST/LUNG: Breathing even, unlabored  HEART: Regular rate and rhythm  ABDOMEN: Soft, nondistended. Incision C/D/I. Ostomy bag in place at R side w/ gas and liquid stool present. Normal incisional tenderness.   EXTREMITIES: good distal pulses b/l   NEURO:  No focal deficits                               SURGERY DAILY PROGRESS NOTE:       SUBJECTIVE/ROS: RADHA removed, ileostomy output slowed on imodium. AXR with few dilated loops left abdomen. Had 1 episode emesis 200cc yellow with food particles, and a second similar episode at 2:30AM, nausea improved slightly with zofran, denies pain, has been belching, developed cough 8/26.  Afebrile, encouraged OOBAT and ISS.      OBJECTIVE:  Vital Signs Last 24 Hrs  T(C): 36.8 (27 Aug 2021 06:11), Max: 37.7 (26 Aug 2021 20:30)  T(F): 98.2 (27 Aug 2021 06:11), Max: 99.8 (26 Aug 2021 20:30)  HR: 99 (27 Aug 2021 06:11) (89 - 108)  BP: 132/82 (27 Aug 2021 06:11) (132/82 - 143/83)  BP(mean): --  RR: 18 (27 Aug 2021 06:11) (16 - 18)  SpO2: 98% (27 Aug 2021 06:11) (95% - 98%)    I&O's Detail    26 Aug 2021 07:01  -  27 Aug 2021 07:00  --------------------------------------------------------  IN:    Oral Fluid: 1475 mL  Total IN: 1475 mL    OUT:    Ileostomy (mL): 850 mL    Voided (mL): 2000 mL  Total OUT: 2850 mL    Total NET: -1375 mL        PHYSICAL EXAM:  GENERAL: NAD, well-groomed, well-developed  HEENT: NC/AT  CHEST/LUNG: Breathing even, unlabored  HEART: Regular rate and rhythm  ABDOMEN: Soft, nondistended. Incision C/D/I. Ostomy bag in place at R side w/ gas and liquid stool present. Normal incisional tenderness.   EXTREMITIES: good distal pulses b/l   NEURO:  No focal deficits

## 2021-08-27 NOTE — PROGRESS NOTE PEDS - ASSESSMENT
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    Pain:  - Continue with oral pain regimen  - replete stoma output .5/1ccNS q shift   - OOBAT, ISS  - Diet: Continue on a regular diet, self regulate for nausea  - c/w imodium 200mg TID for hi ostomy output  - Monitor vitals  - Monitor I/O    Pediatric surgery, [id number]

## 2021-08-27 NOTE — DIETITIAN INITIAL EVALUATION PEDIATRIC - NS AS NUTRI INTERV ED CONTENT
Per mother's request, provided patient and patient's mother with verbal and written ileostomy nutrition education.

## 2021-08-28 LAB
ALBUMIN SERPL ELPH-MCNC: 4.3 G/DL — SIGNIFICANT CHANGE UP (ref 3.3–5)
ALP SERPL-CCNC: 90 U/L — SIGNIFICANT CHANGE UP (ref 40–120)
ALT FLD-CCNC: 85 U/L — HIGH (ref 4–41)
ANION GAP SERPL CALC-SCNC: 18 MMOL/L — HIGH (ref 7–14)
AST SERPL-CCNC: 31 U/L — SIGNIFICANT CHANGE UP (ref 4–40)
BILIRUB SERPL-MCNC: 0.6 MG/DL — SIGNIFICANT CHANGE UP (ref 0.2–1.2)
BUN SERPL-MCNC: 16 MG/DL — SIGNIFICANT CHANGE UP (ref 7–23)
CALCIUM SERPL-MCNC: 9.9 MG/DL — SIGNIFICANT CHANGE UP (ref 8.4–10.5)
CHLORIDE SERPL-SCNC: 99 MMOL/L — SIGNIFICANT CHANGE UP (ref 98–107)
CO2 SERPL-SCNC: 19 MMOL/L — LOW (ref 22–31)
CREAT SERPL-MCNC: 0.75 MG/DL — SIGNIFICANT CHANGE UP (ref 0.5–1.3)
GLUCOSE SERPL-MCNC: 107 MG/DL — HIGH (ref 70–99)
HCT VFR BLD CALC: 39.6 % — SIGNIFICANT CHANGE UP (ref 39–50)
HGB BLD-MCNC: 13.2 G/DL — SIGNIFICANT CHANGE UP (ref 13–17)
MCHC RBC-ENTMCNC: 25.9 PG — LOW (ref 27–34)
MCHC RBC-ENTMCNC: 33.3 GM/DL — SIGNIFICANT CHANGE UP (ref 32–36)
MCV RBC AUTO: 77.6 FL — LOW (ref 80–100)
NRBC # BLD: 0 /100 WBCS — SIGNIFICANT CHANGE UP
NRBC # FLD: 0 K/UL — SIGNIFICANT CHANGE UP
PLATELET # BLD AUTO: 408 K/UL — HIGH (ref 150–400)
POTASSIUM SERPL-MCNC: 4.6 MMOL/L — SIGNIFICANT CHANGE UP (ref 3.5–5.3)
POTASSIUM SERPL-SCNC: 4.6 MMOL/L — SIGNIFICANT CHANGE UP (ref 3.5–5.3)
PROT SERPL-MCNC: 7.7 G/DL — SIGNIFICANT CHANGE UP (ref 6–8.3)
RBC # BLD: 5.1 M/UL — SIGNIFICANT CHANGE UP (ref 4.2–5.8)
RBC # FLD: 13.8 % — SIGNIFICANT CHANGE UP (ref 10.3–14.5)
SODIUM SERPL-SCNC: 136 MMOL/L — SIGNIFICANT CHANGE UP (ref 135–145)
SURGICAL PATHOLOGY STUDY: SIGNIFICANT CHANGE UP
WBC # BLD: 8.39 K/UL — SIGNIFICANT CHANGE UP (ref 3.8–10.5)
WBC # FLD AUTO: 8.39 K/UL — SIGNIFICANT CHANGE UP (ref 3.8–10.5)

## 2021-08-28 PROCEDURE — 99253 IP/OBS CNSLTJ NEW/EST LOW 45: CPT

## 2021-08-28 RX ORDER — HYALURONIDASE (HUMAN RECOMBINANT) 150 [USP'U]/ML
150 INJECTION, SOLUTION SUBCUTANEOUS ONCE
Refills: 0 | Status: COMPLETED | OUTPATIENT
Start: 2021-08-28 | End: 2021-08-28

## 2021-08-28 RX ORDER — I.V. FAT EMULSION 20 G/100ML
0.46 EMULSION INTRAVENOUS
Qty: 48 | Refills: 0 | Status: DISCONTINUED | OUTPATIENT
Start: 2021-08-28 | End: 2021-08-29

## 2021-08-28 RX ORDER — ELECTROLYTE SOLUTION,INJ
1 VIAL (ML) INTRAVENOUS
Refills: 0 | Status: DISCONTINUED | OUTPATIENT
Start: 2021-08-28 | End: 2021-08-29

## 2021-08-28 RX ADMIN — Medication 1 EACH: at 18:40

## 2021-08-28 RX ADMIN — Medication 1 EACH: at 19:42

## 2021-08-28 RX ADMIN — ONDANSETRON 8 MILLIGRAM(S): 8 TABLET, FILM COATED ORAL at 16:24

## 2021-08-28 RX ADMIN — I.V. FAT EMULSION 10 GM/KG/DAY: 20 EMULSION INTRAVENOUS at 18:27

## 2021-08-28 RX ADMIN — I.V. FAT EMULSION 10 GM/KG/DAY: 20 EMULSION INTRAVENOUS at 19:43

## 2021-08-28 RX ADMIN — HYALURONIDASE (HUMAN RECOMBINANT) 150 UNIT(S): 150 INJECTION, SOLUTION SUBCUTANEOUS at 08:20

## 2021-08-28 RX ADMIN — ONDANSETRON 8 MILLIGRAM(S): 8 TABLET, FILM COATED ORAL at 01:16

## 2021-08-28 NOTE — CONSULT NOTE PEDS - SUBJECTIVE AND OBJECTIVE BOX
PEDIATRIC PARENTERAL NUTRITION TEAM CONSULTATION:    Date and time of request:  2021, 10 am  Referring clinician/team requesting consultation:  Pediatric Surgery    Reason for consultation:  Provision of Parenteral Nutrition	  Chief Complaint:  Feeding Problems    History of Present Illness:  Juan is a 20-year-old male with history of ulcerative colitis s/p laparoscopic revision proctectomy with ileo anal J pouch with diverting loop ileostomy on .  Has been NPO/clears and just advanced to clears again today.  He had 1 episode of vomiting bilious emesis overnight.  Receiving fluids of D5NS with 20 KCl/L. New PIV placed today. Pediatric Surgery requests initiation of PPN for nutritional support.   PMH:  s/p subtotal colectomy [place] [facility] [place] H/O Clostridium difficile infection and VSD (ventricular septal defect) closed.   PAST SURGICAL HISTORY:  History of creation of ostomy 2021  S/P colectomy 2021 subtotal colectomy  S/P colonoscopy & endoscopy .     PMHx:	Previous Hospitalizations / Surgeries:  YES                 Allergies:   None          Food Allergies / Food Intolerances:  None         MEDS: Ondansetron and acetaminophen prn  ROS:	Hx Pneumonia or Asthma:  No    Hx Diabetes:  No    Hx Dysphagia:  No                  Hx Heart Disease:  Yes  Hx Seizure or Developmental Delay:  No   Hx Vomiting:  No                                                  PHYSICAL EXAM:        Wt:    105.4 kg         Ht:     180.2 cm         BMI:  32.5 kg/m2; classified as overweight         Weight as metabolic kkg* (defined as maintenance fluid volume in ml/100ml)    General appearance:  well-nourished, well-developed in no distress;  HEENT:  NC, AT, PER, non-icteric;  Respiratory:  no respiratory distress;  Abdomen: non-distended; appears with central adiposity;  Neuro:  awake and alert;  Extremities: without cyanosis, or edema;  Skin:  no jaundice; no rashes seen                                                                                      LABS:    Na:   136      Cl:	 99        BUN:	16                Glucose:	107	  Triglycerides:  n/a     K:	4.6       CO2:  19  Creatinine:  0.75  	Ca: 9.9        results: Magnesium:  2.2, Phosphorus:  3.5                 ASSESSMENT:    Feeding Problems  Inadequate Enteral Caloric intake;  Ulcerative colitis post surgery;         Juan is a 20-year-old male with history of Ulcerative Colitis. Now s/p laparoscopic revision proctectomy with ileo anal J pouch with diverting loop ileostomy on . NPO/clears for extended period. s/p episode of vomiting bilious emesis overnight.  PPN to be initiated tonight for nutritional support.   PLAN:  PPN ordered for tonight at 120 ml/hr.  PPN will be 8% Dextrose, 1.8% AA.  20% IL will be 10 ml/hr.  PPN will contain 135 mEq/L NaCl, 15 mEq/L NaAcetate, 15mEq/L KCL, 7 mMol/L KPhos, 3 mEq/L Calcium and 4 mEq/L Magnesium.  PPN will also contain zinc, copper and MVI. CMP, Phos, Mg, TG requested to be drawn tomorrow. Peds surgery will decide regarding need for PICC line.    Pediatric Surgery is managing acute fluid and electrolyte changes.        
Acute Pain Service asked to consult for pain not well controlled. Evaluation also done earlier by NP and note read.    HPI:  19 yo male with ulcerative colitis, s[place]otal [place] [facility] [place]/p laparoscopic complete proctectomy with ileal j pouch anal anastomosis possible diverting [place] [facility] [place] Patient now POD 1.       Patient c/o 5-7/10 pain, worse with motion. Reports modest amelioration with IV morphine. Currently on clears.       PAST MEDICAL & SURGICAL HISTORY:  Ulcerative colitis    VSD (ventricular septal defect)  Closed    H/O Clostridium difficile infection    S/P colonoscopy  &amp; endoscopy 2017    History of creation of ostomy  4/2021    S/P colectomy  4/2021 subtotal colectomy        FAMILY HISTORY:  No family history of bleeding disorder    No family history of adverse response to anesthesia        SOCIAL HISTORY:  [ ] Denies Smoking, Alcohol, or Drug Use    Allergies    No Known Allergies    Intolerances        PAIN MEDICATIONS:  acetaminophen  IV Intermittent - Peds. 650 milliGRAM(s) IV Intermittent every 6 hours  ketorolac IV Push - Peds. 30 milliGRAM(s) IV Push every 6 hours  midazolam   Oral Liquid - Peds 20 milliGRAM(s) Oral once  morphine  IV Intermittent - Peds 2 milliGRAM(s) IV Intermittent every 4 hours PRN  morphine  IV Intermittent - Peds 4 milliGRAM(s) IV Intermittent every 4 hours PRN    Heme:  heparin SubCutaneous Injection - Peds 5000 Unit(s) SubCutaneous every 8 hours    Antibiotics:  piperacillin/tazobactam IV Intermittent - Peds 3000 milliGRAM(s) IV Intermittent every 6 hours    Cardiovascular:    GI:    Endocrine:    All Other Medications:  dextrose 5% + sodium chloride 0.9% with potassium chloride 20 mEq/L. - Pediatric 1000 milliLiter(s) IV Continuous <Continuous>      Vital Signs Last 24 Hrs  T(C): 36.5 (21 Aug 2021 14:54), Max: 37.8 (21 Aug 2021 05:55)  T(F): 97.7 (21 Aug 2021 14:54), Max: 100 (21 Aug 2021 05:55)  HR: 103 (21 Aug 2021 14:54) (103 - 120)  BP: 135/78 (21 Aug 2021 14:54) (105/72 - 146/76)  BP(mean): 86 (20 Aug 2021 23:00) (76 - 91)  RR: 18 (21 Aug 2021 14:54) (11 - 18)  SpO2: 98% (21 Aug 2021 14:54) (96% - 100%)    PAIN SCORE:        see chart           Physical Exam: A & O x 3 in some discomfort    LABS:                    Impression/Plan: Pain not adequately relieved with current opioid treatment regimen. Agree with plan to begin Hydromorphone IV PCA along with any non-opioid adjuvant analgesic medication that can be added and reevaluate by Pain Service tomorrow.

## 2021-08-28 NOTE — PROGRESS NOTE PEDS - ASSESSMENT
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    Pain:  - Continue with oral pain regimen  - replete stoma output .5/1ccNS q shift   - OOBAT, ISS  - Diet: Continue on a regular diet, self regulate for nausea  - c/w imodium 200mg TID for hi ostomy output  - Monitor vitals  - Monitor I/O    Pediatric surgery, [id number] ARIADNA ONEAL is a 20y Male s/p laparoscopic revision proctectomy with ileo anal J pouch with diverting loop ileostomy on 8/20.    Pain:  - PRN pain control  - PRN replete stoma output .5/1ccNS q shift   - OOBAT, ISS  - CLD, start PPN today  - Monitor vitals  - Monitor I/O    Pediatric surgery, [id number] ARIADNA ONEAL is a 20y Male s/p laparoscopic revision proctectomy with ileo anal J pouch with diverting loop ileostomy on 8/20.    Pain:  - PRN pain control  - PRN replete stoma output .5/1ccNS q shift   - OOBAT, ISS  - CLD, start PPN today  - Monitor vitals  - Monitor I/O  - Elevate R hand/arm    Pediatric surgery, [id number]

## 2021-08-28 NOTE — PROGRESS NOTE PEDS - SUBJECTIVE AND OBJECTIVE BOX
SURGERY DAILY PROGRESS NOTE:       SUBJECTIVE/ROS: RADHA removed, ileostomy output slowed on imodium. AXR with few dilated loops left abdomen. Had 1 episode emesis 200cc yellow with food particles, and a second similar episode at 2:30AM, nausea improved slightly with zofran, denies pain, has been belching, developed cough 8/26.  Afebrile, encouraged OOBAT and ISS.      OBJECTIVE:  Vital Signs Last 24 Hrs  T(C): 36.8 (27 Aug 2021 06:11), Max: 37.7 (26 Aug 2021 20:30)  T(F): 98.2 (27 Aug 2021 06:11), Max: 99.8 (26 Aug 2021 20:30)  HR: 99 (27 Aug 2021 06:11) (89 - 108)  BP: 132/82 (27 Aug 2021 06:11) (132/82 - 143/83)  BP(mean): --  RR: 18 (27 Aug 2021 06:11) (16 - 18)  SpO2: 98% (27 Aug 2021 06:11) (95% - 98%)    I&O's Detail    26 Aug 2021 07:01  -  27 Aug 2021 07:00  --------------------------------------------------------  IN:    Oral Fluid: 1475 mL  Total IN: 1475 mL    OUT:    Ileostomy (mL): 850 mL    Voided (mL): 2000 mL  Total OUT: 2850 mL    Total NET: -1375 mL        PHYSICAL EXAM:  GENERAL: NAD, well-groomed, well-developed  HEENT: NC/AT  CHEST/LUNG: Breathing even, unlabored  HEART: Regular rate and rhythm  ABDOMEN: Soft, nondistended. Incision C/D/I. Ostomy bag in place at R side w/ gas and liquid stool present. Normal incisional tenderness.   EXTREMITIES: good distal pulses b/l   NEURO:  No focal deficits                               SURGERY DAILY PROGRESS NOTE:       SUBJECTIVE/ROS: RADHA removed, ileostomy output slowed on imodium. 1 episode of vomit ting bilious emesis overnight. IV infiltration on R hand overnight       OBJECTIVE:  Vital Signs Last 24 Hrs  Vital Signs Last 24 Hrs  T(C): 36.9 (28 Aug 2021 06:01), Max: 36.9 (27 Aug 2021 16:48)  T(F): 98.4 (28 Aug 2021 06:01), Max: 98.5 (27 Aug 2021 16:48)  HR: 82 (28 Aug 2021 06:01) (82 - 96)  BP: 144/81 (28 Aug 2021 06:01) (132/81 - 149/88)  BP(mean): --  RR: 18 (28 Aug 2021 06:01) (16 - 18)  SpO2: 98% (28 Aug 2021 06:01) (95% - 99%)    I&O's Summary    27 Aug 2021 07:01  -  28 Aug 2021 07:00  --------------------------------------------------------  IN: 1500 mL / OUT: 160 mL / NET: 1340 mL    28 Aug 2021 07:01  -  28 Aug 2021 10:25  --------------------------------------------------------  IN: 240 mL / OUT: 100 mL / NET: 140 mL          PHYSICAL EXAM:  GENERAL: NAD, well-groomed, well-developed  HEENT: NC/AT  CHEST/LUNG: Breathing even, unlabored  HEART: Regular rate and rhythm  ABDOMEN: Soft, nondistended. Incision C/D/I. Ostomy bag in place at R side w/ gas and liquid stool present. Normal incisional tenderness.   EXTREMITIES: R hand swollen. No paresthesias pulses intact. ROM slightly limited 2/2 swelling  NEURO:  No focal deficits

## 2021-08-29 LAB
ALBUMIN SERPL ELPH-MCNC: 4.3 G/DL — SIGNIFICANT CHANGE UP (ref 3.3–5)
ALP SERPL-CCNC: 80 U/L — SIGNIFICANT CHANGE UP (ref 40–120)
ALT FLD-CCNC: 56 U/L — HIGH (ref 4–41)
ANION GAP SERPL CALC-SCNC: 14 MMOL/L — SIGNIFICANT CHANGE UP (ref 7–14)
AST SERPL-CCNC: 15 U/L — SIGNIFICANT CHANGE UP (ref 4–40)
BILIRUB SERPL-MCNC: 0.3 MG/DL — SIGNIFICANT CHANGE UP (ref 0.2–1.2)
BUN SERPL-MCNC: 12 MG/DL — SIGNIFICANT CHANGE UP (ref 7–23)
CALCIUM SERPL-MCNC: 9.4 MG/DL — SIGNIFICANT CHANGE UP (ref 8.4–10.5)
CHLORIDE SERPL-SCNC: 104 MMOL/L — SIGNIFICANT CHANGE UP (ref 98–107)
CO2 SERPL-SCNC: 21 MMOL/L — LOW (ref 22–31)
CREAT SERPL-MCNC: 0.77 MG/DL — SIGNIFICANT CHANGE UP (ref 0.5–1.3)
GLUCOSE SERPL-MCNC: 117 MG/DL — HIGH (ref 70–99)
MAGNESIUM SERPL-MCNC: 2.2 MG/DL — SIGNIFICANT CHANGE UP (ref 1.6–2.6)
PHOSPHATE SERPL-MCNC: 3.6 MG/DL — SIGNIFICANT CHANGE UP (ref 2.5–4.5)
POTASSIUM SERPL-MCNC: 4.3 MMOL/L — SIGNIFICANT CHANGE UP (ref 3.5–5.3)
POTASSIUM SERPL-SCNC: 4.3 MMOL/L — SIGNIFICANT CHANGE UP (ref 3.5–5.3)
PROT SERPL-MCNC: 7 G/DL — SIGNIFICANT CHANGE UP (ref 6–8.3)
SODIUM SERPL-SCNC: 139 MMOL/L — SIGNIFICANT CHANGE UP (ref 135–145)
TRIGL SERPL-MCNC: 142 MG/DL — SIGNIFICANT CHANGE UP

## 2021-08-29 PROCEDURE — 99232 SBSQ HOSP IP/OBS MODERATE 35: CPT

## 2021-08-29 RX ORDER — ELECTROLYTE SOLUTION,INJ
1 VIAL (ML) INTRAVENOUS
Refills: 0 | Status: DISCONTINUED | OUTPATIENT
Start: 2021-08-29 | End: 2021-08-30

## 2021-08-29 RX ORDER — I.V. FAT EMULSION 20 G/100ML
0.68 EMULSION INTRAVENOUS
Qty: 72 | Refills: 0 | Status: DISCONTINUED | OUTPATIENT
Start: 2021-08-29 | End: 2021-08-30

## 2021-08-29 RX ORDER — PANTOPRAZOLE SODIUM 20 MG/1
40 TABLET, DELAYED RELEASE ORAL DAILY
Refills: 0 | Status: DISCONTINUED | OUTPATIENT
Start: 2021-08-29 | End: 2021-08-29

## 2021-08-29 RX ORDER — PANTOPRAZOLE SODIUM 20 MG/1
40 TABLET, DELAYED RELEASE ORAL DAILY
Refills: 0 | Status: DISCONTINUED | OUTPATIENT
Start: 2021-08-29 | End: 2021-09-01

## 2021-08-29 RX ADMIN — PANTOPRAZOLE SODIUM 200 MILLIGRAM(S): 20 TABLET, DELAYED RELEASE ORAL at 17:25

## 2021-08-29 RX ADMIN — I.V. FAT EMULSION 15 GM/KG/DAY: 20 EMULSION INTRAVENOUS at 18:34

## 2021-08-29 RX ADMIN — Medication 1 EACH: at 19:20

## 2021-08-29 RX ADMIN — I.V. FAT EMULSION 10 GM/KG/DAY: 20 EMULSION INTRAVENOUS at 07:25

## 2021-08-29 RX ADMIN — Medication 1 EACH: at 18:36

## 2021-08-29 RX ADMIN — I.V. FAT EMULSION 15 GM/KG/DAY: 20 EMULSION INTRAVENOUS at 19:20

## 2021-08-29 RX ADMIN — Medication 1 EACH: at 07:24

## 2021-08-29 NOTE — CHART NOTE - NSCHARTNOTEFT_GEN_A_CORE
PEDIATRIC PARENTERAL NUTRITION TEAM PROGRESS NOTE:    Chief Complaint:  Feeding Problems    History of Present Illness:  Juan is a 20-year-old male with history of ulcerative colitis s/p laparoscopic revision proctectomy with ileo anal J pouch with diverting loop ileostomy on .  Has been NPO/clears and just advanced to clears again today.  He had 1 episode of vomiting bilious emesis. New PIV placed yesterday. Pediatric Surgery requested initiation of PPN for nutritional support which was initiated last night.   Patient tolerating liquids and light solids presently.  PIV without irritation as per patient.     PMH:  s/p subtotal colectomy with ostomy with Dr. Liang 2021 at [facility] [place]. H/O Clostridium difficile infection and VSD (ventricular septal defect) closed.     MEDS: Protonix, Ondansetron and acetaminophen prn    PHYSICAL EXAM:        Wt:    105.4 kg        Weight as metabolic kkg* (defined as maintenance fluid volume in ml/100ml)    General appearance:  well-appearing young man in no distress sitting in chair;  HEENT:  NC, AT, PER, non-icteric;  Respiratory:  no respiratory distress  Abdomen:  non-distended (with some adiposity);  Neuro:  awake and alert and conversant;  Extremities:  without cyanosis or edema;  Skin:  no jaundice, no rashes seen;    LABS:    Na:   139      Cl: 104       BUN:   12        Glucose: 117	  Triglycerides:  142     K:	4.3       CO2:  21  Creatinine:  0.77  Ca: 9.4       Magnesium:  2.2, Phosphorus:  3.6                 ASSESSMENT:    Feeding Problems  Inadequate Enteral Caloric intake;  On Parenteral Nutrition        Patient is a Juan is a 20-year-old male with history of Ulcerative Colitis. Now s/p laparoscopic revision proctectomy with ileo anal J pouch with diverting loop ileostomy on . Had been receiving NPO/clears for an extended period s/p episode of vomiting bilious emesis.   Pt started clears and light solids and tolerating. PPN continues for nutritional support.     PLAN:  PPN changes:  Dextrose decreased from 8 to 7%, AA increased from 1.8 to 2%, and 20% IL increased from 10 to 15 ml/hr to provide more calories and protein.  Electrolyte changes: NaAcetate decreased from 15 to 10mEq/L, KCL increased from 15 to 20 mEq/L; other PPN electrolytes unchanged.     Pediatric Surgery is managing acute fluid and electrolyte changes.

## 2021-08-29 NOTE — PROGRESS NOTE PEDS - ASSESSMENT
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    Pain:  - PRN pain control  - PRN replete stoma output .5/1ccNS q shift   - OOBAT, ISS  - CLD/PPN today  - Monitor vitals  - Monitor I/O  - Will consider catheter through stoma site given emesis       Pediatric surgery, [id number] ARIADNA ONEAL is a 20y Male s/p laparoscopic revision proctectomy with ileo anal J pouch with diverting loop ileostomy on 8/20.    Pain:  - PRN pain control  - OOBAT, I/S  - CLD- advance to regular diet as tolerated today/  - continue PPN   - Monitor vitals  - Monitor I/O  - Serial abdominal exams   - add protonix today        Pediatric surgery, [id number] ARIADNA ONEAL is a 20y Male s/p laparoscopic revision proctectomy with ileo anal J pouch with diverting loop ileostomy on 8/20.    Pain:  - PRN pain control  - OOBAT, I/S  - CLD- advance to regular diet as tolerated today/  - continue PPN   - Monitor vitals  - Monitor I/O  - Serial abdominal exams   - add protonix today  - PRN replete stoma output .5/1ccNS q shift      Pediatric surgery, [id number]

## 2021-08-29 NOTE — PROGRESS NOTE PEDS - SUBJECTIVE AND OBJECTIVE BOX
SURGERY DAILY PROGRESS NOTE:       SUBJECTIVE/ROS: 1 episode of vomitting bilious emesis overnight, pt reports increased gas from stoma site    OBJECTIVE:  Vital Signs Last 24 Hrs  Vital Signs Last 24 Hrs  T(C): 36.9 (28 Aug 2021 06:01), Max: 36.9 (27 Aug 2021 16:48)  T(F): 98.4 (28 Aug 2021 06:01), Max: 98.5 (27 Aug 2021 16:48)  HR: 82 (28 Aug 2021 06:01) (82 - 96)  BP: 144/81 (28 Aug 2021 06:01) (132/81 - 149/88)  BP(mean): --  RR: 18 (28 Aug 2021 06:01) (16 - 18)  SpO2: 98% (28 Aug 2021 06:01) (95% - 99%)    I&O's Summary    27 Aug 2021 07:01  -  28 Aug 2021 07:00  --------------------------------------------------------  IN: 1500 mL / OUT: 160 mL / NET: 1340 mL    28 Aug 2021 07:01  -  28 Aug 2021 10:25  --------------------------------------------------------  IN: 240 mL / OUT: 100 mL / NET: 140 mL          PHYSICAL EXAM:  GENERAL: NAD, well-groomed, well-developed  HEENT: NC/AT  CHEST/LUNG: Breathing even, unlabored  HEART: Regular rate and rhythm  ABDOMEN: Soft, nondistended. Incision C/D/I. Ostomy bag in place at R side w/ gas and liquid stool present. Normal incisional tenderness.   EXTREMITIES: R hand swollen. No paresthesias pulses intact. ROM slightly limited 2/2 swelling  NEURO:  No focal deficits                               SURGERY DAILY PROGRESS NOTE:       SUBJECTIVE/ROS: 1 episode of vomiting bilious emesis overnight, pt reports increased gas and output from stoma. Voiding well. Tolerating clear liquids during the day. Afebrile. Pain well controlled.     OBJECTIVE:  Vital Signs Last 24 Hrs  Vital Signs Last 24 Hrs  T(C): 36.9 (28 Aug 2021 06:01), Max: 36.9 (27 Aug 2021 16:48)  T(F): 98.4 (28 Aug 2021 06:01), Max: 98.5 (27 Aug 2021 16:48)  HR: 82 (28 Aug 2021 06:01) (82 - 96)  BP: 144/81 (28 Aug 2021 06:01) (132/81 - 149/88)  BP(mean): --  RR: 18 (28 Aug 2021 06:01) (16 - 18)  SpO2: 98% (28 Aug 2021 06:01) (95% - 99%)    I&O's Summary    27 Aug 2021 07:01  -  28 Aug 2021 07:00  --------------------------------------------------------  IN: 1500 mL / OUT: 160 mL / NET: 1340 mL    28 Aug 2021 07:01  -  28 Aug 2021 10:25  --------------------------------------------------------  IN: 240 mL / OUT: 100 mL / NET: 140 mL      PHYSICAL EXAM:  GENERAL: NAD, well-groomed, well-developed  HEENT: NC/AT  CHEST/LUNG: Breathing even, unlabored  HEART: Regular rate and rhythm  ABDOMEN: Soft, nondistended. Incision C/D/I. Ostomy bag in place at R side w/ gas and liquid stool present. Normal incisional tenderness.   EXTREMITIES: R hand swollen. No paresthesias pulses intact. ROM slightly limited 2/2 swelling  NEURO:  No focal deficits    MEDICATIONS  (STANDING):  dextrose 5% + sodium chloride 0.9% with potassium chloride 20 mEq/L. - Pediatric 1000 milliLiter(s) (120 mL/Hr) IV Continuous <Continuous>  fat emulsion  (Plant Based) 20% Infusion - Pediatric 0.455 Gm/kG/Day (10 mL/Hr) IV Continuous <Continuous>  pantoprazole  IV Intermittent - Peds 40 milliGRAM(s) IV Intermittent daily  Parenteral Nutrition - Pediatric 1 Each (120 mL/Hr) TPN Continuous <Continuous>    MEDICATIONS  (PRN):  acetaminophen   Oral Tab/Cap - Peds. 650 milliGRAM(s) Oral every 6 hours PRN Mild Pain (1 - 3)  ondansetron IV Intermittent - Peds 4 milliGRAM(s) IV Intermittent every 8 hours PRN Nausea      LABS:                        13.2   8.39  )-----------( 408      ( 28 Aug 2021 10:14 )             39.6     08-29    139  |  104  |  12  ----------------------------<  117<H>  4.3   |  21<L>  |  0.77    Ca    9.4      29 Aug 2021 08:11  Phos  3.6     08-29  Mg     2.20     08-29    TPro  7.0  /  Alb  4.3  /  TBili  0.3  /  DBili  x   /  AST  15  /  ALT  56<H>  /  AlkPhos  80  08-29    PT/INR - ( 27 Aug 2021 13:45 )   PT: 13.2 sec;   INR: 1.16 ratio         PTT - ( 27 Aug 2021 13:45 )  PTT:29.3 sec

## 2021-08-30 ENCOUNTER — TRANSCRIPTION ENCOUNTER (OUTPATIENT)
Age: 21
End: 2021-08-30

## 2021-08-30 LAB
ALBUMIN SERPL ELPH-MCNC: 3.9 G/DL — SIGNIFICANT CHANGE UP (ref 3.3–5)
ALP SERPL-CCNC: 83 U/L — SIGNIFICANT CHANGE UP (ref 40–120)
ALT FLD-CCNC: 44 U/L — HIGH (ref 4–41)
ANION GAP SERPL CALC-SCNC: 9 MMOL/L — SIGNIFICANT CHANGE UP (ref 7–14)
AST SERPL-CCNC: 17 U/L — SIGNIFICANT CHANGE UP (ref 4–40)
BILIRUB SERPL-MCNC: 0.3 MG/DL — SIGNIFICANT CHANGE UP (ref 0.2–1.2)
BUN SERPL-MCNC: 10 MG/DL — SIGNIFICANT CHANGE UP (ref 7–23)
CALCIUM SERPL-MCNC: 9.7 MG/DL — SIGNIFICANT CHANGE UP (ref 8.4–10.5)
CHLORIDE SERPL-SCNC: 106 MMOL/L — SIGNIFICANT CHANGE UP (ref 98–107)
CO2 SERPL-SCNC: 21 MMOL/L — LOW (ref 22–31)
CREAT SERPL-MCNC: 0.71 MG/DL — SIGNIFICANT CHANGE UP (ref 0.5–1.3)
GLUCOSE SERPL-MCNC: 115 MG/DL — HIGH (ref 70–99)
MAGNESIUM SERPL-MCNC: 2.2 MG/DL — SIGNIFICANT CHANGE UP (ref 1.6–2.6)
PHOSPHATE SERPL-MCNC: 4.5 MG/DL — SIGNIFICANT CHANGE UP (ref 2.5–4.5)
POTASSIUM SERPL-MCNC: 4.5 MMOL/L — SIGNIFICANT CHANGE UP (ref 3.5–5.3)
POTASSIUM SERPL-SCNC: 4.5 MMOL/L — SIGNIFICANT CHANGE UP (ref 3.5–5.3)
PROT SERPL-MCNC: 7 G/DL — SIGNIFICANT CHANGE UP (ref 6–8.3)
SODIUM SERPL-SCNC: 136 MMOL/L — SIGNIFICANT CHANGE UP (ref 135–145)
TRIGL SERPL-MCNC: 144 MG/DL — SIGNIFICANT CHANGE UP

## 2021-08-30 PROCEDURE — 99232 SBSQ HOSP IP/OBS MODERATE 35: CPT

## 2021-08-30 RX ORDER — TAMSULOSIN HYDROCHLORIDE 0.4 MG/1
0.4 CAPSULE ORAL AT BEDTIME
Refills: 0 | Status: DISCONTINUED | OUTPATIENT
Start: 2021-08-30 | End: 2021-09-01

## 2021-08-30 RX ORDER — ELECTROLYTE SOLUTION,INJ
1 VIAL (ML) INTRAVENOUS
Refills: 0 | Status: DISCONTINUED | OUTPATIENT
Start: 2021-08-30 | End: 2021-08-31

## 2021-08-30 RX ORDER — FLUTICASONE PROPIONATE 50 MCG
1 SPRAY, SUSPENSION NASAL DAILY
Refills: 0 | Status: DISCONTINUED | OUTPATIENT
Start: 2021-08-30 | End: 2021-09-01

## 2021-08-30 RX ORDER — I.V. FAT EMULSION 20 G/100ML
0.32 EMULSION INTRAVENOUS
Qty: 33.6 | Refills: 0 | Status: DISCONTINUED | OUTPATIENT
Start: 2021-08-30 | End: 2021-08-31

## 2021-08-30 RX ORDER — LORATADINE 10 MG/1
10 TABLET ORAL DAILY
Refills: 0 | Status: DISCONTINUED | OUTPATIENT
Start: 2021-08-30 | End: 2021-09-01

## 2021-08-30 RX ADMIN — PANTOPRAZOLE SODIUM 200 MILLIGRAM(S): 20 TABLET, DELAYED RELEASE ORAL at 15:51

## 2021-08-30 RX ADMIN — Medication 1 EACH: at 07:34

## 2021-08-30 RX ADMIN — Medication 1 SPRAY(S): at 11:40

## 2021-08-30 RX ADMIN — I.V. FAT EMULSION 15 GM/KG/DAY: 20 EMULSION INTRAVENOUS at 07:35

## 2021-08-30 RX ADMIN — Medication 1 EACH: at 19:36

## 2021-08-30 RX ADMIN — TAMSULOSIN HYDROCHLORIDE 0.4 MILLIGRAM(S): 0.4 CAPSULE ORAL at 21:09

## 2021-08-30 RX ADMIN — I.V. FAT EMULSION 7 GM/KG/DAY: 20 EMULSION INTRAVENOUS at 19:36

## 2021-08-30 RX ADMIN — LORATADINE 10 MILLIGRAM(S): 10 TABLET ORAL at 11:39

## 2021-08-30 NOTE — PROGRESS NOTE PEDS - REASON FOR ADMISSION
laparoscopic complete proctectomy with ileal j pouch anal anastomosis possible diverting ileostomy for UC
laparoscopic complete proctectomy with ileal j pouch anal anastomosis possible diverting ileostomy for UC

## 2021-08-30 NOTE — DISCHARGE NOTE PROVIDER - NSDCMRMEDTOKEN_GEN_ALL_CORE_FT
acetaminophen 325 mg oral tablet: 2 tab(s) orally every 6 hours, As needed, Mild Pain (1 - 3)  fluticasone 50 mcg/inh nasal spray: 1 spray(s) nasal once a day  loratadine 10 mg oral tablet: 1 tab(s) orally once a day  tamsulosin 0.4 mg oral capsule: 1 cap(s) orally once a day (at bedtime)

## 2021-08-30 NOTE — PROGRESS NOTE PEDS - SUBJECTIVE AND OBJECTIVE BOX
SURGERY DAILY PROGRESS NOTE:     SUBJECTIVE/ROS: 1 episode of vomiting bilious emesis overnight, says that vomiting occurs after coughing and complains of feeling of "phlegm" in chest.  Pt reports increased gas and output from stoma. Voiding well. Tolerating regular diet during the diet, afebrile.      OBJECTIVE:  Vital Signs Last 24 Hrs  T(C): 36.8 (29 Aug 2021 21:20), Max: 37.2 (29 Aug 2021 17:50)  T(F): 98.2 (29 Aug 2021 21:20), Max: 99 (29 Aug 2021 17:50)  HR: 93 (29 Aug 2021 21:20) (78 - 93)  BP: 138/84 (29 Aug 2021 21:20) (123/84 - 138/84)  BP(mean): --  RR: 18 (29 Aug 2021 21:20) (16 - 18)  SpO2: 96% (29 Aug 2021 21:20) (96% - 99%)    I&O's Detail    28 Aug 2021 07:01  -  29 Aug 2021 07:00  --------------------------------------------------------  IN:    dextrose 5% + sodium chloride 0.9% + potassium chloride 20 mEq/L - Pediatric: 1350 mL    Fat Emulsion (Plant Based) 20% Infusion (Peds): 120 mL    Oral Fluid: 540 mL    PPN (Peripheral Parenteral Nutrition): 1440 mL  Total IN: 3450 mL    OUT:    Ileostomy (mL): 500 mL    Voided (mL): 2075 mL  Total OUT: 2575 mL    Total NET: 875 mL      29 Aug 2021 07:01  -  30 Aug 2021 00:32  --------------------------------------------------------  IN:    Fat Emulsion (Plant Based) 20% Infusion (Peds): 120 mL    Fat Emulsion (Plant Based) 20% Infusion (Peds): 50 mL    Oral Fluid: 600 mL    PPN (Peripheral Parenteral Nutrition): 2040 mL  Total IN: 2810 mL    OUT:    Ileostomy (mL): 800 mL    Voided (mL): 1475 mL  Total OUT: 2275 mL    Total NET: 535 mL    PHYSICAL EXAM:  GENERAL: NAD, well-groomed, well-developed  HEENT: NC/AT  CHEST/LUNG: Breathing even, unlabored  HEART: Regular rate and rhythm  ABDOMEN: Soft, nondistended. Incision C/D/I. Ostomy bag in place at R side w/ gas and liquid stool present. Normal incisional tenderness.   NEURO:  No focal deficits    MEDICATIONS  (STANDING):  dextrose 5% + sodium chloride 0.9% with potassium chloride 20 mEq/L. - Pediatric 1000 milliLiter(s) (120 mL/Hr) IV Continuous <Continuous>  fat emulsion  (Plant Based) 20% Infusion - Pediatric 0.455 Gm/kG/Day (10 mL/Hr) IV Continuous <Continuous>  pantoprazole  IV Intermittent - Peds 40 milliGRAM(s) IV Intermittent daily  Parenteral Nutrition - Pediatric 1 Each (120 mL/Hr) TPN Continuous <Continuous>    MEDICATIONS  (PRN):  acetaminophen   Oral Tab/Cap - Peds. 650 milliGRAM(s) Oral every 6 hours PRN Mild Pain (1 - 3)  ondansetron IV Intermittent - Peds 4 milliGRAM(s) IV Intermittent every 8 hours PRN Nausea      LABS:                                     13.2   8.39  )-----------( 408      ( 28 Aug 2021 10:14 )             39.6     08-29    139  |  104  |  12  ----------------------------<  117<H>  4.3   |  21<L>  |  0.77    Ca    9.4      29 Aug 2021 08:11  Phos  3.6     08-29  Mg     2.20     08-29    TPro  7.0  /  Alb  4.3  /  TBili  0.3  /  DBili  x   /  AST  15  /  ALT  56<H>  /  AlkPhos  80  08-29    PT/INR - ( 27 Aug 2021 13:45 )   PT: 13.2 sec;   INR: 1.16 ratio         PTT - ( 27 Aug 2021 13:45 )  PTT:29.3 sec

## 2021-08-30 NOTE — DISCHARGE NOTE PROVIDER - CARE PROVIDER_API CALL
Pedro Liang)  Pediatric Surgery; Surgery  [address] [facility], [address]  [place] [address]  Phone: [phone]  Fax: [phone]  Follow Up Time: 2 weeks    Masood Lucas)  Urology  [address] [facility], [address]  [place] [address]  Phone: [phone]  Fax: [phone]  Follow Up Time: 2 weeks

## 2021-08-30 NOTE — PROGRESS NOTE PEDS - ASSESSMENT
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    Pain:  - PRN pain control  - OOBAT, I/S  - Diet: regular   - continue PPN   - Monitor vitals  - Monitor I/O  - Serial abdominal exams   - protonix  - PRN replete stoma output .5/1ccNS q shift      Pediatric surgery, [id number] ARIADNA ONEAL is a 20y Male s/p laparoscopic revision proctectomy with ileo anal J pouch with diverting loop ileostomy on 8/20.    Pain:  - PRN pain control  - OOBAT, I/S  - Diet: regular   - Claritin for congestion  - continue PPN   - Monitor vitals  - Monitor I/O  - Serial abdominal exams   - protonix  - PRN replete stoma output .5/1ccNS q shift      Pediatric surgery, [id number]

## 2021-08-30 NOTE — DISCHARGE NOTE PROVIDER - CARE PROVIDERS DIRECT ADDRESSES
[email or web address]

## 2021-08-30 NOTE — CHART NOTE - NSCHARTNOTEFT_GEN_A_CORE
S/w urology team re: stone in ureter incidentally found on CT A/P  Since asymptomatic, they recommend 0.4mg Flomax QD x 2 weeks and follow up in 2 weeks w Dr. Lucas

## 2021-08-30 NOTE — CHART NOTE - NSCHARTNOTEFT_GEN_A_CORE
PEDIATRIC PARENTERAL NUTRITION TEAM PROGRESS NOTE    CHIEF COMPLAINT: Feeding problems; on Parenteral Nutrition    HPI: Pt is a 20 year old male with history of ulcerative colitis s/p laparoscopic revision proctectomy with ileo-anal J pouch with diverting loop ileostomy on .  Pt had been NPO/on clears this admission and was initiated on PPN on  for nutritional support.  Diet now advanced to regular.     Interval History: Pt continues on PPN- rate decreased by half this morning per Peds Surgery team to 60mL/hr as pt now taking regular diet.      MEDICATIONS  (STANDING):  fat emulsion  (Plant Based) 20% Infusion - Pediatric 0.319 Gm/kG/Day (7 mL/Hr) IV Continuous <Continuous>  fat emulsion  (Plant Based) 20% Infusion - Pediatric 0.683 Gm/kG/Day (15 mL/Hr) IV Continuous <Continuous>  fluticasone propionate (50 MICROgram(s)/actuation) Nasal Spray - Peds 1 Spray(s) Both Nostrils daily  loratadine  Oral Tab/Cap - Peds 10 milliGRAM(s) Oral daily  pantoprazole  IV Intermittent - Peds 40 milliGRAM(s) IV Intermittent daily  Parenteral Nutrition - Pediatric 1 Each (60 mL/Hr) TPN Continuous <Continuous>  Parenteral Nutrition - Pediatric 1 Each (120 mL/Hr) TPN Continuous <Continuous>  tamsulosin Oral Tab/Cap - Peds 0.4 milliGRAM(s) Oral at bedtime    PHYSICAL EXAM  WEIGHT: 105.4kg ( @ 12:30)   Weight as metabolic k*kg (defined as maintenance fluid volume in ml/100ml)    GENERAL APPEARANCE:  well-nourished, well-developed in no distress;  HEENT:  NC, AT, PER, non-icteric;  RESPIRATORY:  no respiratory distress;  ABDOMEN: non-distended; appears with central adiposity;  NEUROLOGY:  awake and alert;  EXTREMITIES: without cyanosis, or edema;  SKIN:  no jaundice; no rashes seen    LABS      136  |  106  |  10  ----------------------------<  115  4.5   |  21  |  0.71    Ca    9.7      30 Aug 2021 08:21  Phos  4.5     08-30  Mg     2.20     08-30    TPro  7.0  /  Alb  3.9  /  TBili  0.3  /  DBili  x   /  AST  17  /  ALT  44  /  AlkPhos  83      Triglycerides, Serum: 144 mg/dL ( @ 08:21)    ASSESSMENT:  Feeding Problems;  insufficient enteral caloric intake;  On Parenteral Nutrition     Parenteral Intake (if provided at 120mL/hr with lipids at 15mL/hr):  Total kcal/day: 1636  Grams protein/day: 58  Kcal/*kg/day: Amino Acid 7; Glucose 21; Lipid 22; Total 51    Pt initiated on PPN post-operatively while with insufficient enteral caloric intake (NPO/clear liquid diet).  Diet now advanced to regular and Peds Surgery team requests PPN be ordered at half rate (60mL/hr) with hopes to discontinue tomorrow.      PLAN:  Ordered PPN rate decreased from 120 to 60mL/hr and lipid rate decreased from 15 to 7mL/hr. KPhos decreased from 7 to 5mMol/L; other PPN electrolytes unchanged.     Acute fluid and electrolyte changes as per primary management team.

## 2021-08-30 NOTE — DISCHARGE NOTE PROVIDER - PROVIDER TOKENS
PROVIDER:[TOKEN:[[id number]],FOLLOWUP:[2 weeks]],PROVIDER:[TOKEN:[[id number]],FOLLOWUP:[2 weeks]]

## 2021-08-30 NOTE — DISCHARGE NOTE PROVIDER - HOSPITAL COURSE
20 year old male with Hx of ulcerative colitis s/p subtotal colectomy with Ostomy with Dr. Liang 4/2021 at [facility] now presented for laparoscopic complete procetectomy with ileas J pouch anal anastomosis with possible diverting ileostomy on 8/20/2021. Laparoscopic revision proctectomy with ileo anal J pouch with diverting loop ileostomy was performed on 8/20/2021 with no complications. Patient was then admitted to the floor post op for hemodynamic monitoring as well as pain control monitoring. Pain service was consulted for pain not well controlled. Patient was placed on PCA on 8/21 and experienced much better pain control and was discontinued on 8/24. Ostomy was productive and RADHA drain output reduced through course and was pulled on 8/27. Immodium was given to patient for high ostomy output and output reduced. Hospital course was complicated due to bilious emesis that began on the 27th, but resolved on the 30th. Repeat imaging showed a 2mm ureteral stone and was started on flowmax. Emesis resolved on 8/30. Patient has been able to tolerate a regular diet. Ostomy output is stable. Patient is hemodynamically stable and ready for discharge.

## 2021-08-30 NOTE — DISCHARGE NOTE PROVIDER - NSDCCPCAREPLAN_GEN_ALL_CORE_FT
PRINCIPAL DISCHARGE DIAGNOSIS  Diagnosis: Ulcerative colitis  Assessment and Plan of Treatment: BATHING: Please do not submerge wound underwater. You may shower and/or sponge bathe.  ACTIVITY: No heavy lifting anything more than 10-15lbs or straining. Otherwise, you may return to your usual level of physical activity. If you are taking narcotic pain medication (such as Percocet), do NOT drive a car, operate machinery or make important decisions.  DIET: Low fiber diet  NOTIFY YOUR SURGEON IF: You have any bleeding that does not stop, any pus draining from your wound, any fever (over 100.4 F) or chills, persistent nausea/vomiting with inability to tolerate food or liquids, persistent diarrhea, or if your pain is not controlled on your discharge pain medications.  FOLLOW-UP:  1. Please call to make a follow-up appointment within one week of discharge   2. Please follow up with your primary care physician in one week regarding your hospitalization.   3. Please call Urology office to make appointment regarding ureteral stone

## 2021-08-31 RX ORDER — SODIUM CHLORIDE 9 MG/ML
500 INJECTION, SOLUTION INTRAVENOUS ONCE
Refills: 0 | Status: COMPLETED | OUTPATIENT
Start: 2021-08-31 | End: 2021-08-31

## 2021-08-31 RX ADMIN — PANTOPRAZOLE SODIUM 200 MILLIGRAM(S): 20 TABLET, DELAYED RELEASE ORAL at 16:35

## 2021-08-31 RX ADMIN — TAMSULOSIN HYDROCHLORIDE 0.4 MILLIGRAM(S): 0.4 CAPSULE ORAL at 21:02

## 2021-08-31 RX ADMIN — Medication 1 SPRAY(S): at 10:02

## 2021-08-31 RX ADMIN — LORATADINE 10 MILLIGRAM(S): 10 TABLET ORAL at 10:02

## 2021-08-31 RX ADMIN — SODIUM CHLORIDE 1000 MILLILITER(S): 9 INJECTION, SOLUTION INTRAVENOUS at 17:00

## 2021-08-31 NOTE — PROGRESS NOTE PEDS - ASSESSMENT
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    Pain:  - PRN pain control  - OOBAT, I/S  - Diet: regular   - Claritin for congestion  - continue PPN   - Monitor vitals  - Monitor I/O  - Serial abdominal exams   - protonix  - PRN replete stoma output .5/1ccNS q shift      Pediatric surgery, [id number] ARIADNA ONEAL is a 20y Male s/p laparoscopic revision proctectomy with ileo anal J pouch with diverting loop ileostomy on 8/20.    Pain:  - PRN pain control  - OOBAT, I/S  - Diet: regular   - Claritin for congestion  - Flomax for uretal stone with outpatient urology follow up  - continue 1/2 rate PPN   - Monitor vitals  - Monitor I/O  - Serial abdominal exams   - protonix  - PRN replete stoma output .5/1ccNS q shift      Pediatric surgery, [id number] ARIADNA ONEAL is a 20y Male s/p laparoscopic revision proctectomy with ileo anal J pouch with diverting loop ileostomy on 8/20.    Pain:  - PRN pain control  - OOBAT, I/S  - Diet: regular   - Claritin for congestion  - Flomax for uretal stone with outpatient urology follow up  - D/c PPN today  - Monitor vitals  - Monitor I/O  - Serial abdominal exams   - protonix  - PRN replete stoma output .5/1ccNS q shift      Pediatric surgery, [id number]

## 2021-08-31 NOTE — PROGRESS NOTE PEDS - SUBJECTIVE AND OBJECTIVE BOX
SURGERY DAILY PROGRESS NOTE:     SUBJECTIVE/ROS: 1 episode of vomiting bilious emesis overnight, says that vomiting occurs after coughing and complains of feeling of "phlegm" in chest.  Pt reports increased gas and output from stoma. Voiding well. Tolerating regular diet during the diet, afebrile.      OBJECTIVE:  Vital Signs Last 24 Hrs  T(C): 36.8 (29 Aug 2021 21:20), Max: 37.2 (29 Aug 2021 17:50)  T(F): 98.2 (29 Aug 2021 21:20), Max: 99 (29 Aug 2021 17:50)  HR: 93 (29 Aug 2021 21:20) (78 - 93)  BP: 138/84 (29 Aug 2021 21:20) (123/84 - 138/84)  BP(mean): --  RR: 18 (29 Aug 2021 21:20) (16 - 18)  SpO2: 96% (29 Aug 2021 21:20) (96% - 99%)    I&O's Detail    28 Aug 2021 07:01  -  29 Aug 2021 07:00  --------------------------------------------------------  IN:    dextrose 5% + sodium chloride 0.9% + potassium chloride 20 mEq/L - Pediatric: 1350 mL    Fat Emulsion (Plant Based) 20% Infusion (Peds): 120 mL    Oral Fluid: 540 mL    PPN (Peripheral Parenteral Nutrition): 1440 mL  Total IN: 3450 mL    OUT:    Ileostomy (mL): 500 mL    Voided (mL): 2075 mL  Total OUT: 2575 mL    Total NET: 875 mL      29 Aug 2021 07:01  -  30 Aug 2021 00:32  --------------------------------------------------------  IN:    Fat Emulsion (Plant Based) 20% Infusion (Peds): 120 mL    Fat Emulsion (Plant Based) 20% Infusion (Peds): 50 mL    Oral Fluid: 600 mL    PPN (Peripheral Parenteral Nutrition): 2040 mL  Total IN: 2810 mL    OUT:    Ileostomy (mL): 800 mL    Voided (mL): 1475 mL  Total OUT: 2275 mL    Total NET: 535 mL    PHYSICAL EXAM:  GENERAL: NAD, well-groomed, well-developed  HEENT: NC/AT  CHEST/LUNG: Breathing even, unlabored  HEART: Regular rate and rhythm  ABDOMEN: Soft, nondistended. Incision C/D/I. Ostomy bag in place at R side w/ gas and liquid stool present. Normal incisional tenderness.   NEURO:  No focal deficits    MEDICATIONS  (STANDING):  dextrose 5% + sodium chloride 0.9% with potassium chloride 20 mEq/L. - Pediatric 1000 milliLiter(s) (120 mL/Hr) IV Continuous <Continuous>  fat emulsion  (Plant Based) 20% Infusion - Pediatric 0.455 Gm/kG/Day (10 mL/Hr) IV Continuous <Continuous>  pantoprazole  IV Intermittent - Peds 40 milliGRAM(s) IV Intermittent daily  Parenteral Nutrition - Pediatric 1 Each (120 mL/Hr) TPN Continuous <Continuous>    MEDICATIONS  (PRN):  acetaminophen   Oral Tab/Cap - Peds. 650 milliGRAM(s) Oral every 6 hours PRN Mild Pain (1 - 3)  ondansetron IV Intermittent - Peds 4 milliGRAM(s) IV Intermittent every 8 hours PRN Nausea      LABS:                                     13.2   8.39  )-----------( 408      ( 28 Aug 2021 10:14 )             39.6     08-29    139  |  104  |  12  ----------------------------<  117<H>  4.3   |  21<L>  |  0.77    Ca    9.4      29 Aug 2021 08:11  Phos  3.6     08-29  Mg     2.20     08-29    TPro  7.0  /  Alb  4.3  /  TBili  0.3  /  DBili  x   /  AST  15  /  ALT  56<H>  /  AlkPhos  80  08-29    PT/INR - ( 27 Aug 2021 13:45 )   PT: 13.2 sec;   INR: 1.16 ratio         PTT - ( 27 Aug 2021 13:45 )  PTT:29.3 sec                             SURGERY DAILY PROGRESS NOTE:     SUBJECTIVE/ROS: Afebrile, one episode of emesis yesterday.  Started on claritin for phlegm possibly contributing to emesis.  Flomax started for incidental finding of stone in ureter on CT A/P as per urology recs.  Tolerating a diet, continues on 1/2 rate PPN.  Stoma output 7.5cc/kg.    OBJECTIVE:  Vital Signs Last 24 Hrs  T(C): 36.8 (29 Aug 2021 21:20), Max: 37.2 (29 Aug 2021 17:50)  T(F): 98.2 (29 Aug 2021 21:20), Max: 99 (29 Aug 2021 17:50)  HR: 93 (29 Aug 2021 21:20) (78 - 93)  BP: 138/84 (29 Aug 2021 21:20) (123/84 - 138/84)  BP(mean): --  RR: 18 (29 Aug 2021 21:20) (16 - 18)  SpO2: 96% (29 Aug 2021 21:20) (96% - 99%)    I&O's Detail    28 Aug 2021 07:01  -  29 Aug 2021 07:00  --------------------------------------------------------  IN:    dextrose 5% + sodium chloride 0.9% + potassium chloride 20 mEq/L - Pediatric: 1350 mL    Fat Emulsion (Plant Based) 20% Infusion (Peds): 120 mL    Oral Fluid: 540 mL    PPN (Peripheral Parenteral Nutrition): 1440 mL  Total IN: 3450 mL    OUT:    Ileostomy (mL): 500 mL    Voided (mL): 2075 mL  Total OUT: 2575 mL    Total NET: 875 mL      29 Aug 2021 07:01  -  30 Aug 2021 00:32  --------------------------------------------------------  IN:    Fat Emulsion (Plant Based) 20% Infusion (Peds): 120 mL    Fat Emulsion (Plant Based) 20% Infusion (Peds): 50 mL    Oral Fluid: 600 mL    PPN (Peripheral Parenteral Nutrition): 2040 mL  Total IN: 2810 mL    OUT:    Ileostomy (mL): 800 mL    Voided (mL): 1475 mL  Total OUT: 2275 mL    Total NET: 535 mL    PHYSICAL EXAM:  GENERAL: NAD, well-groomed, well-developed  HEENT: NC/AT  CHEST/LUNG: Breathing even, unlabored  HEART: Regular rate and rhythm  ABDOMEN: Soft, nondistended. Incision C/D/I. Ostomy bag in place at R side w/ gas and liquid stool present. Normal incisional tenderness.   NEURO:  No focal deficits    MEDICATIONS  (STANDING):  dextrose 5% + sodium chloride 0.9% with potassium chloride 20 mEq/L. - Pediatric 1000 milliLiter(s) (120 mL/Hr) IV Continuous <Continuous>  fat emulsion  (Plant Based) 20% Infusion - Pediatric 0.455 Gm/kG/Day (10 mL/Hr) IV Continuous <Continuous>  pantoprazole  IV Intermittent - Peds 40 milliGRAM(s) IV Intermittent daily  Parenteral Nutrition - Pediatric 1 Each (120 mL/Hr) TPN Continuous <Continuous>    MEDICATIONS  (PRN):  acetaminophen   Oral Tab/Cap - Peds. 650 milliGRAM(s) Oral every 6 hours PRN Mild Pain (1 - 3)  ondansetron IV Intermittent - Peds 4 milliGRAM(s) IV Intermittent every 8 hours PRN Nausea      LABS:                                     13.2   8.39  )-----------( 408      ( 28 Aug 2021 10:14 )             39.6     08-29    139  |  104  |  12  ----------------------------<  117<H>  4.3   |  21<L>  |  0.77    Ca    9.4      29 Aug 2021 08:11  Phos  3.6     08-29  Mg     2.20     08-29    TPro  7.0  /  Alb  4.3  /  TBili  0.3  /  DBili  x   /  AST  15  /  ALT  56<H>  /  AlkPhos  80  08-29    PT/INR - ( 27 Aug 2021 13:45 )   PT: 13.2 sec;   INR: 1.16 ratio         PTT - ( 27 Aug 2021 13:45 )  PTT:29.3 sec

## 2021-09-01 ENCOUNTER — TRANSCRIPTION ENCOUNTER (OUTPATIENT)
Age: 21
End: 2021-09-01

## 2021-09-01 VITALS
SYSTOLIC BLOOD PRESSURE: 119 MMHG | TEMPERATURE: 98 F | DIASTOLIC BLOOD PRESSURE: 79 MMHG | HEART RATE: 112 BPM | RESPIRATION RATE: 20 BRPM | OXYGEN SATURATION: 96 %

## 2021-09-01 LAB
ANION GAP SERPL CALC-SCNC: 14 MMOL/L — SIGNIFICANT CHANGE UP (ref 7–14)
BUN SERPL-MCNC: 13 MG/DL — SIGNIFICANT CHANGE UP (ref 7–23)
CALCIUM SERPL-MCNC: 10.2 MG/DL — SIGNIFICANT CHANGE UP (ref 8.4–10.5)
CHLORIDE SERPL-SCNC: 97 MMOL/L — LOW (ref 98–107)
CO2 SERPL-SCNC: 25 MMOL/L — SIGNIFICANT CHANGE UP (ref 22–31)
CREAT SERPL-MCNC: 0.88 MG/DL — SIGNIFICANT CHANGE UP (ref 0.5–1.3)
GLUCOSE SERPL-MCNC: 137 MG/DL — HIGH (ref 70–99)
HCT VFR BLD CALC: 41.9 % — SIGNIFICANT CHANGE UP (ref 39–50)
HGB BLD-MCNC: 13.7 G/DL — SIGNIFICANT CHANGE UP (ref 13–17)
MAGNESIUM SERPL-MCNC: 2 MG/DL — SIGNIFICANT CHANGE UP (ref 1.6–2.6)
MCHC RBC-ENTMCNC: 25.9 PG — LOW (ref 27–34)
MCHC RBC-ENTMCNC: 32.7 GM/DL — SIGNIFICANT CHANGE UP (ref 32–36)
MCV RBC AUTO: 79.2 FL — LOW (ref 80–100)
NRBC # BLD: 0 /100 WBCS — SIGNIFICANT CHANGE UP
NRBC # FLD: 0 K/UL — SIGNIFICANT CHANGE UP
PHOSPHATE SERPL-MCNC: 4.1 MG/DL — SIGNIFICANT CHANGE UP (ref 2.5–4.5)
PLATELET # BLD AUTO: 495 K/UL — HIGH (ref 150–400)
POTASSIUM SERPL-MCNC: 4.1 MMOL/L — SIGNIFICANT CHANGE UP (ref 3.5–5.3)
POTASSIUM SERPL-SCNC: 4.1 MMOL/L — SIGNIFICANT CHANGE UP (ref 3.5–5.3)
RBC # BLD: 5.29 M/UL — SIGNIFICANT CHANGE UP (ref 4.2–5.8)
RBC # FLD: 14 % — SIGNIFICANT CHANGE UP (ref 10.3–14.5)
SODIUM SERPL-SCNC: 136 MMOL/L — SIGNIFICANT CHANGE UP (ref 135–145)
WBC # BLD: 10.98 K/UL — HIGH (ref 3.8–10.5)
WBC # FLD AUTO: 10.98 K/UL — HIGH (ref 3.8–10.5)

## 2021-09-01 RX ORDER — FLUTICASONE PROPIONATE 50 MCG
1 SPRAY, SUSPENSION NASAL
Qty: 0 | Refills: 0 | DISCHARGE
Start: 2021-09-01

## 2021-09-01 RX ORDER — PANTOPRAZOLE SODIUM 20 MG/1
1 TABLET, DELAYED RELEASE ORAL
Qty: 30 | Refills: 0
Start: 2021-09-01

## 2021-09-01 RX ORDER — LORATADINE 10 MG/1
1 TABLET ORAL
Qty: 0 | Refills: 0 | DISCHARGE
Start: 2021-09-01

## 2021-09-01 RX ORDER — ACETAMINOPHEN 500 MG
2 TABLET ORAL
Qty: 0 | Refills: 0 | DISCHARGE
Start: 2021-09-01

## 2021-09-01 RX ORDER — TAMSULOSIN HYDROCHLORIDE 0.4 MG/1
1 CAPSULE ORAL
Qty: 0 | Refills: 0 | DISCHARGE
Start: 2021-09-01 | End: 2021-09-13

## 2021-09-01 RX ORDER — TAMSULOSIN HYDROCHLORIDE 0.4 MG/1
1 CAPSULE ORAL
Qty: 0 | Refills: 0 | DISCHARGE
Start: 2021-09-01

## 2021-09-01 RX ORDER — TAMSULOSIN HYDROCHLORIDE 0.4 MG/1
1 CAPSULE ORAL
Qty: 14 | Refills: 0
Start: 2021-09-01 | End: 2021-09-14

## 2021-09-01 RX ADMIN — Medication 1 SPRAY(S): at 10:42

## 2021-09-01 RX ADMIN — LORATADINE 10 MILLIGRAM(S): 10 TABLET ORAL at 10:42

## 2021-09-01 NOTE — DISCHARGE NOTE NURSING/CASE MANAGEMENT/SOCIAL WORK - PATIENT PORTAL LINK FT
You can access the FollowMyHealth Patient Portal offered by [facility] by registering at the following website: [email or web address]. By joining [employer]’s FollowMyHealth portal, you will also be able to view your health information using other applications (apps) compatible with our system.

## 2021-09-01 NOTE — PROGRESS NOTE PEDS - ASSESSMENT
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    Pain:  - PRN pain control  - OOBAT, I/S  - Diet: regular   - Claritin for congestion  - Flomax for uretal stone with outpatient urology follow up  - Monitor vitals  - Monitor I/O  - Serial abdominal exams   - protonix  - PRN replete stoma output .5/1ccNS q shift      Pediatric surgery, [id number] ARIADNA ONEAL is a 20y Male s/p laparoscopic revision proctectomy with ileo anal J pouch with diverting loop ileostomy on 8/20.    Pain:  - PRN pain control  - OOBAT, I/S  - Diet: regular   - Claritin for congestion  - Flomax for uretal stone with outpatient urology follow up  - Monitor vitals  - Monitor I/O  - Serial abdominal exams   - protonix  - PRN replete stoma output .5/1ccNS q shift  - Possible d/c today     Pediatric surgery, [id number]

## 2021-09-01 NOTE — PROGRESS NOTE PEDS - PROVIDER SPECIALTY LIST PEDS
Pain Medicine
Surgery
Pain Medicine
Pain Medicine
Surgery

## 2021-09-01 NOTE — DISCHARGE NOTE NURSING/CASE MANAGEMENT/SOCIAL WORK - NSDCVIVACCINE_GEN_ALL_CORE_FT
Influenza, injectable,quadrivalent, preservative free, pediatric; 25-Oct-2018 15:50; Christy Lux (RN); [employer]; [id number] (Exp. Date: 30-Jun-2019); IntraMuscular; Deltoid Left.; 0.5 milliLiter(s); VIS (VIS Published: 07-Aug-2015, VIS Presented: 25-Oct-2018);   Influenza, injectable,quadrivalent, preservative free, pediatric; 20-Mar-2020 10:05; Lina Hernadez); [employer]; [id number] (Exp. Date: 30-Jun-2020); IntraMuscular; Deltoid Left.; 0.5 milliLiter(s); VIS (VIS Published: 15-Aug-2019, VIS Presented: 20-Mar-2020);   Influenza, injectable,quadrivalent, preservative free, pediatric; 30-Oct-2020 09:25; Yaritza George (BILLIE); [employer]; [id number] (Exp. Date: 30-Jul-2021); IntraMuscular; Deltoid Left.; 0.5 milliLiter(s); VIS (VIS Published: 15-Aug-2019, VIS Presented: 30-Oct-2020);

## 2021-09-01 NOTE — PROGRESS NOTE PEDS - SUBJECTIVE AND OBJECTIVE BOX
SURGERY DAILY PROGRESS NOTE:     SUBJECTIVE/ROS: Afebrile, no episodes of emesis. Tolerating diet with good BM    OBJECTIVE:  Vital Signs Last 24 Hrs  T(C): 36.8 (29 Aug 2021 21:20), Max: 37.2 (29 Aug 2021 17:50)  T(F): 98.2 (29 Aug 2021 21:20), Max: 99 (29 Aug 2021 17:50)  HR: 93 (29 Aug 2021 21:20) (78 - 93)  BP: 138/84 (29 Aug 2021 21:20) (123/84 - 138/84)  BP(mean): --  RR: 18 (29 Aug 2021 21:20) (16 - 18)  SpO2: 96% (29 Aug 2021 21:20) (96% - 99%)    I&O's Detail    28 Aug 2021 07:01  -  29 Aug 2021 07:00  --------------------------------------------------------  IN:    dextrose 5% + sodium chloride 0.9% + potassium chloride 20 mEq/L - Pediatric: 1350 mL    Fat Emulsion (Plant Based) 20% Infusion (Peds): 120 mL    Oral Fluid: 540 mL    PPN (Peripheral Parenteral Nutrition): 1440 mL  Total IN: 3450 mL    OUT:    Ileostomy (mL): 500 mL    Voided (mL): 2075 mL  Total OUT: 2575 mL    Total NET: 875 mL      29 Aug 2021 07:01  -  30 Aug 2021 00:32  --------------------------------------------------------  IN:    Fat Emulsion (Plant Based) 20% Infusion (Peds): 120 mL    Fat Emulsion (Plant Based) 20% Infusion (Peds): 50 mL    Oral Fluid: 600 mL    PPN (Peripheral Parenteral Nutrition): 2040 mL  Total IN: 2810 mL    OUT:    Ileostomy (mL): 800 mL    Voided (mL): 1475 mL  Total OUT: 2275 mL    Total NET: 535 mL    PHYSICAL EXAM:  GENERAL: NAD, well-groomed, well-developed  HEENT: NC/AT  CHEST/LUNG: Breathing even, unlabored  HEART: Regular rate and rhythm  ABDOMEN: Soft, nondistended. Incision C/D/I. Ostomy bag in place at R side w/ gas and liquid stool present. Normal incisional tenderness.   NEURO:  No focal deficits    MEDICATIONS  (STANDING):  dextrose 5% + sodium chloride 0.9% with potassium chloride 20 mEq/L. - Pediatric 1000 milliLiter(s) (120 mL/Hr) IV Continuous <Continuous>  fat emulsion  (Plant Based) 20% Infusion - Pediatric 0.455 Gm/kG/Day (10 mL/Hr) IV Continuous <Continuous>  pantoprazole  IV Intermittent - Peds 40 milliGRAM(s) IV Intermittent daily  Parenteral Nutrition - Pediatric 1 Each (120 mL/Hr) TPN Continuous <Continuous>    MEDICATIONS  (PRN):  acetaminophen   Oral Tab/Cap - Peds. 650 milliGRAM(s) Oral every 6 hours PRN Mild Pain (1 - 3)  ondansetron IV Intermittent - Peds 4 milliGRAM(s) IV Intermittent every 8 hours PRN Nausea      LABS:                                     13.2   8.39  )-----------( 408      ( 28 Aug 2021 10:14 )             39.6     08-29    139  |  104  |  12  ----------------------------<  117<H>  4.3   |  21<L>  |  0.77    Ca    9.4      29 Aug 2021 08:11  Phos  3.6     08-29  Mg     2.20     08-29    TPro  7.0  /  Alb  4.3  /  TBili  0.3  /  DBili  x   /  AST  15  /  ALT  56<H>  /  AlkPhos  80  08-29    PT/INR - ( 27 Aug 2021 13:45 )   PT: 13.2 sec;   INR: 1.16 ratio         PTT - ( 27 Aug 2021 13:45 )  PTT:29.3 sec                             SURGERY DAILY PROGRESS NOTE:     SUBJECTIVE/ROS: Afebrile, no episodes of emesis. Tolerating diet with good BM    Vital Signs Last 24 Hrs  T(C): 36.8 (01 Sep 2021 06:34), Max: 37 (31 Aug 2021 17:25)  T(F): 98.2 (01 Sep 2021 06:34), Max: 98.6 (31 Aug 2021 17:25)  HR: 85 (01 Sep 2021 06:34) (85 - 110)  BP: 128/83 (01 Sep 2021 06:34) (112/68 - 128/84)  BP(mean): --  RR: 20 (01 Sep 2021 06:34) (18 - 24)  SpO2: 98% (01 Sep 2021 06:34) (95% - 98%)    I&O's Detail    31 Aug 2021 07:01  -  01 Sep 2021 07:00  --------------------------------------------------------  IN:    IV PiggyBack: 500 mL    Oral Fluid: 1440 mL  Total IN: 1940 mL    OUT:    Ileostomy (mL): 1470 mL    Voided (mL): 1860 mL  Total OUT: 3330 mL    Total NET: -1390 mL          PHYSICAL EXAM:  GENERAL: NAD, well-groomed, well-developed  HEENT: NC/AT  CHEST/LUNG: Breathing even, unlabored  HEART: Regular rate and rhythm  ABDOMEN: Soft, nondistended. Incision C/D/I. Ostomy bag in place at R side w/ gas and liquid stool present. Normal incisional tenderness.   NEURO:  No focal deficits    MEDICATIONS  (STANDING):  dextrose 5% + sodium chloride 0.9% with potassium chloride 20 mEq/L. - Pediatric 1000 milliLiter(s) (120 mL/Hr) IV Continuous <Continuous>  fat emulsion  (Plant Based) 20% Infusion - Pediatric 0.455 Gm/kG/Day (10 mL/Hr) IV Continuous <Continuous>  pantoprazole  IV Intermittent - Peds 40 milliGRAM(s) IV Intermittent daily  Parenteral Nutrition - Pediatric 1 Each (120 mL/Hr) TPN Continuous <Continuous>    MEDICATIONS  (PRN):  acetaminophen   Oral Tab/Cap - Peds. 650 milliGRAM(s) Oral every 6 hours PRN Mild Pain (1 - 3)  ondansetron IV Intermittent - Peds 4 milliGRAM(s) IV Intermittent every 8 hours PRN Nausea      LABS:                                     13.2   8.39  )-----------( 408      ( 28 Aug 2021 10:14 )             39.6     08-29    139  |  104  |  12  ----------------------------<  117<H>  4.3   |  21<L>  |  0.77    Ca    9.4      29 Aug 2021 08:11  Phos  3.6     08-29  Mg     2.20     08-29    TPro  7.0  /  Alb  4.3  /  TBili  0.3  /  DBili  x   /  AST  15  /  ALT  56<H>  /  AlkPhos  80  08-29    PT/INR - ( 27 Aug 2021 13:45 )   PT: 13.2 sec;   INR: 1.16 ratio         PTT - ( 27 Aug 2021 13:45 )  PTT:29.3 sec

## 2021-09-01 NOTE — DISCHARGE NOTE NURSING/CASE MANAGEMENT/SOCIAL WORK - NSDCPEFALRISK_GEN_ALL_CORE
For information on Fall & injury Prevention, visit [email or web address]

## 2021-09-09 ENCOUNTER — NON-APPOINTMENT (OUTPATIENT)
Age: 21
End: 2021-09-09

## 2021-09-20 ENCOUNTER — APPOINTMENT (OUTPATIENT)
Dept: PEDIATRIC SURGERY | Facility: CLINIC | Age: 21
End: 2021-09-20

## 2021-09-21 DIAGNOSIS — Z93.2 ILEOSTOMY STATUS: ICD-10-CM

## 2021-09-22 ENCOUNTER — APPOINTMENT (OUTPATIENT)
Dept: PEDIATRIC SURGERY | Facility: CLINIC | Age: 21
End: 2021-09-22
Payer: COMMERCIAL

## 2021-09-22 VITALS — BODY MASS INDEX: 30.86 KG/M2 | TEMPERATURE: 97.9 F | HEIGHT: 70.87 IN | WEIGHT: 220.46 LBS

## 2021-09-22 PROCEDURE — 99024 POSTOP FOLLOW-UP VISIT: CPT

## 2021-09-28 NOTE — ASSESSMENT
[FreeTextEntry1] : Juan is a 20 year old male 1 month s/p laparoscopic completion proctectomy and ileal pouch-anal anastomosis  and diverting loop ileostomy.\par \par On exam, his abdomen is soft and nontender. He appears well overall and I offered reassurance. I reviewed pre-surgical expectations before I reverse the ileostomy including a clear liquid diet the day before. I reviewed the risks of bleeding, infection, anastomotic leak, pouchitis, stool incontinence, bowel obstructions, and need for future procedures. I discussed that I will want a preoperative contrast enema to assess the pouch. He has indicated his understanding. He will see Dr. Gitlin of ped urology to follow up with the renal stone. They have my information and know to contact me sooner with any questions or concerns. \par \par I gave him a convex wafer to use on a flatter stoma he will need to use a belt with it.  He will try it out at home if this is a better fit i will order it from their DME,  inverted wafer [id number].  He did not want to change it in the office, prefers changing it at home. \par \par

## 2021-09-28 NOTE — CONSULT LETTER
[Dear  ___] : Dear  [unfilled], [Consult Letter:] : I had the pleasure of evaluating your patient, [unfilled]. [Please see my note below.] : Please see my note below. [Consult Closing:] : Thank you very much for allowing me to participate in the care of this patient.  If you have any questions, please do not hesitate to contact me. [Sincerely,] : Sincerely, [FreeTextEntry2] : Nicholas Holland MD [FreeTextEntry3] : Pedro Liang MD FAAP FACS\par Director, [facility]\par Division of Pediatric General, Thoracic and Endoscopic Surgery\par [facility]

## 2021-09-28 NOTE — PHYSICAL EXAM
[NL] : grossly intact [TextBox_37] : stoma in place [TextBox_59] : deferred [TextBox_86] : Skin surrounding the stoma is red and irritated

## 2021-10-22 ENCOUNTER — OUTPATIENT (OUTPATIENT)
Dept: OUTPATIENT SERVICES | Age: 21
LOS: 1 days | End: 2021-10-22

## 2021-10-22 VITALS
SYSTOLIC BLOOD PRESSURE: 160 MMHG | WEIGHT: 231.04 LBS | RESPIRATION RATE: 16 BRPM | HEIGHT: 71.1 IN | HEART RATE: 116 BPM | TEMPERATURE: 98 F | DIASTOLIC BLOOD PRESSURE: 90 MMHG | OXYGEN SATURATION: 98 %

## 2021-10-22 DIAGNOSIS — Z98.890 OTHER SPECIFIED POSTPROCEDURAL STATES: Chronic | ICD-10-CM

## 2021-10-22 DIAGNOSIS — K51.90 ULCERATIVE COLITIS, UNSPECIFIED, WITHOUT COMPLICATIONS: ICD-10-CM

## 2021-10-22 DIAGNOSIS — K92.1 MELENA: ICD-10-CM

## 2021-10-22 DIAGNOSIS — Z93.9 ARTIFICIAL OPENING STATUS, UNSPECIFIED: Chronic | ICD-10-CM

## 2021-10-22 DIAGNOSIS — Z90.49 ACQUIRED ABSENCE OF OTHER SPECIFIED PARTS OF DIGESTIVE TRACT: Chronic | ICD-10-CM

## 2021-10-22 LAB
ALBUMIN SERPL ELPH-MCNC: 4.9 G/DL — SIGNIFICANT CHANGE UP (ref 3.3–5)
ALP SERPL-CCNC: 101 U/L — SIGNIFICANT CHANGE UP (ref 40–120)
ALT FLD-CCNC: 38 U/L — SIGNIFICANT CHANGE UP (ref 4–41)
ANION GAP SERPL CALC-SCNC: 14 MMOL/L — SIGNIFICANT CHANGE UP (ref 7–14)
AST SERPL-CCNC: 24 U/L — SIGNIFICANT CHANGE UP (ref 4–40)
BILIRUB SERPL-MCNC: 0.4 MG/DL — SIGNIFICANT CHANGE UP (ref 0.2–1.2)
BLD GP AB SCN SERPL QL: NEGATIVE — SIGNIFICANT CHANGE UP
BUN SERPL-MCNC: 10 MG/DL — SIGNIFICANT CHANGE UP (ref 7–23)
CALCIUM SERPL-MCNC: 9.7 MG/DL — SIGNIFICANT CHANGE UP (ref 8.4–10.5)
CHLORIDE SERPL-SCNC: 102 MMOL/L — SIGNIFICANT CHANGE UP (ref 98–107)
CO2 SERPL-SCNC: 22 MMOL/L — SIGNIFICANT CHANGE UP (ref 22–31)
CREAT SERPL-MCNC: 0.74 MG/DL — SIGNIFICANT CHANGE UP (ref 0.5–1.3)
GLUCOSE SERPL-MCNC: 92 MG/DL — SIGNIFICANT CHANGE UP (ref 70–99)
HCT VFR BLD CALC: 42.2 % — SIGNIFICANT CHANGE UP (ref 39–50)
HGB BLD-MCNC: 13.9 G/DL — SIGNIFICANT CHANGE UP (ref 13–17)
MCHC RBC-ENTMCNC: 26.7 PG — LOW (ref 27–34)
MCHC RBC-ENTMCNC: 32.9 GM/DL — SIGNIFICANT CHANGE UP (ref 32–36)
MCV RBC AUTO: 81 FL — SIGNIFICANT CHANGE UP (ref 80–100)
NRBC # BLD: 0 /100 WBCS — SIGNIFICANT CHANGE UP
NRBC # FLD: 0 K/UL — SIGNIFICANT CHANGE UP
PLATELET # BLD AUTO: 370 K/UL — SIGNIFICANT CHANGE UP (ref 150–400)
POTASSIUM SERPL-MCNC: 4.5 MMOL/L — SIGNIFICANT CHANGE UP (ref 3.5–5.3)
POTASSIUM SERPL-SCNC: 4.5 MMOL/L — SIGNIFICANT CHANGE UP (ref 3.5–5.3)
PROT SERPL-MCNC: 7.6 G/DL — SIGNIFICANT CHANGE UP (ref 6–8.3)
RBC # BLD: 5.21 M/UL — SIGNIFICANT CHANGE UP (ref 4.2–5.8)
RBC # FLD: 14.2 % — SIGNIFICANT CHANGE UP (ref 10.3–14.5)
RH IG SCN BLD-IMP: POSITIVE — SIGNIFICANT CHANGE UP
SODIUM SERPL-SCNC: 138 MMOL/L — SIGNIFICANT CHANGE UP (ref 135–145)
WBC # BLD: 9.44 K/UL — SIGNIFICANT CHANGE UP (ref 3.8–10.5)
WBC # FLD AUTO: 9.44 K/UL — SIGNIFICANT CHANGE UP (ref 3.8–10.5)

## 2021-10-22 RX ORDER — LOPERAMIDE HCL 2 MG
1 TABLET ORAL
Qty: 0 | Refills: 0 | DISCHARGE

## 2021-10-22 NOTE — H&P PST ADULT - PROBLEM SELECTOR PLAN 1
scheduled for rigid sigmoidoscopy, ileostomy closure on 10/28/21 with Dr. Liang    *Will email Dr. Liang to confirm he is aware of recent bloody "mucoid" stool. scheduled for rigid sigmoidoscopy, ileostomy closure on 10/28/21 with Dr. Liang

## 2021-10-22 NOTE — H&P PST ADULT - ALLERGIC/IMMUNOLOGIC
Diagnosis/Chief Complaint


Date of Admission


2019 at 18:41


Date of Discharge





Admission Diagnosis


Admission Diagnosis


Induction of labor at 39 1/7 wga





Discharge Diagnosis


Induction of labor at 39 1/7 wga - s/p 





Discharge Summary-OBS


Procedures


None.


Discharge Physical Examination


Allergies:  


Coded Allergies:  


     No Known Drug Allergies (Unverified , 19)


Vitals & I&Os





Vital Sign - Last 12Hours








  Date Time  Temp Pulse Resp B/P (MAP) Pulse Ox O2 Delivery O2 Flow Rate FiO2


 


19 07:35 97.8 92 16 120/80 (93) 96 Room Air  


 


19 12:45       10.00 








General Appearance:  Alert, Oriented X3, Cooperative


Psych/Mental Status:  Mood NL





Hospital Course


Routine postpartum care.


Labs





Microbiology


19 Urine Culture - Final, Complete


          3 or more isolates





Discharge


Instructions to patient/family


Please see electronic discharge instructions given to patient.


Discharge Medications


Reviewed and agree with Discharge Medication list on patient's Discharge 

Instruction sheet





Clinical Quality Measures


DVT/VTE Risk/Contraindication:


Risk Factor Score Per Nursin


RFS Level Per Nursing on Admit:  1=Low/No VTE PPX





Copy


Copies To 1:   LISANDRO DAVE MD, LINDA K DO                2019 09:54
negative

## 2021-10-22 NOTE — H&P PST ADULT - HISTORY OF PRESENT ILLNESS
22 yo M with PMH significant for a restrictive VSD (spontaneously closed) and ulcerative colitis. He is s/p subtotal colectomy with ostomy on 4/2021 and laparoscopic completion proctectomy, ileal pouch-anal anastomosis and diverting loop ileostomy on 8/20/2021 both with Dr. Liang at [facility] [place]. Pt denies any current abdominal pain, nausea, or breakdown around the stoma. However he reports in the past 2-3 days he has noted bloody mucoid stools from the anus, which he informed Dr. Liang and Dr. Martin and was reportedly advised to obtain a CT scan pre-op.     No h/o anesthetic or surgical complications with prior procedures. However, Pt reports prolonged hospital stay of 2 weeks after most recent surgery due to N/V and abdominal pain.     Denies any recent acute illness in the past two weeks.   Denies any known COVID exposure.   COVID PCR testing: scheduled for 10/25/21.     22 yo M with PMH significant for a restrictive VSD (spontaneously closed) and ulcerative colitis. He is s/p subtotal colectomy with ostomy on 4/2021 and laparoscopic completion proctectomy, ileal pouch-anal anastomosis and diverting loop ileostomy on 8/20/2021 both with Dr. Liang at [facility] [place]. Pt denies any current abdominal pain, nausea, or breakdown around the stoma. However he reports in the past 2-3 days he has noted bloody mucoid stools from his anus. Dr. Liang and Dr. Martin are reportedly aware.     No h/o anesthetic or surgical complications with prior procedures. However, Pt reports prolonged hospital stay of 2 weeks after most recent surgery due to N/V and abdominal pain.     Denies any recent acute illness in the past two weeks.   Denies any known COVID exposure.   COVID PCR testing: scheduled for 10/25/21.

## 2021-10-22 NOTE — H&P PST ADULT - NSICDXPASTMEDICALHX_GEN_ALL_CORE_FT
PAST MEDICAL HISTORY:  H/O Clostridium difficile infection     Kidney stone     Ulcerative colitis     VSD (ventricular septal defect) Closed

## 2021-10-22 NOTE — H&P PST ADULT - NSICDXPASTSURGICALHX_GEN_ALL_CORE_FT
PAST SURGICAL HISTORY:  History of creation of ostomy 4/2021  laparoscopic completion proctectomy and ileal pouch-anal anastomosis and diverting loop ileostomy on 8/20/2021    S/P colectomy 4/2021 subtotal colectomy      S/P colonoscopy & endoscopy 2017

## 2021-10-22 NOTE — H&P PST ADULT - PROBLEM SELECTOR PLAN 2
Dr. Liang and Dr. Martin aware of recent bloody mucoid stools are following up with Pt and family.   *Confirmed via email.

## 2021-10-22 NOTE — H&P PST ADULT - ASSESSMENT
20yo M with no evidence of acute illness or infection.     No known personal or family h/o adverse reactions to anesthesia or excessive bleeding.     Pt is aware to notify surgeon's office if he develops any s/s of acute illness prior to DOS.     *Chlorhexidine wipes given. States understanding of use.   Healthcare proxy form provided.

## 2021-10-22 NOTE — H&P PST ADULT - REASON FOR ADMISSION
PST evaluation in preparation for rigid sigmoidoscopy, ileostomy closure with Dr. Liang on 10/28/21.

## 2021-10-22 NOTE — H&P PST ADULT - COMMENTS
Evaluated by nephrologist, Dr. Lozano in 2019 due to elevated BP. Serum creatinine and electrolytes were "normal" as well as TFTs, renin, aldosterone and metanephrines. Lifestyle modification was recommended. HTN medication was not started.   Pt reports he is often anxious at medical exams.

## 2021-10-22 NOTE — H&P PST ADULT - ATTENDING COMMENTS
ARIADNA ONEAL is a 21y boy with UC s/p TPC/IPAA here for stoma reversal and EUA   I have discussed all of the risks benefits and alternatives of the procedure including but not limited to bleeding, infection, anastomotic leak, pouchitis, need for future surgeries, incontinence, pouch failure, small bowel obstruction./  Consent is signed and on chart.

## 2021-10-22 NOTE — H&P PST ADULT - GENITOURINARY COMMENTS
h/o kidney stone, passed 9/18/21. Evaluated by urology, Dr. Gitlin, Dr. Shirley. States 24 hour urine collection was completed. Denies any current pain.

## 2021-10-24 ENCOUNTER — NON-APPOINTMENT (OUTPATIENT)
Age: 21
End: 2021-10-24

## 2021-10-24 LAB
C DIFF TOX GENS STL QL NAA+PROBE: NORMAL
CDIFF BY PCR: NOT DETECTED

## 2021-10-25 ENCOUNTER — APPOINTMENT (OUTPATIENT)
Dept: PEDIATRIC SURGERY | Facility: CLINIC | Age: 21
End: 2021-10-25

## 2021-10-26 PROBLEM — N20.0 CALCULUS OF KIDNEY: Chronic | Status: ACTIVE | Noted: 2021-10-22

## 2021-10-26 LAB — SARS-COV-2 N GENE NPH QL NAA+PROBE: NOT DETECTED

## 2021-10-27 ENCOUNTER — APPOINTMENT (OUTPATIENT)
Dept: PEDIATRIC SURGERY | Facility: CLINIC | Age: 21
End: 2021-10-27

## 2021-10-31 ENCOUNTER — TRANSCRIPTION ENCOUNTER (OUTPATIENT)
Age: 21
End: 2021-10-31

## 2021-11-01 ENCOUNTER — APPOINTMENT (OUTPATIENT)
Dept: CT IMAGING | Facility: HOSPITAL | Age: 21
End: 2021-11-01
Payer: COMMERCIAL

## 2021-11-01 ENCOUNTER — RESULT REVIEW (OUTPATIENT)
Age: 21
End: 2021-11-01

## 2021-11-01 ENCOUNTER — OUTPATIENT (OUTPATIENT)
Dept: OUTPATIENT SERVICES | Facility: HOSPITAL | Age: 21
LOS: 1 days | End: 2021-11-01

## 2021-11-01 DIAGNOSIS — Z93.9 ARTIFICIAL OPENING STATUS, UNSPECIFIED: Chronic | ICD-10-CM

## 2021-11-01 DIAGNOSIS — Z98.890 OTHER SPECIFIED POSTPROCEDURAL STATES: Chronic | ICD-10-CM

## 2021-11-01 DIAGNOSIS — Z98.890 OTHER SPECIFIED POSTPROCEDURAL STATES: ICD-10-CM

## 2021-11-01 DIAGNOSIS — K51.90 ULCERATIVE COLITIS, UNSPECIFIED, WITHOUT COMPLICATIONS: ICD-10-CM

## 2021-11-01 DIAGNOSIS — Z90.49 ACQUIRED ABSENCE OF OTHER SPECIFIED PARTS OF DIGESTIVE TRACT: Chronic | ICD-10-CM

## 2021-11-01 PROCEDURE — 74178 CT ABD&PLV WO CNTR FLWD CNTR: CPT | Mod: 26

## 2021-11-02 ENCOUNTER — TRANSCRIPTION ENCOUNTER (OUTPATIENT)
Age: 21
End: 2021-11-02

## 2021-11-03 ENCOUNTER — RESULT REVIEW (OUTPATIENT)
Age: 21
End: 2021-11-03

## 2021-11-03 ENCOUNTER — OUTPATIENT (OUTPATIENT)
Dept: OUTPATIENT SERVICES | Age: 21
LOS: 1 days | Discharge: HOME | End: 2021-11-03
Payer: COMMERCIAL

## 2021-11-03 VITALS
OXYGEN SATURATION: 97 % | SYSTOLIC BLOOD PRESSURE: 123 MMHG | RESPIRATION RATE: 18 BRPM | DIASTOLIC BLOOD PRESSURE: 80 MMHG | HEART RATE: 126 BPM

## 2021-11-03 VITALS
HEART RATE: 129 BPM | RESPIRATION RATE: 18 BRPM | OXYGEN SATURATION: 100 % | SYSTOLIC BLOOD PRESSURE: 147 MMHG | HEIGHT: 70.87 IN | DIASTOLIC BLOOD PRESSURE: 83 MMHG | TEMPERATURE: 98 F | WEIGHT: 229.94 LBS

## 2021-11-03 DIAGNOSIS — Z90.49 ACQUIRED ABSENCE OF OTHER SPECIFIED PARTS OF DIGESTIVE TRACT: Chronic | ICD-10-CM

## 2021-11-03 DIAGNOSIS — Z93.9 ARTIFICIAL OPENING STATUS, UNSPECIFIED: Chronic | ICD-10-CM

## 2021-11-03 DIAGNOSIS — K51.90 ULCERATIVE COLITIS, UNSPECIFIED, WITHOUT COMPLICATIONS: ICD-10-CM

## 2021-11-03 DIAGNOSIS — Z98.890 OTHER SPECIFIED POSTPROCEDURAL STATES: Chronic | ICD-10-CM

## 2021-11-03 PROCEDURE — 88305 TISSUE EXAM BY PATHOLOGIST: CPT | Mod: 26

## 2021-11-03 PROCEDURE — 45330 DIAGNOSTIC SIGMOIDOSCOPY: CPT

## 2021-11-03 NOTE — ASU DISCHARGE PLAN (ADULT/PEDIATRIC) - CARE PROVIDER_API CALL
Nomi Martin)  Pediatrics  [address], [address]  [place], [place] [id number]  Phone: [phone]  Fax: [phone]  Follow Up Time:

## 2021-11-03 NOTE — ASU DISCHARGE PLAN (ADULT/PEDIATRIC) - A. DRIVE A CAR, OPERATE POWER TOOLS OR MACHINERY
[facility] for Tobacco Control email [email or web address]
Statement Selected

## 2021-11-04 ENCOUNTER — APPOINTMENT (OUTPATIENT)
Dept: PEDIATRIC SURGERY | Facility: CLINIC | Age: 21
End: 2021-11-04

## 2021-11-04 DIAGNOSIS — Z01.818 ENCOUNTER FOR OTHER PREPROCEDURAL EXAMINATION: ICD-10-CM

## 2021-11-05 LAB
SARS-COV-2 N GENE NPH QL NAA+PROBE: NOT DETECTED
SURGICAL PATHOLOGY STUDY: SIGNIFICANT CHANGE UP

## 2021-11-07 ENCOUNTER — TRANSCRIPTION ENCOUNTER (OUTPATIENT)
Age: 21
End: 2021-11-07

## 2021-11-08 ENCOUNTER — RESULT REVIEW (OUTPATIENT)
Age: 21
End: 2021-11-08

## 2021-11-08 ENCOUNTER — INPATIENT (INPATIENT)
Age: 21
LOS: 1 days | Discharge: HOME | End: 2021-11-10
Attending: HOSPITALIST | Admitting: HOSPITALIST
Payer: COMMERCIAL

## 2021-11-08 VITALS
SYSTOLIC BLOOD PRESSURE: 125 MMHG | TEMPERATURE: 98 F | HEART RATE: 130 BPM | DIASTOLIC BLOOD PRESSURE: 84 MMHG | RESPIRATION RATE: 18 BRPM | HEIGHT: 71.1 IN | OXYGEN SATURATION: 97 % | WEIGHT: 231.04 LBS

## 2021-11-08 DIAGNOSIS — Z98.890 OTHER SPECIFIED POSTPROCEDURAL STATES: Chronic | ICD-10-CM

## 2021-11-08 DIAGNOSIS — Z90.49 ACQUIRED ABSENCE OF OTHER SPECIFIED PARTS OF DIGESTIVE TRACT: Chronic | ICD-10-CM

## 2021-11-08 DIAGNOSIS — Z93.9 ARTIFICIAL OPENING STATUS, UNSPECIFIED: Chronic | ICD-10-CM

## 2021-11-08 DIAGNOSIS — K51.90 ULCERATIVE COLITIS, UNSPECIFIED, WITHOUT COMPLICATIONS: ICD-10-CM

## 2021-11-08 PROCEDURE — 44625 REPAIR BOWEL OPENING: CPT | Mod: 58

## 2021-11-08 PROCEDURE — 88304 TISSUE EXAM BY PATHOLOGIST: CPT | Mod: 26

## 2021-11-08 RX ORDER — HYDROMORPHONE HYDROCHLORIDE 2 MG/ML
0.5 INJECTION INTRAMUSCULAR; INTRAVENOUS; SUBCUTANEOUS
Refills: 0 | Status: DISCONTINUED | OUTPATIENT
Start: 2021-11-08 | End: 2021-11-09

## 2021-11-08 RX ORDER — BENZOCAINE AND MENTHOL 5; 1 G/100ML; G/100ML
1 LIQUID ORAL
Refills: 0 | Status: DISCONTINUED | OUTPATIENT
Start: 2021-11-08 | End: 2021-11-10

## 2021-11-08 RX ORDER — HEPARIN SODIUM 5000 [USP'U]/ML
5000 INJECTION INTRAVENOUS; SUBCUTANEOUS EVERY 8 HOURS
Refills: 0 | Status: DISCONTINUED | OUTPATIENT
Start: 2021-11-08 | End: 2021-11-09

## 2021-11-08 RX ORDER — FENTANYL CITRATE 50 UG/ML
50 INJECTION INTRAVENOUS
Refills: 0 | Status: DISCONTINUED | OUTPATIENT
Start: 2021-11-08 | End: 2021-11-09

## 2021-11-08 RX ORDER — CEFOTETAN DISODIUM 1 G
2000 VIAL (EA) INJECTION ONCE
Refills: 0 | Status: COMPLETED | OUTPATIENT
Start: 2021-11-08 | End: 2021-11-08

## 2021-11-08 RX ORDER — KETOROLAC TROMETHAMINE 30 MG/ML
15 SYRINGE (ML) INJECTION EVERY 6 HOURS
Refills: 0 | Status: DISCONTINUED | OUTPATIENT
Start: 2021-11-08 | End: 2021-11-10

## 2021-11-08 RX ORDER — ACETAMINOPHEN 500 MG
650 TABLET ORAL EVERY 6 HOURS
Refills: 0 | Status: DISCONTINUED | OUTPATIENT
Start: 2021-11-08 | End: 2021-11-10

## 2021-11-08 RX ORDER — SODIUM CHLORIDE 9 MG/ML
1000 INJECTION, SOLUTION INTRAVENOUS
Refills: 0 | Status: DISCONTINUED | OUTPATIENT
Start: 2021-11-08 | End: 2021-11-09

## 2021-11-08 RX ORDER — ONDANSETRON 8 MG/1
4 TABLET, FILM COATED ORAL ONCE
Refills: 0 | Status: COMPLETED | OUTPATIENT
Start: 2021-11-08 | End: 2021-11-08

## 2021-11-08 RX ORDER — OXYCODONE HYDROCHLORIDE 5 MG/1
5 TABLET ORAL ONCE
Refills: 0 | Status: DISCONTINUED | OUTPATIENT
Start: 2021-11-08 | End: 2021-11-09

## 2021-11-08 RX ORDER — OXYCODONE HYDROCHLORIDE 5 MG/1
5 TABLET ORAL EVERY 6 HOURS
Refills: 0 | Status: DISCONTINUED | OUTPATIENT
Start: 2021-11-08 | End: 2021-11-10

## 2021-11-08 RX ADMIN — SODIUM CHLORIDE 150 MILLILITER(S): 9 INJECTION, SOLUTION INTRAVENOUS at 20:50

## 2021-11-08 RX ADMIN — Medication 15 MILLIGRAM(S): at 22:30

## 2021-11-08 RX ADMIN — Medication 15 MILLIGRAM(S): at 22:06

## 2021-11-08 RX ADMIN — ONDANSETRON 8 MILLIGRAM(S): 8 TABLET, FILM COATED ORAL at 21:09

## 2021-11-08 RX ADMIN — FENTANYL CITRATE 50 MICROGRAM(S): 50 INJECTION INTRAVENOUS at 21:30

## 2021-11-08 RX ADMIN — FENTANYL CITRATE 50 MICROGRAM(S): 50 INJECTION INTRAVENOUS at 21:15

## 2021-11-08 NOTE — ASU PATIENT PROFILE, ADULT - ACCEPTABLE
[FreeTextEntry1] : GEN:  NAD, AAOx3.\par INSPECTION:  No effusion, discoloration. Normal patellar tracking. \par GAIT: Non-antalgic.\par Knee AROM: Full and pain free.\par PALPATION:  No effusion, warmth or crepitus. (+) TTP L-MJL. No TTP patellar facets, lateral joint line, popliteal fossa, patellar tendon, quad tendon, hamstrings, ITB.  \par MOTOR:  5/5 bilateral lower limbs. No spasticity, tremor, atrophy or contractures.\par SPECIAL:  Hyperflexion (+) Left, Mary (-) bilaterally, Medial/Lateral compression (-) bilaterally, Varus/Valgus stress (-) bilaterally, Single Leg Squat (-) bilaterally, Anterior/Posterior drawer (-) bilaterally, Lachman (-) bilaterally.\par 
0

## 2021-11-08 NOTE — ASU PATIENT PROFILE, ADULT - TEACHING/LEARNING CULTURAL CONSIDERATIONS
BP readings are intermittently elevated. Request for CBC and comp met panel today. Start Losartan 25 mg p.o.q.day Give him #30 with 11 refills. Side effects: low blood pressure, dizziness, elevated potassium level, cough, if he is allergic- rash, itching, hive, swelling of throat, lips and tongue. If these occur, stop the med and call us. BP and [place] check in 2 weeks.   none

## 2021-11-08 NOTE — BRIEF OPERATIVE NOTE - OPERATION/FINDINGS
EUA with widely patent anastomosis at around 3cm.  Sized to 18 Hegar. Stapled ileostomy reversal completed.

## 2021-11-09 RX ORDER — HEPARIN SODIUM 5000 [USP'U]/ML
5000 INJECTION INTRAVENOUS; SUBCUTANEOUS EVERY 12 HOURS
Refills: 0 | Status: DISCONTINUED | OUTPATIENT
Start: 2021-11-09 | End: 2021-11-10

## 2021-11-09 RX ORDER — DEXTROSE MONOHYDRATE, SODIUM CHLORIDE, AND POTASSIUM CHLORIDE 50; .745; 4.5 G/1000ML; G/1000ML; G/1000ML
1000 INJECTION, SOLUTION INTRAVENOUS
Refills: 0 | Status: DISCONTINUED | OUTPATIENT
Start: 2021-11-09 | End: 2021-11-09

## 2021-11-09 RX ORDER — CIPROFLOXACIN LACTATE 400MG/40ML
500 VIAL (ML) INTRAVENOUS EVERY 12 HOURS
Refills: 0 | Status: DISCONTINUED | OUTPATIENT
Start: 2021-11-09 | End: 2021-11-09

## 2021-11-09 RX ORDER — CIPROFLOXACIN LACTATE 400MG/40ML
500 VIAL (ML) INTRAVENOUS EVERY 12 HOURS
Refills: 0 | Status: DISCONTINUED | OUTPATIENT
Start: 2021-11-09 | End: 2021-11-10

## 2021-11-09 RX ORDER — CEFOTETAN DISODIUM 1 G
2000 VIAL (EA) INJECTION ONCE
Refills: 0 | Status: COMPLETED | OUTPATIENT
Start: 2021-11-09 | End: 2021-11-09

## 2021-11-09 RX ORDER — DEXTROSE MONOHYDRATE, SODIUM CHLORIDE, AND POTASSIUM CHLORIDE 50; .745; 4.5 G/1000ML; G/1000ML; G/1000ML
1000 INJECTION, SOLUTION INTRAVENOUS
Refills: 0 | Status: DISCONTINUED | OUTPATIENT
Start: 2021-11-09 | End: 2021-11-10

## 2021-11-09 RX ADMIN — Medication 15 MILLIGRAM(S): at 16:21

## 2021-11-09 RX ADMIN — Medication 650 MILLIGRAM(S): at 07:55

## 2021-11-09 RX ADMIN — Medication 500 MILLIGRAM(S): at 18:26

## 2021-11-09 RX ADMIN — HEPARIN SODIUM 5000 UNIT(S): 5000 INJECTION INTRAVENOUS; SUBCUTANEOUS at 18:30

## 2021-11-09 RX ADMIN — Medication 15 MILLIGRAM(S): at 04:00

## 2021-11-09 RX ADMIN — Medication 15 MILLIGRAM(S): at 04:15

## 2021-11-09 RX ADMIN — BENZOCAINE AND MENTHOL 1 LOZENGE: 5; 1 LIQUID ORAL at 00:01

## 2021-11-09 RX ADMIN — Medication 15 MILLIGRAM(S): at 22:12

## 2021-11-09 RX ADMIN — Medication 15 MILLIGRAM(S): at 23:00

## 2021-11-09 RX ADMIN — Medication 15 MILLIGRAM(S): at 10:04

## 2021-11-09 RX ADMIN — DEXTROSE MONOHYDRATE, SODIUM CHLORIDE, AND POTASSIUM CHLORIDE 150 MILLILITER(S): 50; .745; 4.5 INJECTION, SOLUTION INTRAVENOUS at 11:57

## 2021-11-09 RX ADMIN — DEXTROSE MONOHYDRATE, SODIUM CHLORIDE, AND POTASSIUM CHLORIDE 75 MILLILITER(S): 50; .745; 4.5 INJECTION, SOLUTION INTRAVENOUS at 19:12

## 2021-11-09 RX ADMIN — Medication 650 MILLIGRAM(S): at 20:06

## 2021-11-09 RX ADMIN — Medication 650 MILLIGRAM(S): at 02:30

## 2021-11-09 RX ADMIN — Medication 650 MILLIGRAM(S): at 13:52

## 2021-11-09 RX ADMIN — Medication 100 MILLIGRAM(S): at 11:14

## 2021-11-09 RX ADMIN — Medication 650 MILLIGRAM(S): at 21:00

## 2021-11-09 RX ADMIN — HEPARIN SODIUM 5000 UNIT(S): 5000 INJECTION INTRAVENOUS; SUBCUTANEOUS at 06:17

## 2021-11-09 RX ADMIN — Medication 650 MILLIGRAM(S): at 02:04

## 2021-11-09 RX ADMIN — SODIUM CHLORIDE 150 MILLILITER(S): 9 INJECTION, SOLUTION INTRAVENOUS at 10:05

## 2021-11-09 RX ADMIN — Medication 650 MILLIGRAM(S): at 08:30

## 2021-11-09 RX ADMIN — Medication 15 MILLIGRAM(S): at 17:01

## 2021-11-09 NOTE — PROGRESS NOTE PEDS - ASSESSMENT
21 year old male POD 1 s/p ileostomy takedown.    - Doing well, Tolerating PO diet and adequate PO liquids. Voiding (x4) and BM (x3). Denies pain. Vitals WNR, afebrile.   - Encourage OOBTC  - Advance diet

## 2021-11-09 NOTE — PROGRESS NOTE PEDS - SUBJECTIVE AND OBJECTIVE BOX
PEDIATRIC GENERAL SURGERY PROGRESS NOTE    Ulcerative colitis without complications    ARIADNA ONEAL  |  [id number]      21 year old male POD 1 s/p ileostomy takedown. Patient examined at admitting floor bed. Resting comfortably. Tolerating PO diet and adequate PO liquids. Voiding (x4) and BM (x3). Denies pain. Vitals WNR, afebrile.       O:   Vital Signs Last 24 Hrs  T(C): 36.6 (09 Nov 2021 22:25), Max: 37.2 (09 Nov 2021 14:05)  T(F): 97.9 (09 Nov 2021 22:25), Max: 98.9 (09 Nov 2021 14:05)  HR: 110 (09 Nov 2021 22:25) (84 - 110)  BP: 135/83 (09 Nov 2021 22:25) (123/72 - 149/80)  BP(mean): 91 (09 Nov 2021 08:00) (82 - 97)  RR: 18 (09 Nov 2021 22:25) (14 - 18)  SpO2: 95% (09 Nov 2021 22:25) (95% - 100%)    PHYSICAL EXAM:  GENERAL: NAD, well-groomed, well-developed  HEENT: NC/AT  CHEST/LUNG: Breathing even, unlabored  HEART: Regular rate and rhythm  ABDOMEN: abdomen soft, nontender, RLQ surgical site with aleevyn in place  EXTREMITIES: good distal pulses b/l   NEURO:  No focal deficits      11-08-21 @ 07:01  -  11-09-21 @ 07:00  --------------------------------------------------------  IN: 1770 mL / OUT: 200 mL / NET: 1570 mL    11-09-21 @ 07:01  -  11-09-21 @ 22:57  --------------------------------------------------------  IN: 2130 mL / OUT: 600 mL / NET: 1530 mL

## 2021-11-10 ENCOUNTER — TRANSCRIPTION ENCOUNTER (OUTPATIENT)
Age: 21
End: 2021-11-10

## 2021-11-10 VITALS
SYSTOLIC BLOOD PRESSURE: 143 MMHG | RESPIRATION RATE: 18 BRPM | TEMPERATURE: 98 F | HEART RATE: 134 BPM | DIASTOLIC BLOOD PRESSURE: 77 MMHG

## 2021-11-10 RX ORDER — CIPROFLOXACIN LACTATE 400MG/40ML
1 VIAL (ML) INTRAVENOUS
Qty: 12 | Refills: 0
Start: 2021-11-10 | End: 2021-11-15

## 2021-11-10 RX ORDER — ACETAMINOPHEN 500 MG
2 TABLET ORAL
Qty: 0 | Refills: 0 | DISCHARGE
Start: 2021-11-10

## 2021-11-10 RX ORDER — IBUPROFEN 200 MG
1 TABLET ORAL
Qty: 0 | Refills: 0 | DISCHARGE

## 2021-11-10 RX ADMIN — Medication 15 MILLIGRAM(S): at 00:00

## 2021-11-10 RX ADMIN — Medication 650 MILLIGRAM(S): at 09:41

## 2021-11-10 RX ADMIN — Medication 650 MILLIGRAM(S): at 08:24

## 2021-11-10 RX ADMIN — DEXTROSE MONOHYDRATE, SODIUM CHLORIDE, AND POTASSIUM CHLORIDE 75 MILLILITER(S): 50; .745; 4.5 INJECTION, SOLUTION INTRAVENOUS at 07:18

## 2021-11-10 RX ADMIN — Medication 650 MILLIGRAM(S): at 14:16

## 2021-11-10 RX ADMIN — Medication 650 MILLIGRAM(S): at 13:52

## 2021-11-10 RX ADMIN — Medication 500 MILLIGRAM(S): at 06:12

## 2021-11-10 RX ADMIN — Medication 15 MILLIGRAM(S): at 03:55

## 2021-11-10 RX ADMIN — Medication 15 MILLIGRAM(S): at 10:08

## 2021-11-10 RX ADMIN — Medication 650 MILLIGRAM(S): at 02:06

## 2021-11-10 RX ADMIN — HEPARIN SODIUM 5000 UNIT(S): 5000 INJECTION INTRAVENOUS; SUBCUTANEOUS at 06:39

## 2021-11-10 RX ADMIN — Medication 650 MILLIGRAM(S): at 03:00

## 2021-11-10 RX ADMIN — Medication 15 MILLIGRAM(S): at 10:41

## 2021-11-10 NOTE — DISCHARGE NOTE PROVIDER - NSDCQMSTROKE_NEU_ALL_CORE
"[facility] [[id number]]  Chief Complaint   Patient presents with     RECHECK     Acne/ Hair Loss/ Scalp Irritation     Initial Ht 5' 3.78\" (162 cm)   Wt 140 lb 6.9 oz (63.7 kg)   BMI 24.27 kg/m   Estimated body mass index is 24.27 kg/m  as calculated from the following:    Height as of this encounter: 5' 3.78\" (162 cm).    Weight as of this encounter: 140 lb 6.9 oz (63.7 kg).  Medication Reconciliation: complete     Bria Mcgrath CMA    "
No

## 2021-11-10 NOTE — PROGRESS NOTE PEDS - ATTENDING COMMENTS
Patient seen and examined    POD 1 s/p ileostomy takedown. Doing well. Tolerating clear liquids.   No flatus, had 3 BM.  On exam, abdomen soft, nontender, RLQ surgical site with aleevyn in place  Doing well  Advance diet, OOB
Patient seen and examined    No acute events overnight  Doing well  Had 3-6 BMs overnight  Abdomen soft, nontender, prior ileostomy site healing  Discharge home today

## 2021-11-10 NOTE — DISCHARGE NOTE PROVIDER - HOSPITAL COURSE
2 yo M with PMH significant for a restrictive VSD (spontaneously closed) and ulcerative colitis. He is s/p subtotal colectomy with ostomy on 4/2021 and laparoscopic completion proctectomy, ileal pouch-anal anastomosis and diverting loop ileostomy on 8/20/2021 both with Dr. Liang at [facility] [place] [place].     Patient presents this hospitalization for ostomy takedown. Patient hospital course unremarkable. Patient OOB, tolerating diet, stooling appropriately. 20 yo M with PMH significant for a restrictive VSD (spontaneously closed) and ulcerative colitis. He is s/p subtotal colectomy with ostomy on 4/2021 and laparoscopic completion proctectomy, ileal pouch-anal anastomosis and diverting loop ileostomy on 8/20/2021 [place] [facility] [place]    [place] [facility] [place].  He was taken to the OR with Dr. Liang on 11/8 for an EUA and stapled ileostomy reversal.  He tolerated the procedure well and was transferred from pacu to floor in stable condition.  His diet was advanced and pain was well controlled.  He started having bowel function on POD 1.       Today on POD2, he is afebrile with normal vital signs, tolerating a regular diet, voiding and stooling normally, and pain is well controlled.  He is deemed stable for discharge to home.

## 2021-11-10 NOTE — PROGRESS NOTE PEDS - ASSESSMENT
21 year old male POD 1 s/p ileostomy takedown.    - Doing well, Tolerating PO diet and adequate PO liquids. Voiding (x4) and BM (x3). Denies pain. Vitals WNR, afebrile.   - Encourage OOBTC  - Advance diet     21 year old male POD 1 s/p ileostomy takedown.    - Encourage OOBTC  - Monitor bowel movements   - Likely discharge today  - Advance diet

## 2021-11-10 NOTE — DISCHARGE NOTE PROVIDER - NSDCFUADDINST_GEN_ALL_CORE_FT
PAIN: You may continue to take  Acetaminophen (Tylenol) and  Ibuprofen (Advil, Motrin) over the counter for pain.   WOUND CARE:  You should allow warm soapy water to run down the wound in the shower. You do not need to scrub the area. You do not have any stitches that need to be removed.   BATHING: Please do not soak or submerge the wound in water (bath, swimming) for 7 days after your surgery.  ACTIVITY: No heavy lifting, straining, or vigorous activity until your follow-up appointment in 2 weeks.   NOTIFY US IF: Your child has any bleeding that does not stop, any pus draining from his/her wound(s), any fever (over 100.4 F) or chills, persistent nausea/vomiting, persistent diarrhea, or if his/her pain is not controlled on their discharge pain medications.  FOLLOW-UP: Please call the office and make an appointment to follow up with Dr. Liang in 2 weeks. Please follow up with your primary care physician in 1-2 weeks regarding your hospitalization.      **PLEASE NOTE OUR CLINIC HAS RECENTLY MOVED LOCATIONS. OUR NEW PHONE NUMBER IS [phone].**

## 2021-11-10 NOTE — DISCHARGE NOTE NURSING/CASE MANAGEMENT/SOCIAL WORK - PATIENT PORTAL LINK FT
You can access the FollowMyHealth Patient Portal offered by [facility] by registering at the following website: [email or web address]. By joining [email or web address]’s FollowMyHealth portal, you will also be able to view your health information using other applications (apps) compatible with our system.

## 2021-11-10 NOTE — DISCHARGE NOTE PROVIDER - CARE PROVIDER_API CALL
Pedro Liang)  Pediatric Surgery; Surgery  [address], [address]  [place] [address]  Phone: [phone]  Fax: [phone]  Follow Up Time: 2 weeks

## 2021-11-10 NOTE — PROGRESS NOTE PEDS - SUBJECTIVE AND OBJECTIVE BOX
PEDIATRIC GENERAL SURGERY PROGRESS NOTE    Ulcerative colitis without complications    ARIADNA ONEAL  |  [id number]      21 year old male POD 1 s/p ileostomy takedown. Patient examined at admitting floor bed. Resting comfortably. Tolerating PO diet and adequate PO liquids. Voiding (x4) and BM (x3). Denies pain. Vitals WNR, afebrile.       O:  ICU Vital Signs Last 24 Hrs  T(C): 36.6 (09 Nov 2021 22:25), Max: 37.2 (09 Nov 2021 14:05)  T(F): 97.9 (09 Nov 2021 22:25), Max: 98.9 (09 Nov 2021 14:05)  HR: 110 (09 Nov 2021 22:25) (84 - 110)  BP: 135/83 (09 Nov 2021 22:25) (123/72 - 148/80)  BP(mean): 91 (09 Nov 2021 08:00) (82 - 97)  ABP: --  ABP(mean): --  RR: 18 (09 Nov 2021 22:25) (14 - 18)  SpO2: 95% (09 Nov 2021 22:25) (95% - 100%)      PHYSICAL EXAM:  GENERAL: NAD, well-groomed, well-developed  HEENT: NC/AT  CHEST/LUNG: Breathing even, unlabored  HEART: Regular rate and rhythm  ABDOMEN: abdomen soft, nontender, RLQ surgical site with aleevyn in place  EXTREMITIES: good distal pulses b/l   NEURO:  No focal deficits      11-08-21 @ 07:01  -  11-09-21 @ 07:00  --------------------------------------------------------  IN: 1770 mL / OUT: 200 mL / NET: 1570 mL    11-09-21 @ 07:01  -  11-10-21 @ 00:50  --------------------------------------------------------  IN: 2430 mL / OUT: 600 mL / NET: 1830 mL     PEDIATRIC GENERAL SURGERY PROGRESS NOTE    Ulcerative colitis without complications    ARIADNA ONEAL  |  [id number]      21 year old male POD 1 s/p ileostomy takedown. Patient examined at admitting floor bed. Resting comfortably. Tolerating PO diet and adequate PO liquids. Voiding and having BM. Denies pain. Vitals WNR, afebrile. ambulating       O:  ICU Vital Signs Last 24 Hrs  T(C): 36.6 (09 Nov 2021 22:25), Max: 37.2 (09 Nov 2021 14:05)  T(F): 97.9 (09 Nov 2021 22:25), Max: 98.9 (09 Nov 2021 14:05)  HR: 110 (09 Nov 2021 22:25) (84 - 110)  BP: 135/83 (09 Nov 2021 22:25) (123/72 - 148/80)  BP(mean): 91 (09 Nov 2021 08:00) (82 - 97)  ABP: --  ABP(mean): --  RR: 18 (09 Nov 2021 22:25) (14 - 18)  SpO2: 95% (09 Nov 2021 22:25) (95% - 100%)      PHYSICAL EXAM:  GENERAL: NAD, well-groomed, well-developed  HEENT: NC/AT  CHEST/LUNG: Breathing even, unlabored  HEART: Regular rate and rhythm  ABDOMEN: abdomen soft, nontender, RLQ surgical site with aleevyn in place  EXTREMITIES: good distal pulses b/l   NEURO:  No focal deficits      11-08-21 @ 07:01  -  11-09-21 @ 07:00  --------------------------------------------------------  IN: 1770 mL / OUT: 200 mL / NET: 1570 mL    11-09-21 @ 07:01  -  11-10-21 @ 00:50  --------------------------------------------------------  IN: 2430 mL / OUT: 600 mL / NET: 1830 mL

## 2021-11-10 NOTE — DISCHARGE NOTE NURSING/CASE MANAGEMENT/SOCIAL WORK - NSDCVIVACCINE_GEN_ALL_CORE_FT
Influenza, injectable,quadrivalent, preservative free, pediatric; 25-Oct-2018 15:50; Christy Lux (RN); [employer]; [id number] (Exp. Date: 30-Jun-2019); IntraMuscular; Deltoid Left.; 0.5 milliLiter(s); VIS (VIS Published: 07-Aug-2015, VIS Presented: 25-Oct-2018);   Influenza, injectable,quadrivalent, preservative free, pediatric; 20-Mar-2020 10:05; Lina Hernadez); [employer]; [id number] (Exp. Date: 30-Jun-2020); IntraMuscular; Deltoid Left.; 0.5 milliLiter(s); VIS (VIS Published: 15-Aug-2019, VIS Presented: 20-Mar-2020);   Influenza, injectable,quadrivalent, preservative free, pediatric; 30-Oct-2020 09:25; Yaritza George (BILLIE); [employer]; [id number] (Exp. Date: 30-Jul-2021); IntraMuscular; Deltoid Left.; 0.5 milliLiter(s); VIS (VIS Published: 15-Aug-2019, VIS Presented: 30-Oct-2020);

## 2021-11-10 NOTE — DISCHARGE NOTE PROVIDER - NSDCMRMEDTOKEN_GEN_ALL_CORE_FT
acetaminophen 325 mg oral tablet: 2 tab(s) orally every 6 hours  ibuprofen 200 mg oral capsule: 1 cap(s) orally every 6 hours, As Needed  Imodium 2 mg oral capsule: 1 cap(s) orally 2 times a day   acetaminophen 325 mg oral tablet: 2 tab(s) orally every 6 hours  ciprofloxacin 500 mg oral tablet: 1 tab(s) orally every 12 hours  ibuprofen 200 mg oral capsule: 1 cap(s) orally every 6 hours, As Needed  Imodium 2 mg oral capsule: 1 cap(s) orally 2 times a day

## 2021-11-15 ENCOUNTER — NON-APPOINTMENT (OUTPATIENT)
Age: 21
End: 2021-11-15

## 2021-11-16 LAB — SURGICAL PATHOLOGY STUDY: SIGNIFICANT CHANGE UP

## 2021-12-01 NOTE — ED PEDIATRIC TRIAGE NOTE - TEMP(CELSIUS)
"Introduced Child Life Services to mom and pt. Emotional support provided. Activities and movies brought in for distraction and coping. Sat with pt while mom took a \"coffee\" break, Played and talked with pt at bedside at that time.   Denied any other needs at this time. Will continue to provide support and follow.  " 37.2

## 2021-12-06 NOTE — ASU PREOP CHECKLIST, PEDIATRIC - NS PREOP CHK CHLOROHEX WASH
1:1 at bedside, mary COTTON pt is currently talking to self +auditory hallucinators, pt is calm, security called 2x for pt wanding but are "tied up with a code gray" N/A

## 2021-12-08 ENCOUNTER — APPOINTMENT (OUTPATIENT)
Dept: PEDIATRIC SURGERY | Facility: CLINIC | Age: 21
End: 2021-12-08
Payer: COMMERCIAL

## 2021-12-08 VITALS
SYSTOLIC BLOOD PRESSURE: 149 MMHG | DIASTOLIC BLOOD PRESSURE: 99 MMHG | HEART RATE: 139 BPM | BODY MASS INDEX: 32.56 KG/M2 | TEMPERATURE: 98.2 F | WEIGHT: 232.59 LBS | OXYGEN SATURATION: 99 % | HEIGHT: 70.87 IN

## 2021-12-08 DIAGNOSIS — Z98.890 OTHER SPECIFIED POSTPROCEDURAL STATES: ICD-10-CM

## 2021-12-08 PROCEDURE — 99024 POSTOP FOLLOW-UP VISIT: CPT

## 2022-01-11 ENCOUNTER — NON-APPOINTMENT (OUTPATIENT)
Age: 22
End: 2022-01-11

## 2022-01-12 ENCOUNTER — APPOINTMENT (OUTPATIENT)
Dept: PEDIATRIC SURGERY | Facility: CLINIC | Age: 22
End: 2022-01-12
Payer: COMMERCIAL

## 2022-01-12 VITALS
BODY MASS INDEX: 32.94 KG/M2 | OXYGEN SATURATION: 99 % | WEIGHT: 237.88 LBS | SYSTOLIC BLOOD PRESSURE: 153 MMHG | DIASTOLIC BLOOD PRESSURE: 103 MMHG | HEIGHT: 71.26 IN | TEMPERATURE: 97.6 F | HEART RATE: 123 BPM

## 2022-01-12 PROBLEM — Z98.890 S/P CLOSURE OF ILEOSTOMY: Status: ACTIVE | Noted: 2021-09-21

## 2022-01-12 PROCEDURE — 99024 POSTOP FOLLOW-UP VISIT: CPT

## 2022-01-12 NOTE — REASON FOR VISIT
[____ Month(s)] : [unfilled] month(s)  [Other: ____] : [unfilled] [Patient] : patient [Normal bowel movements] : ~He/She~ has normal bowel movements [Tolerating Diet] : ~He/She~ is tolerating diet [Pain] : ~He/She~ does not have pain [Fever] : ~He/She~ does not have fever [Vomiting] : ~He/She~ does not have vomiting [Redness at incision] : ~He/She~ does not have redness at incision [Drainage at incision] : ~He/She~ does not have drainage at incision [Swelling at surgical site] : ~He/She~ does not have swelling at surgical site [de-identified] : 11-8-21 [de-identified] : Dr Liang [de-identified] : Juan is a 20 yo male with hx UC,s/p ileostomy closure 11-8-21 IPAA 8-20-21,  s/p subtotal colectomy with end ileostomy 4-9-21.  He is s/p ileostomy closure.  The incisional is healing well.  He is taking Imodium 2 x daily in the am and qhs.  Stooling 7-8 x daily mostly in the am and evening,  denies accidents.  Remains on a constipating diet and probiotic.  He is aware of s/s of pouchitis. He has not had any pain or discomfort. He denies any accidents and leakage over night.

## 2022-01-12 NOTE — REASON FOR VISIT
[Pain] : ~He/She~ does not have pain [Fever] : ~He/She~ does not have fever [Vomiting] : ~He/She~ does not have vomiting [Redness at incision] : ~He/She~ does not have redness at incision [Drainage at incision] : ~He/She~ does not have drainage at incision [Swelling at surgical site] : ~He/She~ does not have swelling at surgical site [de-identified] : 11-8-21 [de-identified] : Dr Liang [de-identified] : Juan is a 20 yo male with hx UC,s/p ileostomy closure 11-8-21 IPAA 8-20-21,  s/p subtotal colectomy with end ileostomy 4-9-21.  He is s/p ileostomy closure.  The incisional is healing well.  He is taking Imodium 2 x daily in the am and qhs.  Stooling 7-8 x daily mostly in the am and evening,  denies accidents.  Remains on a constipating diet and probiotic.  He is aware of s/s of pouchitis. He has not had any pain or discomfort. He denies any accidents and leakage over night. Last seen at 1 month post op there was concerns about some spitting sutures at the ileostomy site.  He presents for a f/u visit and evaluation of the ileostomy closure site. \par \par He reached out to Dr Liang due to new onset of increased BM up to 12 per day with accidents and poor control and pain.  He was started on Cipro and Flagyl and told to f/u in the office today.  Before then he was stooling 5 x daily w good control. Denies any diet or medication changes.  Denies emesis or fever.

## 2022-01-12 NOTE — ADDENDUM
[FreeTextEntry1] : Documented by Odin Ham acting as a scribe for Dr. Liang on 12/08/2021.\par \par All medical record entries made by the Scribe were at my, Dr. Liang , direction and personally dictated by me on 12/08/2021. I have reviewed the chart and agree that the record accurately reflects my personal performances of the history, physical exam, assessment and plan. I have also personally directed, reviewed, and agree with the discharge instructions.

## 2022-01-12 NOTE — END OF VISIT
[FreeTextEntry3] : I was present with the NP during the key portions of the history and exam and performed an examination as well. I agree with the findings and plan as documented unless otherwise documented below.\par \par 22 yo with UC s/p TPC/IPAA with ileostomy now 2 months s/p stoma takedown.  Of note he had a bout of pouchitis before the stoma closure that responded well to antibiotics.  He had his BMs controlled to 5x/day until Sunday when he developed cramping lower abdominal pain and more frequent BMs with some accidents.  We started cipro/flagyl last night.  Overall he looks well, with a benign abdomen and no stricture on rectal exam.  Recommended continuing 2 weeks of cipro/flagyl and daily VSL.  Asked him to update us with symptoms over the next 48 hours.  Reviewed pouchitis with him.  Explained that he may require pouchitis if persistent/recurrent moving forward.  I reviewed the reasons why he would need to come to the ED (worsening pain, fevers, bloody BMs, vomiting, poor PO intake). Case was discussed with Dr. Martin as well.  All questions answered.

## 2022-01-12 NOTE — CONSULT LETTER
[Dear  ___] : Dear  [unfilled], [Courtesy Letter:] : I had the pleasure of seeing your patient, [unfilled], in my office today. [Please see my note below.] : Please see my note below. [Sincerely,] : Sincerely, [FreeTextEntry2] : Nicholas Holland MD [FreeTextEntry3] : Pedro Liang MD FAAP FACS\par Director, [facility]\par Division of Pediatric General, Thoracic and Endoscopic Surgery\par [facility]\par

## 2022-01-12 NOTE — CONSULT LETTER
[FreeTextEntry2] : Nicholas Holland MD [FreeTextEntry3] : Pedro Liang MD FAAP FACS\par Director, [facility]\par Division of Pediatric General, Thoracic and Endoscopic Surgery\par [facility]\par

## 2022-01-12 NOTE — PHYSICAL EXAM
[Clean] : clean [Dry] : dry [Intact] : intact [NL] : soft, not tender, not distended [Erythema] : no erythema [Granulation tissue] : no granulation tissue [Drainage] : no drainage [TextBox_59] : on digital exam no stricture noted, anastomosis open at rectum

## 2022-01-12 NOTE — PHYSICAL EXAM
[Clean] : clean [Dry] : dry [Intact] : intact [NL] : grossly intact [Erythema] : no erythema [Granulation tissue] : no granulation tissue [Drainage] : no drainage

## 2022-01-12 NOTE — ASSESSMENT
[FreeTextEntry1] : Juan is a 20 yo male with hx UC,s/p ileostomy closure 11-8-21 IPAA 8-20-21,  s/p subtotal colectomy with end ileostomy 4-9-21.  He 2 months post op from  ileostomy closure.  He was doing well with 5 bowel movements per day until 3 days ago started with crampy abdominal pain and 12-15 movements per day with poor control.  \par \par Was started on Flagyl and cipro yesterday,  he has not felt any changes w symptoms yet.  On digital exam the anastomosis site is wide open, no concern of stricture formation.  His symptoms are more consistent with pouchitis.  Counselled him about monitoring his symptoms and keeping in touch with us.  Making sure he is  keeping hydrated and voiding q6 hours while awake.  If he has any changes with symptoms such as worsening diarrhea, pain fever or other concerning symptoms within the next 2 days he needs to reach out to us and most likely go to the Er for hydrating and further workup. .  \par \par I spoke with his mother after the visit for an update.  I mentioned the elevated b/p reading.  Per mom he has been worked up in the past by cardiology and was noted to be related to anxiety.  Counselled the family if he experiences any h/a, blurred vision or other symptoms related to elevated b/p he needs to f/u with PMD or cardiology

## 2022-01-12 NOTE — ASSESSMENT
[FreeTextEntry1] : Juan is a 21 year old male with UC s/p TPC/IPAA with diverting loop ileostomy now 1 month s/p ileostomy takedown. On exam, the incision and old stoma site has healed well with no evidence of an active infection. I reviewed the possibility of leakage from the incision and he should come back to have the sutures removed in the office if that occurs. Overall his stooling has been 7-8 times daily without cramping and without accidents or leakage. I have recommended that he continues his probiotics and to adjust his dosage of Imodium as needed. I discussed the risks of recurrent pouchitis and he is aware of signs and symptoms.  I would like to see him again in 1 month for a follow up visit, and a possible rectal exam. He has my information and knows to contact me sooner with any questions or concerns.

## 2022-01-17 RX ORDER — VEDOLIZUMAB 300 MG/5ML
300 INJECTION, POWDER, LYOPHILIZED, FOR SOLUTION INTRAVENOUS
Refills: 0 | Status: DISCONTINUED | COMMUNITY
End: 2022-01-17

## 2022-01-17 RX ORDER — VANCOMYCIN HYDROCHLORIDE 125 MG/1
125 CAPSULE ORAL 3 TIMES DAILY
Qty: 84 | Refills: 0 | Status: DISCONTINUED | COMMUNITY
Start: 2021-03-12 | End: 2022-01-17

## 2022-01-17 RX ORDER — CIPROFLOXACIN HYDROCHLORIDE 500 MG/1
500 TABLET, FILM COATED ORAL
Qty: 14 | Refills: 0 | Status: DISCONTINUED | COMMUNITY
Start: 2021-10-24 | End: 2022-01-17

## 2022-01-17 RX ORDER — TOFACITINIB 10 MG/1
10 TABLET, FILM COATED ORAL
Qty: 60 | Refills: 2 | Status: DISCONTINUED | COMMUNITY
Start: 2021-02-08 | End: 2022-01-17

## 2022-01-31 ENCOUNTER — TRANSCRIPTION ENCOUNTER (OUTPATIENT)
Age: 22
End: 2022-01-31

## 2022-02-02 ENCOUNTER — RESULT REVIEW (OUTPATIENT)
Age: 22
End: 2022-02-02

## 2022-02-02 ENCOUNTER — OUTPATIENT (OUTPATIENT)
Dept: OUTPATIENT SERVICES | Age: 22
LOS: 1 days | Discharge: HOME | End: 2022-02-02
Payer: COMMERCIAL

## 2022-02-02 ENCOUNTER — TRANSCRIPTION ENCOUNTER (OUTPATIENT)
Age: 22
End: 2022-02-02

## 2022-02-02 VITALS
RESPIRATION RATE: 20 BRPM | DIASTOLIC BLOOD PRESSURE: 60 MMHG | HEART RATE: 120 BPM | OXYGEN SATURATION: 96 % | SYSTOLIC BLOOD PRESSURE: 128 MMHG

## 2022-02-02 VITALS
WEIGHT: 236.56 LBS | RESPIRATION RATE: 20 BRPM | OXYGEN SATURATION: 96 % | HEART RATE: 142 BPM | DIASTOLIC BLOOD PRESSURE: 105 MMHG | TEMPERATURE: 100 F | SYSTOLIC BLOOD PRESSURE: 155 MMHG | HEIGHT: 70.87 IN

## 2022-02-02 DIAGNOSIS — Z90.49 ACQUIRED ABSENCE OF OTHER SPECIFIED PARTS OF DIGESTIVE TRACT: Chronic | ICD-10-CM

## 2022-02-02 DIAGNOSIS — Z98.890 OTHER SPECIFIED POSTPROCEDURAL STATES: Chronic | ICD-10-CM

## 2022-02-02 DIAGNOSIS — Z93.9 ARTIFICIAL OPENING STATUS, UNSPECIFIED: Chronic | ICD-10-CM

## 2022-02-02 DIAGNOSIS — K51.90 ULCERATIVE COLITIS, UNSPECIFIED, WITHOUT COMPLICATIONS: ICD-10-CM

## 2022-02-02 PROCEDURE — 45331 SIGMOIDOSCOPY AND BIOPSY: CPT

## 2022-02-02 PROCEDURE — 88305 TISSUE EXAM BY PATHOLOGIST: CPT | Mod: 26

## 2022-02-02 NOTE — ASU DISCHARGE PLAN (ADULT/PEDIATRIC) - NS MD DC FALL RISK RISK
For information on Fall & Injury Prevention, visit: [email or web address] OR  [email or web address] OR  https://www.cdc.gov/steadi/patient.html

## 2022-02-02 NOTE — ASU PREOP CHECKLIST, PEDIATRIC - SKIN PREP
[facility] now requests orders and shares plan of care/discharge summaries for some patients through [employer].  Please REPLY TO THIS MESSAGE OR ROUTE BACK TO THE AUTHOR in order to give authorization for orders when needed.  This is considered a verbal order, you will still receive a faxed copy of orders for signature.  Thank you for your assistance in improving collaboration for our patients.    ORDER    OT eval and treat of UE function, home safety equipment needs. OT ed in AE including stair glide, hospital bed, adapted silver ware. OT ed in beenfits of using AE to decrease pain and potetially aallow pt to do other things. Pt agreeable to getting estimate for stair glide. If he has a glide he may be able to sleep in bed upstairs.    ASSESSMENT/Treatment is skilled and necessary Pt can benefit from skilled OT to assist in getting AE. This pt is well know to this therapist as he has been seen on multiple episodes. In past pt has not been receptive to any AE or recommendations. Spouse tends to c/o that pt will not change anything but then herself is resistive to some. OT ed in potentially moving love seat and spouse reports there is no one to move it, no where to take it so is uncertain if pt could have a hospital bed. Spouse reports she cannot make descions she decides on things like what to make for dinner yet then states that she will not have  power chair in home because it take too much room. Pt and spouse are agreeable to consult for estimate for stair glide. Uncertain that they will actually do it      . Pt has MD appt next week and if wanting a bed that may be able to use that visit for a face to face. Medicare requires specific documentation be completed at time of visit in progress note.        Goals in 4 weeks 4 visits  Pt jourdan bardales for stair glide.   Pt will be given recommendatoins to improve sleeping  If agreeable pt will undergo cognitive testing to determine if he is capable  of making sound judgements  If pt undergoes cognitve testing recommendations will be give based on results.   Pt and spouse will have education re AE to assist pt in being moreind with dressing, bathing and self cares    n/a

## 2022-02-02 NOTE — ASU DISCHARGE PLAN (ADULT/PEDIATRIC) - CARE PROVIDER_API CALL
Nomi Martin)  Pediatrics  [address], [address]  [place], [place] [id number]  Phone: [phone]  Fax: [phone]  Follow Up Time:

## 2022-02-03 LAB — SURGICAL PATHOLOGY STUDY: SIGNIFICANT CHANGE UP

## 2022-02-04 ENCOUNTER — NON-APPOINTMENT (OUTPATIENT)
Age: 22
End: 2022-02-04

## 2022-02-04 RX ORDER — METRONIDAZOLE 500 MG/1
500 TABLET ORAL TWICE DAILY
Qty: 28 | Refills: 0 | Status: DISCONTINUED | COMMUNITY
Start: 2022-01-11 | End: 2022-02-04

## 2022-02-04 RX ORDER — CIPROFLOXACIN HYDROCHLORIDE 500 MG/1
500 TABLET, FILM COATED ORAL
Qty: 28 | Refills: 0 | Status: DISCONTINUED | COMMUNITY
Start: 2022-01-12 | End: 2022-02-04

## 2022-02-04 RX ORDER — LACTOBACIL 2/BIFIDO 1/S.THERMO 450B CELL
PACKET (EA) ORAL DAILY
Qty: 30 | Refills: 1 | Status: DISCONTINUED | COMMUNITY
Start: 2021-11-05 | End: 2022-02-04

## 2022-03-22 ENCOUNTER — TRANSCRIPTION ENCOUNTER (OUTPATIENT)
Age: 22
End: 2022-03-22

## 2022-03-23 ENCOUNTER — NON-APPOINTMENT (OUTPATIENT)
Age: 22
End: 2022-03-23

## 2022-03-23 ENCOUNTER — RESULT REVIEW (OUTPATIENT)
Age: 22
End: 2022-03-23

## 2022-03-23 ENCOUNTER — OUTPATIENT (OUTPATIENT)
Dept: OUTPATIENT SERVICES | Age: 22
LOS: 1 days | Discharge: HOME | End: 2022-03-23
Payer: COMMERCIAL

## 2022-03-23 VITALS
SYSTOLIC BLOOD PRESSURE: 127 MMHG | DIASTOLIC BLOOD PRESSURE: 82 MMHG | OXYGEN SATURATION: 97 % | RESPIRATION RATE: 18 BRPM | HEART RATE: 122 BPM

## 2022-03-23 VITALS
WEIGHT: 236.45 LBS | HEIGHT: 70.87 IN | SYSTOLIC BLOOD PRESSURE: 155 MMHG | RESPIRATION RATE: 18 BRPM | OXYGEN SATURATION: 96 % | DIASTOLIC BLOOD PRESSURE: 104 MMHG | HEART RATE: 146 BPM | TEMPERATURE: 98 F

## 2022-03-23 DIAGNOSIS — Z90.49 ACQUIRED ABSENCE OF OTHER SPECIFIED PARTS OF DIGESTIVE TRACT: Chronic | ICD-10-CM

## 2022-03-23 DIAGNOSIS — Z98.890 OTHER SPECIFIED POSTPROCEDURAL STATES: Chronic | ICD-10-CM

## 2022-03-23 DIAGNOSIS — K51.90 ULCERATIVE COLITIS, UNSPECIFIED, WITHOUT COMPLICATIONS: ICD-10-CM

## 2022-03-23 DIAGNOSIS — Z93.9 ARTIFICIAL OPENING STATUS, UNSPECIFIED: Chronic | ICD-10-CM

## 2022-03-23 PROCEDURE — 88305 TISSUE EXAM BY PATHOLOGIST: CPT | Mod: 26

## 2022-03-23 PROCEDURE — 88342 IMHCHEM/IMCYTCHM 1ST ANTB: CPT | Mod: 26

## 2022-03-23 NOTE — ASU DISCHARGE PLAN (ADULT/PEDIATRIC) - NS MD DC FALL RISK RISK
For information on Fall & Injury Prevention, visit: [email or web address] OR  [email or web address] OR  https://www.cdc.gov/steadi/patient.html

## 2022-03-23 NOTE — ASU DISCHARGE PLAN (ADULT/PEDIATRIC) - CALL YOUR DOCTOR IF YOU HAVE ANY OF THE FOLLOWING:
Bleeding that does not stop/Fever greater than (need to indicate Fahrenheit or Celsius)/Nausea and vomiting that does not stop/Excessive diarrhea/Inability to tolerate liquids or foods/Increased irritability or sluggishness

## 2022-03-23 NOTE — ASU DISCHARGE PLAN (ADULT/PEDIATRIC) - CARE PROVIDER_API CALL
Nomi Martin)  Pediatrics  [address], [address]  [place], [place] [id number]  Phone: [phone]  Fax: [phone]  Follow Up Time:

## 2022-03-23 NOTE — ASU PREOP CHECKLIST, PEDIATRIC - RESPIRATORY RATE (BREATHS/MIN)
18

## 2022-03-25 LAB — SURGICAL PATHOLOGY STUDY: SIGNIFICANT CHANGE UP

## 2022-03-29 ENCOUNTER — NON-APPOINTMENT (OUTPATIENT)
Age: 22
End: 2022-03-29

## 2022-05-13 ENCOUNTER — APPOINTMENT (OUTPATIENT)
Dept: PEDIATRIC GASTROENTEROLOGY | Facility: CLINIC | Age: 22
End: 2022-05-13
Payer: COMMERCIAL

## 2022-05-13 VITALS
WEIGHT: 246.98 LBS | BODY MASS INDEX: 33.82 KG/M2 | SYSTOLIC BLOOD PRESSURE: 160 MMHG | DIASTOLIC BLOOD PRESSURE: 78 MMHG | HEART RATE: 120 BPM | HEIGHT: 71.46 IN

## 2022-05-13 DIAGNOSIS — K91.850 POUCHITIS: ICD-10-CM

## 2022-05-13 PROCEDURE — 99214 OFFICE O/P EST MOD 30 MIN: CPT

## 2022-05-14 PROBLEM — K91.850 RECTAL POUCHITIS: Status: RESOLVED | Noted: 2021-11-05 | Resolved: 2022-05-14

## 2022-05-14 LAB
ALBUMIN SERPL ELPH-MCNC: 4.8 G/DL
ALP BLD-CCNC: 88 U/L
ALT SERPL-CCNC: 27 U/L
ANION GAP SERPL CALC-SCNC: 13 MMOL/L
AST SERPL-CCNC: 21 U/L
BASOPHILS # BLD AUTO: 0.09 K/UL
BASOPHILS NFR BLD AUTO: 0.9 %
BILIRUB SERPL-MCNC: 0.4 MG/DL
BUN SERPL-MCNC: 12 MG/DL
CALCIUM SERPL-MCNC: 10.4 MG/DL
CHLORIDE SERPL-SCNC: 106 MMOL/L
CHOLEST SERPL-MCNC: 168 MG/DL
CO2 SERPL-SCNC: 22 MMOL/L
CREAT SERPL-MCNC: 0.82 MG/DL
CRP SERPL-MCNC: <3 MG/L
EGFR: 128 ML/MIN/1.73M2
EOSINOPHIL # BLD AUTO: 0.31 K/UL
EOSINOPHIL NFR BLD AUTO: 3.2 %
FERRITIN SERPL-MCNC: 17 NG/ML
GGT SERPL-CCNC: 26 U/L
GLUCOSE SERPL-MCNC: 96 MG/DL
HCT VFR BLD CALC: 41.9 %
HDLC SERPL-MCNC: 45 MG/DL
HGB BLD-MCNC: 14.2 G/DL
IMM GRANULOCYTES NFR BLD AUTO: 0.2 %
IRON SATN MFR SERPL: 12 %
IRON SERPL-MCNC: 55 UG/DL
LDLC SERPL CALC-MCNC: 70 MG/DL
LYMPHOCYTES # BLD AUTO: 1.64 K/UL
LYMPHOCYTES NFR BLD AUTO: 16.8 %
MAN DIFF?: NORMAL
MCHC RBC-ENTMCNC: 28 PG
MCHC RBC-ENTMCNC: 33.9 GM/DL
MCV RBC AUTO: 82.6 FL
MONOCYTES # BLD AUTO: 0.86 K/UL
MONOCYTES NFR BLD AUTO: 8.8 %
NEUTROPHILS # BLD AUTO: 6.87 K/UL
NEUTROPHILS NFR BLD AUTO: 70.1 %
NONHDLC SERPL-MCNC: 123 MG/DL
PLATELET # BLD AUTO: 355 K/UL
POTASSIUM SERPL-SCNC: 4.7 MMOL/L
PROT SERPL-MCNC: 7.5 G/DL
RBC # BLD: 5.07 M/UL
RBC # FLD: 13.7 %
SODIUM SERPL-SCNC: 141 MMOL/L
TIBC SERPL-MCNC: 454 UG/DL
TRIGL SERPL-MCNC: 264 MG/DL
UIBC SERPL-MCNC: 399 UG/DL
WBC # FLD AUTO: 9.79 K/UL

## 2022-05-14 NOTE — CONSULT LETTER
[Dear  ___] : Dear  [unfilled], [Courtesy Letter:] : I had the pleasure of seeing your patient, [unfilled], in my office today. [Please see my note below.] : Please see my note below. [Consult Closing:] : Thank you very much for allowing me to participate in the care of this patient.  If you have any questions, please do not hesitate to contact me. [Sincerely,] : Sincerely, [FreeTextEntry3] : Nomi Martin MD MS\par [facility]\par

## 2022-05-14 NOTE — ASSESSMENT
[Educated Patient & Family about Diagnosis] : educated the patient and family about the diagnosis [FreeTextEntry1] : Juan is a 21 year old male with UC s/p colectomy and IPAA, with subsequent complication of chronic cuffitis.  Length of his cuff may be a confounding variable that needs to be surgically addressed in the future.  For now, trying to treat cuffitis medically with UC therapy.  Upadacinitib is having some meaningful benefits 4 weeks into therapy, so will continue another 4 weeks and then assess continuing at the current dose or step down to 30 mg maintenance dosing.

## 2022-05-14 NOTE — HISTORY OF PRESENT ILLNESS
[de-identified] : Overview: Juan presented in April 2017 with bloody diarrhea. He was positive for C difficile infection on initial stool tests, treated with vancomycin without complete resolution of symptoms. He then had an endoscopy and colonoscopy with moderate left sided ulcerative colitis diagnosed. He was started on prednisone 40 mg prednisone daily and Lialda 4.8 grams daily. He had clinical remission and successfully tapered his prednisone. However mild symptoms eventually returned, and he had repeat colonoscopy July 2018 with active confluent colitis to 55 cm from anal verge and normal right colon / ileum. He was started on Vedolizumab 300 mg September 2018, which led to clinical remission. He did well for about 2 years. In summer 2020 developed colitis symptoms just leading up to Entyvio infusion, calprotectin was 900. Repeat in October 2020 1736. He entered a clinical trial using transcutaneous vagal nerve stimulation but this did not lead to remission. He had a flare of symptoms and stopped the trial. He started Tofacitinib (Xeljanz) Feb 2021. At dose of 10 mg BID, he had a very good clinical response in first 2 weeks of induction dosing, then started to have much worse colitis symptoms again.  He ultimately had a laparoscopic subtotal colectomy with end ileostomy April 2021.  He had completion proctectomy, IPAA, and diverting loop ileostomy in August 2021, then ileostomy takedown November 2021.  In January 2022 Juan started having high frequency stools with urgency and rectal bleeding.  Pouchoscopy 2/2/2022 identified a healthy pouch but evidence of cuffitis with cuff length of about 5-6 cm appreciated.  He was treated with topical MD 5-ASA and steroid preparations without clinical improvement.  A repeat pouchoscopy 3/23/22 found similar findings without improvement in degree of cuffitis.  No pouchitis found.  He was started on Upadacitinib in April 2022.  \par \par Interval history: Juan has been on Upadacitinib (Rinvoq) 45 mg daily for induction of remission of his cuffitis (very short segment UC) for the past 4 weeks.  He has had improvement in measures of lower abdominal pain and cramping, rectal bleeding, and bowel urgency.  He reports not having any more pain and minimal bleeding.  His stools have some urgency 1-2 times per day, but this is much better overall.  The primary symptom that has not improvement much is stool frequency.  He averages between 7-15 bowel movements per day depending on the day.  Some stools are watery and some have a semi formed consistency.  \par \par Previous evaluations\par 4/2017\par Fecal calprotectin 321\par C difficile PCR: positive\par Quantiferon Gold: negative\par Hepatitis BsAg: negative, Hep BsAb: nonreactive.\par

## 2022-05-17 ENCOUNTER — RX RENEWAL (OUTPATIENT)
Age: 22
End: 2022-05-17

## 2022-06-15 ENCOUNTER — RX RENEWAL (OUTPATIENT)
Age: 22
End: 2022-06-15

## 2022-07-01 ENCOUNTER — TRANSCRIPTION ENCOUNTER (OUTPATIENT)
Age: 22
End: 2022-07-01

## 2022-07-02 NOTE — PATIENT PROFILE PEDIATRIC. - URINARY CATHETER
You can access the FollowMyHealth Patient Portal offered by [facility] by registering at the following website: [email or web address]. By joining [employer]’s FollowMyHealth portal, you will also be able to view your health information using other applications (apps) compatible with our system. no

## 2022-07-19 ENCOUNTER — RX RENEWAL (OUTPATIENT)
Age: 22
End: 2022-07-19

## 2022-09-16 ENCOUNTER — APPOINTMENT (OUTPATIENT)
Dept: PEDIATRIC GASTROENTEROLOGY | Facility: CLINIC | Age: 22
End: 2022-09-16

## 2022-09-16 VITALS — WEIGHT: 246.7 LBS | HEIGHT: 71.77 IN | BODY MASS INDEX: 33.78 KG/M2

## 2022-09-16 DIAGNOSIS — K51.90 ULCERATIVE COLITIS, UNSPECIFIED, W/OUT COMPLICATIONS: ICD-10-CM

## 2022-09-16 DIAGNOSIS — K62.89 OTHER POSTPROCEDURAL COMPLICATIONS AND DISORDERS OF DIGESTIVE SYSTEM: ICD-10-CM

## 2022-09-16 DIAGNOSIS — K91.89 OTHER POSTPROCEDURAL COMPLICATIONS AND DISORDERS OF DIGESTIVE SYSTEM: ICD-10-CM

## 2022-09-16 PROCEDURE — 99214 OFFICE O/P EST MOD 30 MIN: CPT

## 2022-09-16 RX ORDER — HYDROCORTISONE 100 MG/60ML
100 ENEMA RECTAL TWICE DAILY
Qty: 60 | Refills: 0 | Status: DISCONTINUED | COMMUNITY
Start: 2021-01-02 | End: 2022-09-16

## 2022-09-16 RX ORDER — HYDROCORTISONE ACETATE 25 MG/1
25 SUPPOSITORY RECTAL TWICE DAILY
Qty: 60 | Refills: 0 | Status: DISCONTINUED | COMMUNITY
Start: 2022-02-04 | End: 2022-09-16

## 2022-09-16 RX ORDER — BUDESONIDE 28 MG/1
2 AEROSOL, FOAM RECTAL
Qty: 30 | Refills: 1 | Status: DISCONTINUED | COMMUNITY
Start: 2022-02-04 | End: 2022-09-16

## 2022-09-16 RX ORDER — LOPERAMIDE HCL 2 MG
CAPSULE ORAL
Refills: 0 | Status: ACTIVE | COMMUNITY

## 2022-09-16 NOTE — DISCHARGE NOTE PROVIDER - NSDCCPCAREPLAN_GEN_ALL_CORE_FT
PRINCIPAL DISCHARGE DIAGNOSIS  Diagnosis: Ulcerative colitis  Assessment and Plan of Treatment:       SECONDARY DISCHARGE DIAGNOSES  Diagnosis: Bloody stool  Assessment and Plan of Treatment:     Diagnosis: Ulcerative colitis  Assessment and Plan of Treatment:      impaired balance/decreased strength

## 2022-09-18 PROBLEM — K51.90 ULCERATIVE COLITIS: Status: ACTIVE | Noted: 2019-03-09

## 2022-09-18 PROBLEM — K91.89 RECTAL CUFFITIS: Status: ACTIVE | Noted: 2022-05-14

## 2022-09-18 NOTE — ASSESSMENT
[Educated Patient & Family about Diagnosis] : educated the patient and family about the diagnosis [FreeTextEntry1] : Juan is a 21 year old male with UC s/p colectomy and IPAA complicated by chronic cuffitis without pouchitis.  He has had a clinical response to Upadacitinib but not to a degree that gives Juan satisfaction with qualify of life metrics, still dealing with high stool frequency and urgency.  \par \par Recommended plan\par - Continue UPA 30 mg daily\par - labs \par - consider additional therapies\par - Consider surgical intervention

## 2022-09-18 NOTE — CONSULT LETTER
[Dear  ___] : Dear  [unfilled], [Courtesy Letter:] : I had the pleasure of seeing your patient, [unfilled], in my office today. [Please see my note below.] : Please see my note below. [Consult Closing:] : Thank you very much for allowing me to participate in the care of this patient.  If you have any questions, please do not hesitate to contact me. [Sincerely,] : Sincerely, [FreeTextEntry3] : Nomi Martin MD MS\par [facility]\par

## 2022-09-18 NOTE — HISTORY OF PRESENT ILLNESS
[de-identified] : Overview: Juan presented in April 2017 with bloody diarrhea. He was positive for C difficile infection on initial stool tests, treated with vancomycin without complete resolution of symptoms. He then had an endoscopy and colonoscopy with moderate left sided ulcerative colitis diagnosed. He was started on prednisone 40 mg prednisone daily and Lialda 4.8 grams daily. He had clinical remission and successfully tapered his prednisone. However mild symptoms eventually returned, and he had repeat colonoscopy July 2018 with active confluent colitis to 55 cm from anal verge and normal right colon / ileum. He was started on Vedolizumab 300 mg September 2018, which led to clinical remission. He did well for about 2 years. In summer 2020 developed colitis symptoms just leading up to Entyvio infusion, calprotectin was 900. Repeat in October 2020 1736. He entered a clinical trial using transcutaneous vagal nerve stimulation but this did not lead to remission. He had a flare of symptoms and stopped the trial. He started Tofacitinib (Xeljanz) Feb 2021. At dose of 10 mg BID, he had a very good clinical response in first 2 weeks of induction dosing, then started to have much worse colitis symptoms again. He ultimately had a laparoscopic subtotal colectomy with end ileostomy April 2021. He had completion proctectomy, IPAA, and diverting loop ileostomy in August 2021, then ileostomy takedown November 2021. In January 2022 Juan started having high frequency stools with urgency and rectal bleeding. Pouchoscopy 2/2/2022 identified a healthy pouch but evidence of cuffitis with cuff length of about 5-6 cm appreciated. He was treated with topical ME 5-ASA and steroid preparations without clinical improvement. A repeat pouchoscopy 3/23/22 found similar findings without improvement in degree of cuffitis. No pouchitis found. He was started on Upadacitinib in April 2022.  Over 16 weeks of induction, he had improvement in symptoms including much reduced urgency, but continued to have high frequency of stools.  He then lowered his dose to maintenance 30 mg daily dosing.   \par \par Interval history: Juan has been on Upadacitinib (Rinvoq) 30 mg daily for the past 8 weeks.  He also takes Loperamide TID on T/Th when out of the house once daily the other 5 days.\par \par Estimating 10 bowel movements per day, sometimes urgency, especially when out of the house T/Th\par Rare urgency when at home, semi formed stool, no rectal bleeding. Able to sleep through the night well\par No abdominal pain, on rare occasion rectal discomfort with bowel movements\par Takes Upadacitinib 30 mg daily, good adherence.  No extraintestinal symptoms.  Quality of life definitely impacted by symptoms, difficult on his commute and socializing out of the house, especially in larger crowded places due to symptoms.

## 2022-09-28 LAB
ALBUMIN SERPL ELPH-MCNC: 5 G/DL
ALP BLD-CCNC: 77 U/L
ALT SERPL-CCNC: 30 U/L
ANION GAP SERPL CALC-SCNC: 16 MMOL/L
AST SERPL-CCNC: 20 U/L
BASOPHILS # BLD AUTO: 0.08 K/UL
BASOPHILS NFR BLD AUTO: 0.8 %
BILIRUB SERPL-MCNC: 0.6 MG/DL
BUN SERPL-MCNC: 12 MG/DL
CALCIUM SERPL-MCNC: 10.5 MG/DL
CHLORIDE SERPL-SCNC: 102 MMOL/L
CHOLEST SERPL-MCNC: 177 MG/DL
CO2 SERPL-SCNC: 22 MMOL/L
CREAT SERPL-MCNC: 0.86 MG/DL
CRP SERPL-MCNC: 7 MG/L
EGFR: 126 ML/MIN/1.73M2
EOSINOPHIL # BLD AUTO: 0.18 K/UL
EOSINOPHIL NFR BLD AUTO: 1.7 %
FERRITIN SERPL-MCNC: 15 NG/ML
GGT SERPL-CCNC: 30 U/L
GLUCOSE SERPL-MCNC: 98 MG/DL
HCT VFR BLD CALC: 41.4 %
HDLC SERPL-MCNC: 47 MG/DL
HGB BLD-MCNC: 13.6 G/DL
IMM GRANULOCYTES NFR BLD AUTO: 0.4 %
IRON SATN MFR SERPL: 13 %
IRON SERPL-MCNC: 69 UG/DL
LDLC SERPL CALC-MCNC: 95 MG/DL
LYMPHOCYTES # BLD AUTO: 1.75 K/UL
LYMPHOCYTES NFR BLD AUTO: 16.8 %
MAN DIFF?: NORMAL
MCHC RBC-ENTMCNC: 27.8 PG
MCHC RBC-ENTMCNC: 32.9 GM/DL
MCV RBC AUTO: 84.5 FL
MONOCYTES # BLD AUTO: 0.73 K/UL
MONOCYTES NFR BLD AUTO: 7 %
NEUTROPHILS # BLD AUTO: 7.65 K/UL
NEUTROPHILS NFR BLD AUTO: 73.3 %
NONHDLC SERPL-MCNC: 130 MG/DL
PLATELET # BLD AUTO: 402 K/UL
POTASSIUM SERPL-SCNC: 4.6 MMOL/L
PROT SERPL-MCNC: 8 G/DL
RBC # BLD: 4.9 M/UL
RBC # FLD: 12.8 %
SODIUM SERPL-SCNC: 140 MMOL/L
TIBC SERPL-MCNC: 522 UG/DL
TRIGL SERPL-MCNC: 175 MG/DL
UIBC SERPL-MCNC: 453 UG/DL
WBC # FLD AUTO: 10.43 K/UL

## 2022-12-19 ENCOUNTER — RX RENEWAL (OUTPATIENT)
Age: 22
End: 2022-12-19

## 2023-01-18 NOTE — ASU PATIENT PROFILE, PEDIATRIC - AS SC BRADEN FRICTION
(3) no apparent problem
Detail Level: Simple
Additional Notes: Patient consent was obtained to proceed with the visit and recommended plan of care after discussion of all risks and benefits, including the risks of COVID-19 exposure.

## 2023-02-10 ENCOUNTER — NON-APPOINTMENT (OUTPATIENT)
Age: 23
End: 2023-02-10

## 2023-05-29 ENCOUNTER — RX RENEWAL (OUTPATIENT)
Age: 23
End: 2023-05-29

## 2023-05-30 ENCOUNTER — RX RENEWAL (OUTPATIENT)
Age: 23
End: 2023-05-30

## 2023-05-30 RX ORDER — UPADACITINIB 30 MG/1
30 TABLET, EXTENDED RELEASE ORAL
Qty: 30 | Refills: 5 | Status: ACTIVE | COMMUNITY
Start: 2022-03-30 | End: 1900-01-01

## 2023-06-01 NOTE — ASU DISCHARGE PLAN (ADULT/PEDIATRIC) - SIGNS AND SYMPTOMS OF INFECTION: FEVER, REDNESS, SWELLING, FOUL SMELLING DISCHARGE
Patient would like to try Testopel. I like to start with 10 pellets. He has failed gel therapies. He has done injections but they have been painful. Levels without testosterone replacement have been 163 and 197.
Statement Selected

## 2023-10-11 NOTE — REASON FOR VISIT
Written and verbal instructions provided to patient and patient's significant other. Opportunity for questions provided, answered. Tolerating PO liquids without difficulty. Dr. Stauffer Neither came to discuss results with patient prior to discharge. [Other: ____] : [unfilled] [____ Month(s)] : [unfilled] month(s)  [Patient] : patient [Father] : father [de-identified] : [birth year] [de-identified] : Dr Liang [de-identified] : Juan is a 20 year old male with ulcerative colitis who had previously undergone laparoscopic total abdominal colectomy with end ileostomy  in 04/2021.  He is now 1 month post op from  completion proctectomy and Jpouch reconstruction. HE was d/c on Imodium taken qd for high ileostomy output  and Flomax for a urethral stone.\par \par He is having some peristomal irritation due to a flatter stoma and leaking.  He is using paste and crusting.  Takes Imodium 1 x daily.  When he was in the hospital 2 Imodium per day was too much she he is hesitant to take any more.   Good appetite.  He denies any emesis. He has been passing mucus and a small amount of blood from the anus, but he has control. He states that he has passed the stone a few days ago but he complains of persistent pain in the right posterior aspect of his abdomen.

## 2023-10-25 NOTE — DIETITIAN INITIAL EVALUATION PEDIATRIC - NUTRITION INTERVENTION
Meals and Snack/Nutrition Education/Collaboration and Referral of Nutrition Care 4 = No assist / stand by assistance

## 2025-01-06 ENCOUNTER — APPOINTMENT (OUTPATIENT)
Dept: COLORECTAL SURGERY | Facility: CLINIC | Age: 25
End: 2025-01-06
Payer: COMMERCIAL

## 2025-01-06 VITALS
BODY MASS INDEX: 32.2 KG/M2 | DIASTOLIC BLOOD PRESSURE: 92 MMHG | HEART RATE: 156 BPM | HEIGHT: 71 IN | WEIGHT: 230 LBS | SYSTOLIC BLOOD PRESSURE: 176 MMHG | OXYGEN SATURATION: 97 %

## 2025-01-06 DIAGNOSIS — Z87.19 PERSONAL HISTORY OF OTHER DISEASES OF THE DIGESTIVE SYSTEM: ICD-10-CM

## 2025-01-06 DIAGNOSIS — K91.858 OTHER COMPLICATIONS OF INTESTINAL POUCH: ICD-10-CM

## 2025-01-06 PROCEDURE — 99203 OFFICE O/P NEW LOW 30 MIN: CPT

## 2025-08-25 ENCOUNTER — OUTPATIENT (OUTPATIENT)
Dept: OUTPATIENT SERVICES | Facility: HOSPITAL | Age: 25
LOS: 1 days | End: 2025-08-25
Payer: COMMERCIAL

## 2025-08-25 ENCOUNTER — APPOINTMENT (OUTPATIENT)
Dept: COLORECTAL SURGERY | Facility: CLINIC | Age: 25
End: 2025-08-25

## 2025-08-25 VITALS
DIASTOLIC BLOOD PRESSURE: 89 MMHG | OXYGEN SATURATION: 100 % | SYSTOLIC BLOOD PRESSURE: 159 MMHG | HEIGHT: 71 IN | RESPIRATION RATE: 14 BRPM | TEMPERATURE: 99 F | WEIGHT: 246.04 LBS | HEART RATE: 94 BPM

## 2025-08-25 VITALS
WEIGHT: 248 LBS | SYSTOLIC BLOOD PRESSURE: 174 MMHG | TEMPERATURE: 98.2 F | HEART RATE: 143 BPM | OXYGEN SATURATION: 98 % | DIASTOLIC BLOOD PRESSURE: 113 MMHG | BODY MASS INDEX: 34.72 KG/M2 | HEIGHT: 71 IN

## 2025-08-25 DIAGNOSIS — Z98.890 OTHER SPECIFIED POSTPROCEDURAL STATES: Chronic | ICD-10-CM

## 2025-08-25 DIAGNOSIS — Z98.890 OTHER SPECIFIED POSTPROCEDURAL STATES: ICD-10-CM

## 2025-08-25 DIAGNOSIS — Z93.3 COLOSTOMY STATUS: Chronic | ICD-10-CM

## 2025-08-25 PROCEDURE — 85027 COMPLETE CBC AUTOMATED: CPT

## 2025-08-25 PROCEDURE — 36415 COLL VENOUS BLD VENIPUNCTURE: CPT

## 2025-08-25 PROCEDURE — 80048 BASIC METABOLIC PNL TOTAL CA: CPT

## 2025-08-25 PROCEDURE — G0463: CPT

## 2025-08-25 RX ORDER — LIDOCAINE HCL/PF 10 MG/ML
0.2 VIAL (ML) INJECTION ONCE
Refills: 0 | Status: DISCONTINUED | OUTPATIENT
Start: 2025-09-03 | End: 2025-09-03

## 2025-09-03 ENCOUNTER — TRANSCRIPTION ENCOUNTER (OUTPATIENT)
Age: 25
End: 2025-09-03

## 2025-09-03 ENCOUNTER — APPOINTMENT (OUTPATIENT)
Dept: COLORECTAL SURGERY | Facility: HOSPITAL | Age: 25
End: 2025-09-03

## 2025-09-03 ENCOUNTER — OUTPATIENT (OUTPATIENT)
Dept: INPATIENT UNIT | Facility: HOSPITAL | Age: 25
LOS: 1 days | End: 2025-09-03
Payer: COMMERCIAL

## 2025-09-03 ENCOUNTER — RESULT REVIEW (OUTPATIENT)
Age: 25
End: 2025-09-03

## 2025-09-03 VITALS
OXYGEN SATURATION: 98 % | HEART RATE: 134 BPM | DIASTOLIC BLOOD PRESSURE: 79 MMHG | WEIGHT: 246.04 LBS | HEIGHT: 70.87 IN | SYSTOLIC BLOOD PRESSURE: 157 MMHG | RESPIRATION RATE: 18 BRPM | TEMPERATURE: 98 F

## 2025-09-03 VITALS
HEART RATE: 95 BPM | OXYGEN SATURATION: 96 % | SYSTOLIC BLOOD PRESSURE: 153 MMHG | DIASTOLIC BLOOD PRESSURE: 90 MMHG | RESPIRATION RATE: 16 BRPM

## 2025-09-03 DIAGNOSIS — Z98.890 OTHER SPECIFIED POSTPROCEDURAL STATES: ICD-10-CM

## 2025-09-03 DIAGNOSIS — Z98.890 OTHER SPECIFIED POSTPROCEDURAL STATES: Chronic | ICD-10-CM

## 2025-09-03 PROCEDURE — 45990 SURG DX EXAM ANORECTAL: CPT

## 2025-09-03 PROCEDURE — 88305 TISSUE EXAM BY PATHOLOGIST: CPT | Mod: 26

## 2025-09-03 PROCEDURE — 44386 ENDOSCOPY BOWEL POUCH/BIOP: CPT

## 2025-09-03 PROCEDURE — 88305 TISSUE EXAM BY PATHOLOGIST: CPT

## 2025-09-03 RX ORDER — SALINE 7; 19 G/118ML; G/118ML
1 ENEMA RECTAL ONCE
Refills: 0 | Status: COMPLETED | OUTPATIENT
Start: 2025-09-03 | End: 2025-09-03

## 2025-09-03 RX ORDER — ONDANSETRON HCL/PF 4 MG/2 ML
4 VIAL (ML) INJECTION ONCE
Refills: 0 | Status: DISCONTINUED | OUTPATIENT
Start: 2025-09-03 | End: 2025-09-03

## 2025-09-03 RX ORDER — HYDROMORPHONE/SOD CHLOR,ISO/PF 2 MG/10 ML
0.5 SYRINGE (ML) INJECTION
Refills: 0 | Status: DISCONTINUED | OUTPATIENT
Start: 2025-09-03 | End: 2025-09-03

## 2025-09-03 RX ORDER — OXYCODONE HYDROCHLORIDE 30 MG/1
1 TABLET ORAL
Qty: 5 | Refills: 0
Start: 2025-09-03 | End: 2025-09-04

## 2025-09-03 RX ADMIN — SALINE 1 ENEMA: 7; 19 ENEMA RECTAL at 08:58

## 2025-09-05 LAB — SURGICAL PATHOLOGY STUDY: SIGNIFICANT CHANGE UP

## (undated) DEVICE — DRAPE TOWEL BLUE 17" X 24"

## (undated) DEVICE — WARMING BLANKET UPPER ADULT

## (undated) DEVICE — VENODYNE/SCD SLEEVE CALF MEDIUM

## (undated) DEVICE — VISITEC 4X4

## (undated) DEVICE — POSITIONER FOAM EGG CRATE ULNAR 2PCS (PINK)

## (undated) DEVICE — SOL IRR POUR H2O 250ML

## (undated) DEVICE — PACK LITHOTOMY

## (undated) DEVICE — TUBING CAP SET ERBEFLO CLEVERCAP HYBRID CO2 FOR OLYMPUS SCOPES AND UCR

## (undated) DEVICE — SUCTION YANKAUER OPEN TIP NO VENT CURVE

## (undated) DEVICE — Device

## (undated) DEVICE — PACK BASIC GOWN

## (undated) DEVICE — GOWN TRIMAX XXL

## (undated) DEVICE — BIOPSY FORCEP RADIAL JAW 4 STANDARD WITH NEEDLE

## (undated) DEVICE — DRAPE LEGGINGS XL

## (undated) DEVICE — SOL IRR POUR NS 0.9% 500ML